# Patient Record
Sex: FEMALE | Race: WHITE | NOT HISPANIC OR LATINO | Employment: OTHER | URBAN - METROPOLITAN AREA
[De-identification: names, ages, dates, MRNs, and addresses within clinical notes are randomized per-mention and may not be internally consistent; named-entity substitution may affect disease eponyms.]

---

## 2017-01-03 DIAGNOSIS — I10 ESSENTIAL HYPERTENSION: Chronic | ICD-10-CM

## 2017-01-04 RX ORDER — HYDROCHLOROTHIAZIDE 25 MG/1
TABLET ORAL
Qty: 90 TABLET | Refills: 1 | Status: ON HOLD | OUTPATIENT
Start: 2017-01-04 | End: 2017-04-24 | Stop reason: HOSPADM

## 2017-01-23 ENCOUNTER — OFFICE VISIT (OUTPATIENT)
Dept: FAMILY MEDICINE | Facility: CLINIC | Age: 77
End: 2017-01-23
Payer: MEDICARE

## 2017-01-23 VITALS
SYSTOLIC BLOOD PRESSURE: 120 MMHG | OXYGEN SATURATION: 96 % | TEMPERATURE: 98 F | BODY MASS INDEX: 21.71 KG/M2 | DIASTOLIC BLOOD PRESSURE: 66 MMHG | HEIGHT: 63 IN | HEART RATE: 81 BPM | WEIGHT: 122.56 LBS

## 2017-01-23 DIAGNOSIS — I25.119 CORONARY ARTERY DISEASE INVOLVING NATIVE CORONARY ARTERY OF NATIVE HEART WITH ANGINA PECTORIS: Chronic | ICD-10-CM

## 2017-01-23 DIAGNOSIS — I73.9 PERIPHERAL ARTERIAL DISEASE: Chronic | ICD-10-CM

## 2017-01-23 DIAGNOSIS — E11.42 CONTROLLED TYPE 2 DIABETES MELLITUS WITH DIABETIC POLYNEUROPATHY, WITHOUT LONG-TERM CURRENT USE OF INSULIN: ICD-10-CM

## 2017-01-23 DIAGNOSIS — E05.90 HYPERTHYROIDISM: Chronic | ICD-10-CM

## 2017-01-23 DIAGNOSIS — M54.50 ACUTE BILATERAL LOW BACK PAIN WITHOUT SCIATICA: ICD-10-CM

## 2017-01-23 DIAGNOSIS — R60.0 BILATERAL LEG EDEMA: Primary | ICD-10-CM

## 2017-01-23 DIAGNOSIS — I10 ESSENTIAL HYPERTENSION: Chronic | ICD-10-CM

## 2017-01-23 DIAGNOSIS — I70.0 ATHEROSCLEROSIS OF AORTA: Chronic | ICD-10-CM

## 2017-01-23 PROCEDURE — 99999 PR PBB SHADOW E&M-EST. PATIENT-LVL III: CPT | Mod: PBBFAC,,, | Performed by: FAMILY MEDICINE

## 2017-01-23 PROCEDURE — 1160F RVW MEDS BY RX/DR IN RCRD: CPT | Mod: S$GLB,,, | Performed by: FAMILY MEDICINE

## 2017-01-23 PROCEDURE — 1159F MED LIST DOCD IN RCRD: CPT | Mod: S$GLB,,, | Performed by: FAMILY MEDICINE

## 2017-01-23 PROCEDURE — 1157F ADVNC CARE PLAN IN RCRD: CPT | Mod: S$GLB,,, | Performed by: FAMILY MEDICINE

## 2017-01-23 PROCEDURE — 99499 UNLISTED E&M SERVICE: CPT | Mod: S$GLB,,, | Performed by: FAMILY MEDICINE

## 2017-01-23 PROCEDURE — 3074F SYST BP LT 130 MM HG: CPT | Mod: S$GLB,,, | Performed by: FAMILY MEDICINE

## 2017-01-23 PROCEDURE — 99214 OFFICE O/P EST MOD 30 MIN: CPT | Mod: 25,S$GLB,, | Performed by: FAMILY MEDICINE

## 2017-01-23 PROCEDURE — 3078F DIAST BP <80 MM HG: CPT | Mod: S$GLB,,, | Performed by: FAMILY MEDICINE

## 2017-01-23 RX ORDER — GABAPENTIN 300 MG/1
300 CAPSULE ORAL NIGHTLY
Qty: 30 CAPSULE | Refills: 11 | Status: SHIPPED | OUTPATIENT
Start: 2017-01-23 | End: 2018-03-29 | Stop reason: SDUPTHER

## 2017-01-23 RX ORDER — TRAMADOL HYDROCHLORIDE 50 MG/1
50 TABLET ORAL EVERY 6 HOURS PRN
Qty: 30 TABLET | Refills: 0 | Status: SHIPPED | OUTPATIENT
Start: 2017-01-23 | End: 2017-04-20

## 2017-01-23 RX ORDER — GLIPIZIDE 10 MG/1
TABLET ORAL
Qty: 270 TABLET | Refills: 1 | Status: SHIPPED | OUTPATIENT
Start: 2017-01-23 | End: 2017-05-10

## 2017-01-23 NOTE — MR AVS SNAPSHOT
[facility]  [address] [facility]  [place] [address]  Phone: [phone]  Fax: [phone]                   Drew Kendrick   2017 2:30 PM   Office Visit    Description:  Female : 1940   Provider:  Aimee Marquis MD   Department:  [facility]           Reason for Visit     Edema           Diagnoses this Visit        Comments    Acute bilateral low back pain without sciatica                To Do List           Goals (5 Years of Data)              8/24/16    4/6/16    8/20/15    COMPLETED: HDL > 40   57    42    Related Problems    Combined hyperlipidemia associated with type 2 diabetes mellitus    HEMOGLOBIN A1C < 7.5   6.2  5.9  5.9    Related Problems    Diabetes mellitus type 2, controlled    COMPLETED: LDL CHOLESTEROL < 100   92.4    79.8    Related Problems    Combined hyperlipidemia associated with type 2 diabetes mellitus    Quit smoking / using tobacco     Not on track      Related Problems    Tobacco use    Notes - Note created  12/10/2013 11:44 AM by FELICITAS Thomas MD    Try switching to the electronic cigarette as a way to quit smoking.         These Medications        Disp Refills Start End    glipiZIDE (GLUCOTROL) 10 MG tablet 270 tablet 1 2017     TAKE TWO PILLS BY MOUTH EVERY MORNING AND ONE PILL BY MOUTH EVERY EVENING.    Pharmacy: [facility]/pharmacy #[address] [place], [place] [address] [place] Ph #: [phone]       tramadol (ULTRAM) 50 mg tablet 30 tablet 0 2017    Take 1 tablet (50 mg total) by mouth every 6 (six) hours as needed. - Oral    Pharmacy: [facility]/pharmacy #[facility] - [address] [facility] [place] [employer] Ph #: [phone]       gabapentin (NEURONTIN) 300 MG capsule 30 capsule 11 2017    Take 1 capsule (300 mg total) by mouth every evening. - Oral    Pharmacy: [facility]/pharmacy #[facility] - [facility]ly [facility] [place] #: [phone]         [facility]     [facility] Care [facility]  - 24/7 Assistance  Registered nurses in the [facility] provide clinical advisement, health education, appointment booking, and other advisory services.  Call for this free service at [phone].             Medications           Message regarding Medications     Verify the changes and/or additions to your medication regime listed below are the same as discussed with your clinician today.  If any of these changes or additions are incorrect, please notify your healthcare provider.        START taking these NEW medications        Refills    gabapentin (NEURONTIN) 300 MG capsule 11    Sig: Take 1 capsule (300 mg total) by mouth every evening.    Class: Normal    Route: Oral      STOP taking these medications     NICODERM CQ 14 mg/24 hr PLACE 1 PATCH ONTO THE SKIN ONCE DAILY.    nicotine polacrilex (NICORETTE) 4 MG Gum Take 1 each (4 mg total) by mouth as needed. Please dispense fruit chill flavor. Maximum 24 pieces/day.           Verify that the below list of medications is an accurate representation of the medications you are currently taking.  If none reported, the list may be blank. If incorrect, please contact your healthcare provider. Carry this list with you in case of emergency.           Current Medications     ACCU-CHEK SMARTVIEW TEST STRIP Strp     ALCOHOL PREP PADS PadM     aspirin 81 MG Chew Take 81 mg by mouth once daily.    cetirizine (ZYRTEC) 10 MG tablet TAKE 1 TABLET (10 MG TOTAL) BY MOUTH DAILY AS NEEDED FOR ALLERGIES OR RHINITIS.    cilostazol (PLETAL) 50 MG Tab Take 1 tablet (50 mg total) by mouth once daily.    diclofenac (VOLTAREN) 50 MG EC tablet Take 1 tablet (50 mg total) by mouth 3 (three) times daily as needed.    glipiZIDE (GLUCOTROL) 10 MG tablet TAKE TWO PILLS BY MOUTH EVERY MORNING AND ONE PILL BY MOUTH EVERY EVENING.    ibuprofen (ADVIL,MOTRIN) 200 MG tablet Take 2 tablets (400 mg total) by mouth every 6 (six) hours as needed for Pain.    lancets 30 gauge Misc     lancing  "device Misc     metformin (GLUCOPHAGE) 500 MG tablet Take 1 tablet (500 mg total) by mouth 2 (two) times daily with meals.    metoprolol tartrate (LOPRESSOR) 50 MG tablet Take 1 tablet (50 mg total) by mouth once daily.    nitroGLYCERIN (NITROSTAT) 0.4 MG SL tablet Place 1 tablet (0.4 mg total) under the tongue every 5 (five) minutes as needed for Chest pain. 1 Tablet, Sublingual Sublingual as needed .  as directed    ramipril (ALTACE) 5 MG capsule Take 1 capsule (5 mg total) by mouth once daily.    tramadol (ULTRAM) 50 mg tablet Take 1 tablet (50 mg total) by mouth every 6 (six) hours as needed.    fluticasone (FLONASE) 50 mcg/actuation nasal spray 1 spray by Each Nare route once daily.    gabapentin (NEURONTIN) 300 MG capsule Take 1 capsule (300 mg total) by mouth every evening.    hydrochlorothiazide (HYDRODIURIL) 25 MG tablet TAKE 1 TABLET (25 MG TOTAL) BY MOUTH ONCE DAILY.    hydrOXYzine (ATARAX) 25 MG tablet TAKE ONE OR TWO TABLETS EVERY 4-6 HOURS AS NEEDED FOR ITCHING. THIS MAY MAKE YOU MORE PRONE TO FALLING.    propylthiouracil (PTU) 50 mg Tab Take 2 tablets (100 mg total) by mouth every 8 (eight) hours.           Clinical Reference Information           Vital Signs - Last Recorded  Most recent update: 1/23/2017  2:33 PM by Rachael Moreno MA    BP Pulse Temp Ht Wt SpO2    120/66 (BP Location: Right arm, Patient Position: Sitting, BP Method: Manual) 81 97.7 °F (36.5 °C) (Oral) 5' 3" (1.6 m) 55.6 kg (122 lb 9.2 oz) 96%    BMI                21.71 kg/m2          Blood Pressure          Most Recent Value    BP  120/66      Allergies as of 1/23/2017     Pravastatin      Immunizations Administered on Date of Encounter - 1/23/2017     None      Smoking Cessation     If you would like to quit smoking:   You may be eligible for free services if you are a [place] resident and started smoking cigarettes before September 1, 1988.  Call the [employer]oking [employer]ess[employer] ([employer]) toll free at [phone] [phone] or (864) " 424-0938.   Call 3-386-QUIT-NOW if you do not meet the above criteria.

## 2017-01-24 NOTE — PROGRESS NOTES
Routine Office Visit    Patient Name: Khloe Kendrick    : 1940  MRN: [id number]    Subjective:  Khloe is a 76 y.o. female who presents today for     1. Establish care / new to me   2. Bilateral leg swelling, right greater than left. Pt states symptoms started 3 days ago. Swelling is worse after patient walks / stands for long periods of time. Pt states leg swelling is associated with pain. Pain is described as a burning pain in the bottom of her feet. She was taking tramadol for the pain.     Review of Systems   Constitutional: Negative for chills and fever.   HENT: Negative for congestion.    Eyes: Negative for blurred vision.   Respiratory: Negative for cough.    Cardiovascular: Positive for leg swelling. Negative for chest pain.   Gastrointestinal: Negative for abdominal pain, constipation, diarrhea, heartburn, nausea and vomiting.   Genitourinary: Negative for dysuria.   Musculoskeletal: Negative for myalgias.   Skin: Negative for itching and rash.   Neurological: Negative for dizziness and headaches.   Psychiatric/Behavioral: Negative for depression.       Active Problem List  Patient Active Problem List   Diagnosis    Diabetes mellitus type 2, controlled    Essential hypertension    Combined hyperlipidemia associated with type 2 diabetes mellitus    Osteopenia    Osteoarthritis    Tobacco use    Asymptomatic carotid artery stenosis without infarction    Peripheral arterial disease    Atherosclerosis of aorta    Angina pectoris    Coronary artery disease    Pseudophakia    Senile nuclear sclerosis    Hyperthyroidism    Weight loss    Allergic rhinitis    AMD (age-related macular degeneration), bilateral    Bilateral posterior capsular opacification    Vitreomacular traction syndrome of left eye       Past Surgical History  Past Surgical History   Procedure Laterality Date    Abdominal surgery      Hysterectomy      Hernia repair      Appendectomy      Eye surgery       Rt eye  "cataract    Cardiac surgery  2008     CABG    Hysterotomy N/A 33 yrs old    Cataract extraction Right     Cataract extraction w/  intraocular lens implant  8/13/2015     Dr. Blount ( [facility])       Family History  Family History   Problem Relation Age of Onset    Diabetes Mother     Coronary artery disease Father     Cataracts Father     Heart disease Brother     Cataracts Sister     No Known Problems Maternal Aunt     No Known Problems Maternal Uncle     No Known Problems Paternal Aunt     No Known Problems Paternal Uncle     No Known Problems Maternal Grandmother     No Known Problems Maternal Grandfather     No Known Problems Paternal Grandmother     No Known Problems Paternal Grandfather     Amblyopia Neg Hx     Blindness Neg Hx     Cancer Neg Hx     Glaucoma Neg Hx     Hypertension Neg Hx     Macular degeneration Neg Hx     Retinal detachment Neg Hx     Strabismus Neg Hx     Stroke Neg Hx     Thyroid disease Neg Hx        Social History  Social History     Social History    Marital status:      Spouse name: N/A    Number of children: N/A    Years of education: N/A     Occupational History    Not on file.     Social History Main Topics    Smoking status: Current Every Day Smoker     Packs/day: 1.00    Smokeless tobacco: Not on file    Alcohol use No    Drug use: No    Sexual activity: Not on file     Other Topics Concern    Not on file     Social History Narrative       Medications and Allergies  Reviewed and updated.       Physical Exam  Visit Vitals    /66 (BP Location: Right arm, Patient Position: Sitting, BP Method: Manual)    Pulse 81    Temp 97.7 °F (36.5 °C) (Oral)    Ht 5' 3" (1.6 m)    Wt 55.6 kg (122 lb 9.2 oz)    SpO2 96%    BMI 21.71 kg/m2     Physical Exam   Constitutional: She is oriented to person, place, and time. She appears well-developed and well-nourished.   HENT:   Head: Normocephalic and atraumatic.   Eyes: Conjunctivae and EOM are " normal. Pupils are equal, round, and reactive to light.   Neck: Normal range of motion. Neck supple.   Cardiovascular: Normal rate, regular rhythm and normal heart sounds.  Exam reveals no gallop and no friction rub.    No murmur heard.  Pulmonary/Chest: Breath sounds normal. No respiratory distress.   Abdominal: Soft. Bowel sounds are normal. She exhibits no distension. There is no tenderness.   Musculoskeletal: Normal range of motion.   Lymphadenopathy:     She has no cervical adenopathy.   Neurological: She is alert and oriented to person, place, and time.   Skin: Skin is warm.   Psychiatric: She has a normal mood and affect.         Assessment/Plan:  Khloe Kendrick is a 76 y.o. female who presents today for :    Bilateral leg edema  -     US Ankle Brachial Indices Ext LTD WO Str; Future; Expected date: 1/23/17  -     US Lower Extremity Veins Bilateral; Future; Expected date: 1/23/17    Acute bilateral low back pain without sciatica  -     tramadol (ULTRAM) 50 mg tablet; Take 1 tablet (50 mg total) by mouth every 6 (six) hours as needed.  Dispense: 30 tablet; Refill: 0  Will refill   Common side effects of this medication were discussed with the patient. Questions regarding medications were discussed during this visit.   Advise only take for severe pain     Controlled type 2 diabetes mellitus with diabetic polyneuropathy, without long-term current use of insulin  -     glipiZIDE (GLUCOTROL) 10 MG tablet; TAKE TWO PILLS BY MOUTH EVERY MORNING AND ONE PILL BY MOUTH EVERY EVENING.  Dispense: 270 tablet; Refill: 1  -     gabapentin (NEURONTIN) 300 MG capsule; Take 1 capsule (300 mg total) by mouth every evening.  Dispense: 30 capsule; Refill: 11  Neuropathy - will start on gabapentin for pain  Common side effects of this medication were discussed with the patient. Questions regarding medications were discussed during this visit.     Hyperthyroidism  The current medical regimen is effective;  continue present  plan and medications.    Atherosclerosis of aorta / Coronary artery disease involving native coronary artery of native heart with angina pectoris  Patient with Atherosclerosis of the Aorta.  Stable/asymptomatic. Currently stable on lipid lowering treatment and b/p monitoring.  Recommend to f/u with cardiology     Peripheral arterial disease  The current medical regimen is effective;  continue present plan and medications.    Essential hypertension  The current medical regimen is effective;  continue present plan and medications.            Return in about 3 months (around 4/23/2017), or if symptoms worsen or fail to improve.

## 2017-01-27 ENCOUNTER — HOSPITAL ENCOUNTER (OUTPATIENT)
Dept: RADIOLOGY | Facility: HOSPITAL | Age: 77
Discharge: HOME | End: 2017-01-27
Attending: FAMILY MEDICINE
Payer: MEDICARE

## 2017-01-27 ENCOUNTER — TELEPHONE (OUTPATIENT)
Dept: FAMILY MEDICINE | Facility: CLINIC | Age: 77
End: 2017-01-27

## 2017-01-27 DIAGNOSIS — R60.0 BILATERAL LEG EDEMA: ICD-10-CM

## 2017-01-27 PROCEDURE — 93970 EXTREMITY STUDY: CPT | Mod: 26,,, | Performed by: RADIOLOGY

## 2017-01-27 PROCEDURE — 93922 UPR/L XTREMITY ART 2 LEVELS: CPT | Mod: 26,,, | Performed by: RADIOLOGY

## 2017-01-27 PROCEDURE — 93970 EXTREMITY STUDY: CPT | Mod: TC

## 2017-01-27 PROCEDURE — 93922 UPR/L XTREMITY ART 2 LEVELS: CPT | Mod: TC

## 2017-01-29 DIAGNOSIS — E05.00 GRAVES' DISEASE: Primary | ICD-10-CM

## 2017-02-03 RX ORDER — PROPYLTHIOURACIL 50 MG/1
TABLET ORAL
Qty: 180 TABLET | Refills: 4 | Status: ON HOLD | OUTPATIENT
Start: 2017-02-03 | End: 2017-04-24 | Stop reason: HOSPADM

## 2017-02-03 NOTE — TELEPHONE ENCOUNTER
This patient has tried to get in touch with me regarding scheduling a dose of GALVIN for treatment of Graves'.     It looks like i ordered the therapy back in 8/2016. Not sure why it was not scheduled.     PLAN:    She needs lab tests to evaluate thyroid activity.   If the levels are close to normal then we can schedule the treatment dose of GALVIN for her overactive thyroid gland  she needs to stop the PTU for five days prior.      Lab Results   Component Value Date    TSH <0.010 (L) 11/01/2016

## 2017-03-17 DIAGNOSIS — E11.9 TYPE 2 DIABETES MELLITUS WITHOUT COMPLICATION: ICD-10-CM

## 2017-03-23 ENCOUNTER — OFFICE VISIT (OUTPATIENT)
Dept: FAMILY MEDICINE | Facility: CLINIC | Age: 77
End: 2017-03-23
Payer: MEDICARE

## 2017-03-23 VITALS
OXYGEN SATURATION: 99 % | HEIGHT: 63 IN | WEIGHT: 120.25 LBS | SYSTOLIC BLOOD PRESSURE: 180 MMHG | HEART RATE: 88 BPM | BODY MASS INDEX: 21.3 KG/M2 | DIASTOLIC BLOOD PRESSURE: 70 MMHG | TEMPERATURE: 98 F

## 2017-03-23 PROCEDURE — 1157F ADVNC CARE PLAN IN RCRD: CPT | Mod: S$GLB,,, | Performed by: NURSE PRACTITIONER

## 2017-03-23 PROCEDURE — 1125F AMNT PAIN NOTED PAIN PRSNT: CPT | Mod: S$GLB,,, | Performed by: NURSE PRACTITIONER

## 2017-03-23 PROCEDURE — 3078F DIAST BP <80 MM HG: CPT | Mod: S$GLB,,, | Performed by: NURSE PRACTITIONER

## 2017-03-23 PROCEDURE — 1160F RVW MEDS BY RX/DR IN RCRD: CPT | Mod: S$GLB,,, | Performed by: NURSE PRACTITIONER

## 2017-03-23 PROCEDURE — 99214 OFFICE O/P EST MOD 30 MIN: CPT | Mod: S$GLB,,, | Performed by: NURSE PRACTITIONER

## 2017-03-23 PROCEDURE — 1159F MED LIST DOCD IN RCRD: CPT | Mod: S$GLB,,, | Performed by: NURSE PRACTITIONER

## 2017-03-23 PROCEDURE — 99999 PR PBB SHADOW E&M-EST. PATIENT-LVL IV: CPT | Mod: PBBFAC,,, | Performed by: NURSE PRACTITIONER

## 2017-03-23 PROCEDURE — 3077F SYST BP >= 140 MM HG: CPT | Mod: S$GLB,,, | Performed by: NURSE PRACTITIONER

## 2017-03-23 RX ORDER — FLUTICASONE PROPIONATE 50 MCG
SPRAY, SUSPENSION (ML) NASAL
Refills: 5 | COMMUNITY
Start: 2016-12-31 | End: 2018-06-28

## 2017-03-23 RX ORDER — TRAMADOL HYDROCHLORIDE 50 MG/1
50 TABLET ORAL EVERY 6 HOURS PRN
Qty: 40 TABLET | Refills: 0 | Status: SHIPPED | OUTPATIENT
Start: 2017-03-23 | End: 2017-04-02

## 2017-03-23 RX ORDER — LIDOCAINE AND PRILOCAINE 25; 25 MG/G; MG/G
CREAM TOPICAL
Qty: 30 G | Refills: 0 | Status: SHIPPED | OUTPATIENT
Start: 2017-03-23 | End: 2017-05-05 | Stop reason: SDUPTHER

## 2017-03-23 RX ORDER — METHYLPREDNISOLONE 4 MG/1
TABLET ORAL
Qty: 1 PACKAGE | Refills: 0 | Status: ON HOLD | OUTPATIENT
Start: 2017-03-23 | End: 2017-04-24 | Stop reason: HOSPADM

## 2017-03-23 RX ORDER — AZITHROMYCIN 250 MG/1
TABLET, FILM COATED ORAL
Refills: 0 | Status: ON HOLD | COMMUNITY
Start: 2016-12-31 | End: 2017-04-24 | Stop reason: HOSPADM

## 2017-03-23 NOTE — PROGRESS NOTES
Subjective:       Patient ID: Khloe Kendrick is a 76 y.o. female.    Chief Complaint: Shoulder Pain    HPI Comments: 75 y/o female in office with compliant of neck and left shoulder pain for 2 weeks.Denies any recent injuries. Pain does not radiate.The pain in worst in morning. describes the pain as an stiff aching pain.  Denies any sweating, N/V, or chest pain. Has treated pain with Ibuprofen and heat with minimal relief.       Past Medical History:   Diagnosis Date    AMD (age-related macular degeneration), bilateral 6/10/2016    Cataract     Coronary artery disease     Diabetes mellitus, type 2     Hyperlipidemia     Hypertension     Hyperthyroidism 8/20/2015    Osteoarthritis     Peripheral arterial disease 4/3/2013       Social History     Social History    Marital status:      Spouse name: N/A    Number of children: N/A    Years of education: N/A     Occupational History    Not on file.     Social History Main Topics    Smoking status: Current Every Day Smoker     Packs/day: 1.00    Smokeless tobacco: Not on file    Alcohol use No    Drug use: No    Sexual activity: Not on file     Other Topics Concern    Not on file     Social History Narrative       Past Surgical History:   Procedure Laterality Date    ABDOMINAL SURGERY      APPENDECTOMY      CARDIAC SURGERY  2008    CABG    CATARACT EXTRACTION Right     CATARACT EXTRACTION W/  INTRAOCULAR LENS IMPLANT  8/13/2015    Dr. Blount ( [facility])    EYE SURGERY  2011    Rt eye cataract    HERNIA REPAIR      HYSTERECTOMY      HYSTEROTOMY N/A 33 yrs old       Review of Systems   Respiratory: Negative for chest tightness, shortness of breath and wheezing.    Cardiovascular: Negative for chest pain and palpitations.   Musculoskeletal: Positive for back pain, neck pain and neck stiffness. Negative for joint swelling.   Skin: Negative for color change, pallor and rash.   Neurological: Positive for headaches. Negative for dizziness,  "syncope, weakness, light-headedness and numbness.       Objective:   BP (!) 180/70  Pulse 88  Temp 98.1 °F (36.7 °C) (Oral)   Ht 5' 3" (1.6 m)  Wt 54.5 kg (120 lb 4.2 oz)  SpO2 99%  BMI 21.3 kg/m2     Physical Exam   Constitutional: She is oriented to person, place, and time. She appears well-developed and well-nourished.   HENT:   Head: Normocephalic and atraumatic.   Neck: Muscular tenderness present. Decreased range of motion present.   Cardiovascular: Normal rate, regular rhythm, S1 normal, S2 normal and normal heart sounds.    Pulmonary/Chest: Breath sounds normal. She has no decreased breath sounds.   Musculoskeletal:        Left shoulder: She exhibits decreased range of motion, tenderness, bony tenderness, swelling, pain and decreased strength. She exhibits no effusion, no laceration and normal pulse.        Cervical back: She exhibits decreased range of motion, tenderness, bony tenderness and pain. She exhibits no swelling, no edema and no deformity.   Neurological: She is alert and oriented to person, place, and time.   Skin: Skin is warm, dry and intact. She is not diaphoretic. No pallor.   Psychiatric: She has a normal mood and affect. Her speech is normal and behavior is normal. Thought content normal.       Assessment:       1. Bursitis/tendonitis, shoulder        Plan:       Khloe was seen today for shoulder pain.    Diagnoses and all orders for this visit:    Bursitis/tendonitis, shoulder  -     tramadol (ULTRAM) 50 mg tablet; Take 1 tablet (50 mg total) by mouth every 6 (six) hours as needed for Pain.  -     methylPREDNISolone (MEDROL DOSEPACK) 4 mg tablet; use as directed  -     lidocaine-prilocaine (EMLA) cream; Apply topically as needed. To left shoulder  Home care  · Rest the painful joint and protect it from movement. This will allow the inflammation to heal faster.  · Apply an ice pack over the injured area for no more than 15 to 20 minutes. Do this every 3 to 6 hours for the first 24 to " 48 hours. Keep using ice packs 3 to 4 times a day until the pain and swelling improves.   · To make an ice pack, put ice cubes in a sealed plastic zip-lock bag. Wrap the bag in a clean, thin towel or cloth. Never put ice or an ice pack directly on the skin. As the ice melts, be careful to avoid getting any wrap or splint wet.  · You may take over-the-counter pain medicine to treat pain and inflammation, unless another medicine was prescribed. Anti-inflammatory pain medicines may be more effective. Talk with your provider beforeusing these medicines if you have chronic liver or kidney disease, or ever had a stomach ulcer or GI (gastrointestinal) bleeding.  As your symptoms improve, slowly begin to move the joint. Do not overuse the joint. This may cause the symptoms to flare up again.    Return if symptoms worsen or fail to improve.

## 2017-03-23 NOTE — MR AVS SNAPSHOT
[facility]  [address] [facility]  [place] [address]  Phone: [phone]  Fax: [phone]                   Drew Kendrick   3/23/2017 2:00 PM   Office Visit    Description:  Female : 1940   Provider:  CHELO Alonso   Department:  [facility]           Diagnoses this Visit        Comments    Acute bilateral low back pain without sciatica                To Do List           Goals (5 Years of Data)              8/24/16    4/6/16    8/20/15    COMPLETED: HDL > 40   57    42    Related Problems    Combined hyperlipidemia associated with type 2 diabetes mellitus    HEMOGLOBIN A1C < 7.5   6.2  5.9  5.9    Related Problems    Diabetes mellitus type 2, controlled    COMPLETED: LDL CHOLESTEROL < 100   92.4    79.8    Related Problems    Combined hyperlipidemia associated with type 2 diabetes mellitus    Quit smoking / using tobacco     Not on track      Related Problems    Tobacco use    Notes - Note created  12/10/2013 11:44 AM by FELICITAS Thomas MD    Try switching to the electronic cigarette as a way to quit smoking.        Follow-Up and Disposition     Return if symptoms worsen or fail to improve.       These Medications        Disp Refills Start End    tramadol (ULTRAM) 50 mg tablet 40 tablet 0 3/23/2017 2017    Take 1 tablet (50 mg total) by mouth every 6 (six) hours as needed for Pain. - Oral    Pharmacy: [facility]/pharmacy #[facility] [employer] Ph #: [phone]       methylPREDNISolone (MEDROL DOSEPACK) 4 mg tablet 1 Package 0 3/23/2017     use as directed    Pharmacy: [facility]/pharmacy #[facility] [employer] Ph #: [phone]       lidocaine-prilocaine (EMLA) cream 30 g 0 3/23/2017     Apply topically as needed. To left shoulder - Topical (Top)    [facility] [place] [employer] Ph #: [phone]         [facility]     [facility] -   Assistance  Registered nurses in the [facility] provide clinical advisement, health education, appointment booking, and other advisory services.  Call for this free service at [phone].             Medications           Message regarding Medications     Verify the changes and/or additions to your medication regime listed below are the same as discussed with your clinician today.  If any of these changes or additions are incorrect, please notify your healthcare provider.        START taking these NEW medications        Refills    tramadol (ULTRAM) 50 mg tablet 0    Sig: Take 1 tablet (50 mg total) by mouth every 6 (six) hours as needed for Pain.    Class: Normal    Route: Oral    methylPREDNISolone (MEDROL DOSEPACK) 4 mg tablet 0    Sig: use as directed    Class: Normal    lidocaine-prilocaine (EMLA) cream 0    Sig: Apply topically as needed. To left shoulder    Class: Normal    Route: Topical (Top)           Verify that the below list of medications is an accurate representation of the medications you are currently taking.  If none reported, the list may be blank. If incorrect, please contact your healthcare provider. Carry this list with you in case of emergency.           Current Medications     ACCU-CHEK SMARTVIEW TEST STRIP Strp     ALCOHOL PREP PADS PadM     aspirin 81 MG Chew Take 81 mg by mouth once daily.    cetirizine (ZYRTEC) 10 MG tablet TAKE 1 TABLET (10 MG TOTAL) BY MOUTH DAILY AS NEEDED FOR ALLERGIES OR RHINITIS.    diclofenac (VOLTAREN) 50 MG EC tablet Take 1 tablet (50 mg total) by mouth 3 (three) times daily as needed.    gabapentin (NEURONTIN) 300 MG capsule Take 1 capsule (300 mg total) by mouth every evening.    glipiZIDE (GLUCOTROL) 10 MG tablet TAKE TWO PILLS BY MOUTH EVERY MORNING AND ONE PILL BY MOUTH EVERY EVENING.    hydrochlorothiazide (HYDRODIURIL) 25 MG tablet TAKE 1 TABLET (25 MG TOTAL) BY MOUTH ONCE DAILY.    hydrOXYzine (ATARAX) 25 MG tablet TAKE ONE OR TWO TABLETS  "EVERY 4-6 HOURS AS NEEDED FOR ITCHING. THIS MAY MAKE YOU MORE PRONE TO FALLING.    ibuprofen (ADVIL,MOTRIN) 200 MG tablet Take 2 tablets (400 mg total) by mouth every 6 (six) hours as needed for Pain.    lancets 30 gauge Misc     lancing device Misc     metoprolol tartrate (LOPRESSOR) 50 MG tablet Take 1 tablet (50 mg total) by mouth once daily.    nitroGLYCERIN (NITROSTAT) 0.4 MG SL tablet Place 1 tablet (0.4 mg total) under the tongue every 5 (five) minutes as needed for Chest pain. 1 Tablet, Sublingual Sublingual as needed .  as directed    propylthiouracil (PTU) 50 mg Tab TAKE 2 TABLETS BY MOUTH EVERY 8 HOURS    tramadol (ULTRAM) 50 mg tablet Take 1 tablet (50 mg total) by mouth every 6 (six) hours as needed.    azithromycin (Z-MARYCHUY) 250 MG tablet TAKE 2 TABLETS BY MOUTH TODAY, THEN TAKE 1 TABLET DAILY FOR 4 DAYS    fluticasone (FLONASE) 50 mcg/actuation nasal spray USE 1 SPRAY BY EACH NARE ROUTE ONCE DAILY.    lidocaine-prilocaine (EMLA) cream Apply topically as needed. To left shoulder    metformin (GLUCOPHAGE) 500 MG tablet Take 1 tablet (500 mg total) by mouth 2 (two) times daily with meals.    methylPREDNISolone (MEDROL DOSEPACK) 4 mg tablet use as directed    tramadol (ULTRAM) 50 mg tablet Take 1 tablet (50 mg total) by mouth every 6 (six) hours as needed for Pain.           Clinical Reference Information           Your Vitals Were     BP Pulse Temp Height Weight SpO2    180/70 88 98.1 °F (36.7 °C) (Oral) 5' 3" (1.6 m) 54.5 kg (120 lb 4.2 oz) 99%    BMI                21.3 kg/m2          Blood Pressure          Most Recent Value    BP  (!)  180/70      Allergies as of 3/23/2017     Pravastatin      Immunizations Administered on Date of Encounter - 3/23/2017     None      Instructions      Tendonitis  A tendon is the thick fibrous cord that joins muscle to bone and allows joints to move. When a tendon becomes inflamed, it is called tendonitis. This can occur from overuse, injury, or infection. This usually " involves the shoulders, forearm, wrist, hands and foot. Symptoms include pain, swelling and tenderness to the touch. Moving the joint increases the pain.  It takes 4 to 6 weeks for tendonitis to heal. It is treated by preventing motion of the tendon with a splint or brace and the use of anti-inflammatory medicine.  Home care  · Some people find relief with ice packs. These can be crushed or cubed ice in a plastic bag or a bag of frozen vegetables wrapped in a thin towel. Other people get better relief with heat. This can include a hot shower, hot bath, or a moist towel warmed in a microwave. Try each and use the method that feels best, for 15 to 20 minutes several times a day.  · Rest the inflamed joint and protect it from movement.  · You may use over-the-counter ibuprofen or naproxen to treat pain and inflammation, unless another medicine was prescribed. If you can't take these medicines, acetaminophen may help with the pain, but does not treat inflammation. If you have chronic liver or kidney disease or ever had a stomach ulcer or gastrointestinal bleeding, talk with your doctor before using these medicines.  · As your symptoms improve, begin gradual motion at the involved joint.  Follow-up care  Follow up with your healthcare provider if you are not improving after 5 days of treatment.  When to seek medical advice  Call your healthcare provider right away if any of these occur:  · Redness over the painful area  · Increasing pain or swelling at the joint  · Fever (1 degree above your normal temperature) lasting 24 to 48 hours Or, whatever your healthcare provider told you to report based on your condition  Date Last Reviewed: 11/21/2015  © 5172-5699 [employer]. [address] [place] [address], [place] [address]. All rights reserved. This information is not intended as a substitute for professional medical care. Always follow your healthcare professional's instructions.        Bursitis  You have bursitis.  This is an inflammation of the bursa. These are small, fluid-filled sacs that surround the larger joints of the body. The bursa help the muscles and tendons move smoothly over the joints.  Bursitis often happens in the shoulder. But it can also affect the elbows, hips, pelvis, knees, toes, and heels. Bursitis can be caused by injury, overuse of the joint, or infection of the bursa. Symptoms include pain and tenderness over a joint. Symptoms get worse with movement.  Bursitis is treated with an anti-inflammatory medicine and by resting the joint. More severe cases require injection of medicine directly into the bursa.    Home care  · Rest the painful joint and protect it from movement. This will allow the inflammation to heal faster.  · Apply an ice pack over the injured area for no more than 15 to 20 minutes. Do this every 3 to 6 hours for the first 24 to 48 hours. Keep using ice packs 3 to 4 times a day until the pain and swelling improves.   · To make an ice pack, put ice cubes in a sealed plastic zip-lock bag. Wrap the bag in a clean, thin towel or cloth. Never put ice or an ice pack directly on the skin. As the ice melts, be careful to avoid getting any wrap or splint wet.  · You may take over-the-counter pain medicine to treat pain and inflammation, unless another medicine was prescribed. Anti-inflammatory pain medicines may be more effective. Talk with your provider beforeusing these medicines if you have chronic liver or kidney disease, or ever had a stomach ulcer or GI (gastrointestinal) bleeding.  · As your symptoms improve, slowly begin to move the joint. Do not overuse the joint. This may cause the symptoms to flare up again.  When to seek medical advice  Call your healthcare provider right away if any of these occur:  · Redness over the painful area  · Increasing pain or swelling at the joint  · Fever of 100.4°F (38°C) or above lasting for 24 to 48 hours  Date Last Reviewed: 11/21/2015  © 7710-6161 [employer]. [address] [place] [address], [place] [address]. All rights reserved. This information is not intended as a substitute for professional medical care. Always follow your healthcare professional's instructions.             Smoking Cessation     If you would like to quit smoking:   You may be eligible for free services if you are a [place] resident and started smoking cigarettes before September 1, 1988.  Call the [employer]oking [employer]essation [employer] (SCT) toll free at (027) 785-6860 or (340) 191-3203.   Call 1-449-QUIT-NOW if you do not meet the above criteria.            Language Assistance Services     ATTENTION: Language assistance services are available, free of charge. Please call [phone].      ATENCIÓN: Si habla galilea, tiene a grayson disposición servicios gratuitos de asistencia lingüística. Llame al [phone].     CHÚ Ý: N?u b?n nói Ti?ng Vi?t, có các d?ch v? h? tr? ngôn ng? mi?n phí dành cho b?n. G?i s? [phone].         [facility] complies with applicable Federal civil rights laws and does not discriminate on the basis of race, color, national origin, age, disability, or sex.

## 2017-03-23 NOTE — PATIENT INSTRUCTIONS
Tendonitis  A tendon is the thick fibrous cord that joins muscle to bone and allows joints to move. When a tendon becomes inflamed, it is called tendonitis. This can occur from overuse, injury, or infection. This usually involves the shoulders, forearm, wrist, hands and foot. Symptoms include pain, swelling and tenderness to the touch. Moving the joint increases the pain.  It takes 4 to 6 weeks for tendonitis to heal. It is treated by preventing motion of the tendon with a splint or brace and the use of anti-inflammatory medicine.  Home care  · Some people find relief with ice packs. These can be crushed or cubed ice in a plastic bag or a bag of frozen vegetables wrapped in a thin towel. Other people get better relief with heat. This can include a hot shower, hot bath, or a moist towel warmed in a microwave. Try each and use the method that feels best, for 15 to 20 minutes several times a day.  · Rest the inflamed joint and protect it from movement.  · You may use over-the-counter ibuprofen or naproxen to treat pain and inflammation, unless another medicine was prescribed. If you can't take these medicines, acetaminophen may help with the pain, but does not treat inflammation. If you have chronic liver or kidney disease or ever had a stomach ulcer or gastrointestinal bleeding, talk with your doctor before using these medicines.  · As your symptoms improve, begin gradual motion at the involved joint.  Follow-up care  Follow up with your healthcare provider if you are not improving after 5 days of treatment.  When to seek medical advice  Call your healthcare provider right away if any of these occur:  · Redness over the painful area  · Increasing pain or swelling at the joint  · Fever (1 degree above your normal temperature) lasting 24 to 48 hours Or, whatever your healthcare provider told you to report based on your condition  Date Last Reviewed: 11/21/2015  © 2114-8425 [employer]. [address] [place] [address]  Road, [place] [address]. All rights reserved. This information is not intended as a substitute for professional medical care. Always follow your healthcare professional's instructions.        Bursitis  You have bursitis. This is an inflammation of the bursa. These are small, fluid-filled sacs that surround the larger joints of the body. The bursa help the muscles and tendons move smoothly over the joints.  Bursitis often happens in the shoulder. But it can also affect the elbows, hips, pelvis, knees, toes, and heels. Bursitis can be caused by injury, overuse of the joint, or infection of the bursa. Symptoms include pain and tenderness over a joint. Symptoms get worse with movement.  Bursitis is treated with an anti-inflammatory medicine and by resting the joint. More severe cases require injection of medicine directly into the bursa.    Home care  · Rest the painful joint and protect it from movement. This will allow the inflammation to heal faster.  · Apply an ice pack over the injured area for no more than 15 to 20 minutes. Do this every 3 to 6 hours for the first 24 to 48 hours. Keep using ice packs 3 to 4 times a day until the pain and swelling improves.   · To make an ice pack, put ice cubes in a sealed plastic zip-lock bag. Wrap the bag in a clean, thin towel or cloth. Never put ice or an ice pack directly on the skin. As the ice melts, be careful to avoid getting any wrap or splint wet.  · You may take over-the-counter pain medicine to treat pain and inflammation, unless another medicine was prescribed. Anti-inflammatory pain medicines may be more effective. Talk with your provider beforeusing these medicines if you have chronic liver or kidney disease, or ever had a stomach ulcer or GI (gastrointestinal) bleeding.  · As your symptoms improve, slowly begin to move the joint. Do not overuse the joint. This may cause the symptoms to flare up again.  When to seek medical advice  Call your healthcare provider  right away if any of these occur:  · Redness over the painful area  · Increasing pain or swelling at the joint  · Fever of 100.4°F (38°C) or above lasting for 24 to 48 hours  Date Last Reviewed: 11/21/2015  © 3923-7998 [employer]. [address] [place] [address]. All rights reserved. This information is not intended as a substitute for professional medical care. Always follow your healthcare professional's instructions.

## 2017-04-10 DIAGNOSIS — I25.119 CORONARY ARTERY DISEASE INVOLVING NATIVE CORONARY ARTERY OF NATIVE HEART WITH ANGINA PECTORIS: Chronic | ICD-10-CM

## 2017-04-10 DIAGNOSIS — I70.0 ATHEROSCLEROSIS OF AORTA: Chronic | ICD-10-CM

## 2017-04-10 DIAGNOSIS — I10 ESSENTIAL HYPERTENSION: Chronic | ICD-10-CM

## 2017-04-10 DIAGNOSIS — I73.9 PERIPHERAL ARTERIAL DISEASE: Chronic | ICD-10-CM

## 2017-04-10 RX ORDER — CILOSTAZOL 50 MG/1
50 TABLET ORAL DAILY
Qty: 90 TABLET | Refills: 1 | Status: SHIPPED | OUTPATIENT
Start: 2017-04-10 | End: 2017-10-10 | Stop reason: SDUPTHER

## 2017-04-10 RX ORDER — METOPROLOL TARTRATE 50 MG/1
50 TABLET ORAL DAILY
Qty: 90 TABLET | Refills: 1 | Status: SHIPPED | OUTPATIENT
Start: 2017-04-10 | End: 2017-10-10 | Stop reason: SDUPTHER

## 2017-04-10 RX ORDER — DEXAMETHASONE 0.75 MG/1
0.75 TABLET ORAL 2 TIMES DAILY
Qty: 60 TABLET | Refills: 0 | Status: ON HOLD | OUTPATIENT
Start: 2017-04-10 | End: 2017-04-24 | Stop reason: HOSPADM

## 2017-04-10 RX ORDER — RAMIPRIL 5 MG/1
5 CAPSULE ORAL DAILY
Qty: 90 CAPSULE | Refills: 1 | Status: SHIPPED | OUTPATIENT
Start: 2017-04-10 | End: 2017-05-11 | Stop reason: ALTCHOICE

## 2017-04-10 NOTE — TELEPHONE ENCOUNTER
----- Message from Keely Morales sent at 4/10/2017  1:40 PM CDT -----  Contact: [phone]  Pt is requesting refills on metoprolol tartrate (LOPRESSOR) 50 MG tablet ,dexamethasone (DECADRON) 0.75 MG Tab ,cilostazol (PLETAL) 50 MG Tab and ramipril (ALTACE) 5 MG capsule Please call pt at your earliest convenience.  Thanks !

## 2017-04-20 ENCOUNTER — HOSPITAL ENCOUNTER (INPATIENT)
Facility: HOSPITAL | Age: 77
LOS: 4 days | Discharge: HOME HEALTH | DRG: 854 | End: 2017-04-24
Attending: EMERGENCY MEDICINE | Admitting: EMERGENCY MEDICINE
Payer: MEDICARE

## 2017-04-20 DIAGNOSIS — M85.80 OSTEOPENIA, UNSPECIFIED LOCATION: ICD-10-CM

## 2017-04-20 DIAGNOSIS — E78.2 COMBINED HYPERLIPIDEMIA ASSOCIATED WITH TYPE 2 DIABETES MELLITUS: Chronic | ICD-10-CM

## 2017-04-20 DIAGNOSIS — R52 PAIN: ICD-10-CM

## 2017-04-20 DIAGNOSIS — I10 ESSENTIAL HYPERTENSION: Chronic | ICD-10-CM

## 2017-04-20 DIAGNOSIS — A41.9 SEPSIS, DUE TO UNSPECIFIED ORGANISM: ICD-10-CM

## 2017-04-20 DIAGNOSIS — I65.29 ASYMPTOMATIC CAROTID ARTERY STENOSIS WITHOUT INFARCTION, UNSPECIFIED LATERALITY: ICD-10-CM

## 2017-04-20 DIAGNOSIS — Z72.0 TOBACCO USE: ICD-10-CM

## 2017-04-20 DIAGNOSIS — E05.90 HYPERTHYROIDISM: Chronic | ICD-10-CM

## 2017-04-20 DIAGNOSIS — M00.9 SEPTIC JOINT: Primary | ICD-10-CM

## 2017-04-20 DIAGNOSIS — R63.4 WEIGHT LOSS: ICD-10-CM

## 2017-04-20 DIAGNOSIS — H35.30 AMD (AGE-RELATED MACULAR DEGENERATION), BILATERAL: ICD-10-CM

## 2017-04-20 DIAGNOSIS — I73.9 PERIPHERAL ARTERIAL DISEASE: Chronic | ICD-10-CM

## 2017-04-20 DIAGNOSIS — I70.0 ATHEROSCLEROSIS OF AORTA: Chronic | ICD-10-CM

## 2017-04-20 DIAGNOSIS — M19.90 OSTEOARTHRITIS, UNSPECIFIED OSTEOARTHRITIS TYPE, UNSPECIFIED SITE: ICD-10-CM

## 2017-04-20 DIAGNOSIS — Z96.1 PSEUDOPHAKIA: ICD-10-CM

## 2017-04-20 DIAGNOSIS — I25.119 CORONARY ARTERY DISEASE INVOLVING NATIVE CORONARY ARTERY OF NATIVE HEART WITH ANGINA PECTORIS: Chronic | ICD-10-CM

## 2017-04-20 DIAGNOSIS — E11.42 CONTROLLED TYPE 2 DIABETES MELLITUS WITH DIABETIC POLYNEUROPATHY, WITHOUT LONG-TERM CURRENT USE OF INSULIN: Chronic | ICD-10-CM

## 2017-04-20 DIAGNOSIS — E11.69 COMBINED HYPERLIPIDEMIA ASSOCIATED WITH TYPE 2 DIABETES MELLITUS: Chronic | ICD-10-CM

## 2017-04-20 DIAGNOSIS — H25.10 SENILE NUCLEAR SCLEROSIS, UNSPECIFIED LATERALITY: ICD-10-CM

## 2017-04-20 DIAGNOSIS — H43.822 VITREOMACULAR TRACTION SYNDROME OF LEFT EYE: ICD-10-CM

## 2017-04-20 DIAGNOSIS — H26.493 BILATERAL POSTERIOR CAPSULAR OPACIFICATION: ICD-10-CM

## 2017-04-20 DIAGNOSIS — I20.9 ANGINA PECTORIS: Chronic | ICD-10-CM

## 2017-04-20 LAB
ALBUMIN SERPL BCP-MCNC: 3.4 G/DL
ALP SERPL-CCNC: 115 U/L
ALT SERPL W/O P-5'-P-CCNC: 8 U/L
ANION GAP SERPL CALC-SCNC: 11 MMOL/L
APPEARANCE FLD: NORMAL
AST SERPL-CCNC: 23 U/L
BACTERIA #/AREA URNS HPF: NORMAL /HPF
BASOPHILS # BLD AUTO: 0.04 K/UL
BASOPHILS NFR BLD: 0.2 %
BILIRUB SERPL-MCNC: 0.8 MG/DL
BILIRUB UR QL STRIP: NEGATIVE
BODY FLD TYPE: NORMAL
BUN SERPL-MCNC: 17 MG/DL
CALCIUM SERPL-MCNC: 10.1 MG/DL
CHLORIDE SERPL-SCNC: 102 MMOL/L
CLARITY UR: CLEAR
CO2 SERPL-SCNC: 26 MMOL/L
COLOR FLD: YELLOW
COLOR UR: YELLOW
CREAT SERPL-MCNC: 0.8 MG/DL
CRP SERPL-MCNC: 61 MG/L
DIFFERENTIAL METHOD: ABNORMAL
EOSINOPHIL # BLD AUTO: 0 K/UL
EOSINOPHIL NFR BLD: 0.1 %
ERYTHROCYTE [DISTWIDTH] IN BLOOD BY AUTOMATED COUNT: 14.5 %
ERYTHROCYTE [SEDIMENTATION RATE] IN BLOOD BY WESTERGREN METHOD: 61 MM/HR
EST. GFR  (AFRICAN AMERICAN): >60 ML/MIN/1.73 M^2
EST. GFR  (NON AFRICAN AMERICAN): >60 ML/MIN/1.73 M^2
GLUCOSE SERPL-MCNC: 104 MG/DL
GLUCOSE UR QL STRIP: NEGATIVE
HCT VFR BLD AUTO: 38.7 %
HGB BLD-MCNC: 12.7 G/DL
HGB UR QL STRIP: ABNORMAL
INR PPP: 1
KETONES UR QL STRIP: NEGATIVE
LEUKOCYTE ESTERASE UR QL STRIP: NEGATIVE
LYMPHOCYTES # BLD AUTO: 2.5 K/UL
LYMPHOCYTES NFR BLD: 14.5 %
LYMPHOCYTES NFR FLD MANUAL: 3 %
MCH RBC QN AUTO: 26.9 PG
MCHC RBC AUTO-ENTMCNC: 32.8 %
MCV RBC AUTO: 82 FL
MICROSCOPIC COMMENT: NORMAL
MONOCYTES # BLD AUTO: 1.5 K/UL
MONOCYTES NFR BLD: 8.6 %
NEUTROPHILS # BLD AUTO: 13 K/UL
NEUTROPHILS NFR BLD: 76.4 %
NEUTROPHILS NFR FLD MANUAL: 97 %
NITRITE UR QL STRIP: NEGATIVE
PH UR STRIP: 5 [PH] (ref 5–8)
PLATELET # BLD AUTO: 286 K/UL
PMV BLD AUTO: 10.8 FL
POTASSIUM SERPL-SCNC: 4.4 MMOL/L
PROT SERPL-MCNC: 7.7 G/DL
PROT UR QL STRIP: NEGATIVE
PROTHROMBIN TIME: 10.7 SEC
RBC # BLD AUTO: 4.72 M/UL
RBC #/AREA URNS HPF: 4 /HPF (ref 0–4)
SODIUM SERPL-SCNC: 139 MMOL/L
SP GR UR STRIP: 1.01 (ref 1–1.03)
SQUAMOUS #/AREA URNS HPF: 4 /HPF
URN SPEC COLLECT METH UR: ABNORMAL
UROBILINOGEN UR STRIP-ACNC: NEGATIVE EU/DL
WBC # BLD AUTO: 16.94 K/UL
WBC # FLD: NORMAL /CU MM

## 2017-04-20 PROCEDURE — 80053 COMPREHEN METABOLIC PANEL: CPT

## 2017-04-20 PROCEDURE — 20610 DRAIN/INJ JOINT/BURSA W/O US: CPT | Mod: RT

## 2017-04-20 PROCEDURE — 25000003 PHARM REV CODE 250: Performed by: NURSE PRACTITIONER

## 2017-04-20 PROCEDURE — 85025 COMPLETE CBC W/AUTO DIFF WBC: CPT

## 2017-04-20 PROCEDURE — 87205 SMEAR GRAM STAIN: CPT | Mod: 91

## 2017-04-20 PROCEDURE — 81000 URINALYSIS NONAUTO W/SCOPE: CPT

## 2017-04-20 PROCEDURE — 99285 EMERGENCY DEPT VISIT HI MDM: CPT | Mod: 25

## 2017-04-20 PROCEDURE — 96366 THER/PROPH/DIAG IV INF ADDON: CPT

## 2017-04-20 PROCEDURE — 96376 TX/PRO/DX INJ SAME DRUG ADON: CPT

## 2017-04-20 PROCEDURE — 89060 EXAM SYNOVIAL FLUID CRYSTALS: CPT

## 2017-04-20 PROCEDURE — 96367 TX/PROPH/DG ADDL SEQ IV INF: CPT

## 2017-04-20 PROCEDURE — 96375 TX/PRO/DX INJ NEW DRUG ADDON: CPT

## 2017-04-20 PROCEDURE — 85651 RBC SED RATE NONAUTOMATED: CPT

## 2017-04-20 PROCEDURE — 89051 BODY FLUID CELL COUNT: CPT

## 2017-04-20 PROCEDURE — 86140 C-REACTIVE PROTEIN: CPT

## 2017-04-20 PROCEDURE — 87040 BLOOD CULTURE FOR BACTERIA: CPT

## 2017-04-20 PROCEDURE — 63600175 PHARM REV CODE 636 W HCPCS: Performed by: NURSE PRACTITIONER

## 2017-04-20 PROCEDURE — 12000002 HC ACUTE/MED SURGE SEMI-PRIVATE ROOM

## 2017-04-20 PROCEDURE — 96361 HYDRATE IV INFUSION ADD-ON: CPT

## 2017-04-20 PROCEDURE — 63600175 PHARM REV CODE 636 W HCPCS: Performed by: EMERGENCY MEDICINE

## 2017-04-20 PROCEDURE — 87070 CULTURE OTHR SPECIMN AEROBIC: CPT

## 2017-04-20 PROCEDURE — 85610 PROTHROMBIN TIME: CPT

## 2017-04-20 PROCEDURE — 96365 THER/PROPH/DIAG IV INF INIT: CPT

## 2017-04-20 RX ORDER — HYDROMORPHONE HYDROCHLORIDE 2 MG/ML
1 INJECTION, SOLUTION INTRAMUSCULAR; INTRAVENOUS; SUBCUTANEOUS
Status: COMPLETED | OUTPATIENT
Start: 2017-04-20 | End: 2017-04-20

## 2017-04-20 RX ORDER — SODIUM CHLORIDE 9 MG/ML
1000 INJECTION, SOLUTION INTRAVENOUS
Status: COMPLETED | OUTPATIENT
Start: 2017-04-20 | End: 2017-04-20

## 2017-04-20 RX ORDER — HYDROMORPHONE HYDROCHLORIDE 2 MG/ML
0.5 INJECTION, SOLUTION INTRAMUSCULAR; INTRAVENOUS; SUBCUTANEOUS
Status: COMPLETED | OUTPATIENT
Start: 2017-04-20 | End: 2017-04-20

## 2017-04-20 RX ORDER — ONDANSETRON 2 MG/ML
4 INJECTION INTRAMUSCULAR; INTRAVENOUS
Status: COMPLETED | OUTPATIENT
Start: 2017-04-20 | End: 2017-04-20

## 2017-04-20 RX ORDER — LIDOCAINE HYDROCHLORIDE 10 MG/ML
2 INJECTION, SOLUTION EPIDURAL; INFILTRATION; INTRACAUDAL; PERINEURAL
Status: COMPLETED | OUTPATIENT
Start: 2017-04-20 | End: 2017-04-20

## 2017-04-20 RX ADMIN — SODIUM CHLORIDE 1000 ML: 0.9 INJECTION, SOLUTION INTRAVENOUS at 07:04

## 2017-04-20 RX ADMIN — HYDROMORPHONE HYDROCHLORIDE 1 MG: 2 INJECTION INTRAMUSCULAR; INTRAVENOUS; SUBCUTANEOUS at 05:04

## 2017-04-20 RX ADMIN — HYDROMORPHONE HYDROCHLORIDE 0.5 MG: 2 INJECTION INTRAMUSCULAR; INTRAVENOUS; SUBCUTANEOUS at 10:04

## 2017-04-20 RX ADMIN — CEFTRIAXONE 2 G: 2 INJECTION, SOLUTION INTRAVENOUS at 10:04

## 2017-04-20 RX ADMIN — ONDANSETRON 4 MG: 2 INJECTION INTRAMUSCULAR; INTRAVENOUS at 05:04

## 2017-04-20 RX ADMIN — SODIUM CHLORIDE 1000 ML: 0.9 INJECTION, SOLUTION INTRAVENOUS at 05:04

## 2017-04-20 RX ADMIN — LIDOCAINE HYDROCHLORIDE 20 MG: 10 INJECTION, SOLUTION EPIDURAL; INFILTRATION; INTRACAUDAL; PERINEURAL at 06:04

## 2017-04-20 RX ADMIN — HYDROMORPHONE HYDROCHLORIDE 0.5 MG: 2 INJECTION INTRAMUSCULAR; INTRAVENOUS; SUBCUTANEOUS at 07:04

## 2017-04-20 RX ADMIN — VANCOMYCIN HYDROCHLORIDE 1000 MG: 1 INJECTION, POWDER, LYOPHILIZED, FOR SOLUTION INTRAVENOUS at 11:04

## 2017-04-20 NOTE — ED PROVIDER NOTES
"Encounter Date: 4/20/2017    SCRIBE #1 NOTE: I, GenaPatrizia Hermann, am scribing for, and in the presence of,  Adelia Mott NP. I have scribed the following portions of the note - Other sections scribed: HPI, ROS.       History     Chief Complaint   Patient presents with    Knee Pain     States she has knee pain since she woke up this morning     Review of patient's allergies indicates:   Allergen Reactions    Pravastatin Other (See Comments)     Muscle pain     HPI Comments: CC: Knee Pain    75 y/o female with AMD, CAD, DM type II, HTN, hyperthyroidism, osteoarthritis, and peripheral arterial disease presents to the ED c/o acute onset atraumatic R sided knee pain that radiates down back of R sided calf that started this morning. Pain is severe (10/10). Movement and palpation exacerbates the pain. Pt reports similar symptoms last yr on her L leg; however, pt states that her doctor wasn't able to figure out the problem. Pt reports sleeping with her legs "twisted." Pt also reports itchiness to her bilateral arms and legs. Pt denies hx of blood clots. Pt denies fever, chills, or cough. No attempted treatment reported. No alleviating factors or other symptoms reported.    The history is provided by the patient. No  was used.     Past Medical History:   Diagnosis Date    AMD (age-related macular degeneration), bilateral 6/10/2016    Cataract     Coronary artery disease     Diabetes mellitus, type 2     Hyperlipidemia     Hypertension     Hyperthyroidism 8/20/2015    Osteoarthritis     Peripheral arterial disease 4/3/2013     Past Surgical History:   Procedure Laterality Date    ABDOMINAL SURGERY      APPENDECTOMY      CARDIAC SURGERY  2008    CABG    CATARACT EXTRACTION Right     CATARACT EXTRACTION W/  INTRAOCULAR LENS IMPLANT  8/13/2015    Dr. Blount ( [facility])    EYE SURGERY  2011    Rt eye cataract    HERNIA REPAIR      HYSTERECTOMY      HYSTEROTOMY N/A 33 yrs old     Family " History   Problem Relation Age of Onset    Diabetes Mother     Coronary artery disease Father     Cataracts Father     Heart disease Brother     Cataracts Sister     No Known Problems Maternal Aunt     No Known Problems Maternal Uncle     No Known Problems Paternal Aunt     No Known Problems Paternal Uncle     No Known Problems Maternal Grandmother     No Known Problems Maternal Grandfather     No Known Problems Paternal Grandmother     No Known Problems Paternal Grandfather     Amblyopia Neg Hx     Blindness Neg Hx     Cancer Neg Hx     Glaucoma Neg Hx     Hypertension Neg Hx     Macular degeneration Neg Hx     Retinal detachment Neg Hx     Strabismus Neg Hx     Stroke Neg Hx     Thyroid disease Neg Hx      Social History   Substance Use Topics    Smoking status: Current Every Day Smoker     Packs/day: 1.00    Smokeless tobacco: None    Alcohol use No     Review of Systems   Constitutional: Negative for chills and fever.   HENT: Negative for rhinorrhea.    Eyes: Negative for redness.   Respiratory: Negative for cough and shortness of breath.    Cardiovascular: Negative for chest pain.   Gastrointestinal: Negative for abdominal pain, diarrhea, nausea and vomiting.   Genitourinary: Negative for difficulty urinating and dysuria.   Musculoskeletal:        (+) R sided knee pain that radiates to the back of R sided calf   Skin: Negative for rash.        (+) itchiness to bilateral arms and legs   Neurological: Negative for headaches.       Physical Exam   Initial Vitals   BP Pulse Resp Temp SpO2   04/20/17 1504 04/20/17 1504 04/20/17 1504 04/20/17 1504 04/20/17 1504   139/65 110 18 99.4 °F (37.4 °C) 98 %     Physical Exam    Nursing note and vitals reviewed.  Constitutional: She appears well-developed and well-nourished.   HENT:   Head: Normocephalic.   Mouth/Throat: Oropharynx is clear and moist.   Eyes: EOM are normal.   Neck: Normal range of motion.   Cardiovascular: Regular rhythm, normal  "heart sounds and normal pulses. Tachycardia present.  Exam reveals no friction rub.    No murmur heard.  Pulmonary/Chest: Breath sounds normal. No respiratory distress. She has no wheezes. She has no rales.   Abdominal: Soft. Bowel sounds are normal. She exhibits no distension.   Musculoskeletal: She exhibits tenderness.        Right knee: She exhibits decreased range of motion and swelling. Tenderness found.        Legs:  Neurological: She is alert and oriented to person, place, and time.   Skin: Skin is warm and dry.   Psychiatric: She has a normal mood and affect.         ED Course   Arthrocentesis  Date/Time: 2017 8:00 PM  Location procedure was performed: [facility] EMERGENCY DEPARTMENT  Performed by: RENETTA BURNS  Authorized by: ALEXANDRA ADAMS   Pre-op diagnosis: septic joint  Post-op diagnosis: septic joint  Consent Done: Yes  Consent: Verbal consent obtained. Written consent not obtained.  Risks and benefits: risks, benefits and alternatives were discussed  Consent given by: patient  Patient understanding: patient states understanding of the procedure being performed  Patient consent: the patient's understanding of the procedure matches consent given  Site marked: the operative site was marked  Imaging studies: imaging studies available  Required items: required blood products, implants, devices, and special equipment available  Patient identity confirmed:  and MRN  Time out: Immediately prior to procedure a "time out" was called to verify the correct patient, procedure, equipment, support staff and site/side marked as required.  Indications: joint swelling, pain, possible septic joint and diagnostic evaluation   Body area: knee  Joint: right knee  Local anesthesia used: yes  Anesthesia: local infiltration    Anesthesia:  Local anesthesia used: yes  Anesthesia: local infiltration  Local Anesthetic: lidocaine 1% without epinephrine   Anesthetic total: 3 mL  Patient sedated: no  Preparation: Patient was " prepped and draped in the usual sterile fashion.  Needle size: 18 G  Approach: anterior  Aspirate amount: 10 mL  Aspirate: cloudy  Patient tolerance: Patient tolerated the procedure well with no immediate complications  Complications: No  Estimated blood loss (mL): 0  Specimens: No  Implants: No        Labs Reviewed   CBC W/ AUTO DIFFERENTIAL - Abnormal; Notable for the following:        Result Value    WBC 16.94 (*)     MCH 26.9 (*)     Gran # 13.0 (*)     Mono # 1.5 (*)     Gran% 76.4 (*)     Lymph% 14.5 (*)     All other components within normal limits   COMPREHENSIVE METABOLIC PANEL - Abnormal; Notable for the following:     Albumin 3.4 (*)     ALT 8 (*)     All other components within normal limits   C-REACTIVE PROTEIN - Abnormal; Notable for the following:     CRP 61.0 (*)     All other components within normal limits   SEDIMENTATION RATE, MANUAL - Abnormal; Notable for the following:     Sed Rate 61 (*)     All other components within normal limits   URINALYSIS - Abnormal; Notable for the following:     Occult Blood UA 1+ (*)     All other components within normal limits   CULTURE, FLUID  (AEROBIC) WITH GRAM STAIN   GRAM STAIN   CULTURE, BLOOD   CULTURE, BLOOD   PROTIME-INR    Narrative:     Recoll. [id number] by LM1 at 04/20/2017 17:33, reason:   QNS/DISCARDED/JENNY   WBC & DIFF,BODY FLUID   URINALYSIS MICROSCOPIC   BODY FLUID CRYSTAL     EKG Readings: (Independently Interpreted)   Initial Reading: No STEMI. Rhythm: Normal Sinus Rhythm. Heart Rate: 96. Conduction: RBBB.       X-Rays:   Independently Interpreted Readings:   Other Readings:  XR right knee: no acute fracture.     Medical Decision Making:   History:   Old Medical Records: I decided to obtain old medical records.  Initial Assessment:   This is an emergent evaluation of a 76-year-old woman who presented to the emergency department tonight secondary to acute right knee pain.  Differential Diagnosis:   Septic joint, ruptured Baker's cyst, DVT,  osteoarthritis  Independently Interpreted Test(s):   I have ordered and independently interpreted X-rays - see prior notes.  I have ordered and independently interpreted EKG Reading(s) - see prior notes  Clinical Tests:   Lab Tests: Ordered and Reviewed  The following lab test(s) were unremarkable: CBC, CMP, Urinalysis, PT and PTT  Radiological Study: Ordered and Reviewed  Medical Tests: Ordered and Reviewed  ED Management:  On physical examination, patient appears uncomfortable.  Her lung sounds are clear to auscultation bilaterally and her heart sounds are tachycardic.  Her abdomen is soft, nontender, nondistended.  Examination of the lower extremities reveals a swollen right knee with decreased range of motion secondary to pain.  She is unable to flex the knee and unable to tolerate range of motion with either passive or active movement.  Labs were obtained and showed a leukocytosis with a white blood cell count of 16.94.  There is a granulocyte predominance of 76.4%.  There are no bands.  CMP is unremarkable.  CRP and ESR are both elevated at 61.  Joint aspiration was performed and revealed an elevated white blood cell count of 46,125 with 97% segs.  Gram stain and body fluid crystal are still pending.    X-ray of the knee revealed: 1. No displaced fracture. Unchanged small suprapatellar synovial complex/effusion. 2. 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     Ultrasound of the right lower extremity revealed: 1. No evidence of DVT in the right lower extremity. 2. Right popliteal fossa 3.1 x 1.3 x 4.3 cm probable Baker's cyst.    Patient was empirically started on vancomycin and Rocephin for possible septic knee joint.  She was treated for her pain with hydromorphone.  Patient was admitted to internal medicine after discussion with Dr. Mccrary, who was on call for internal medicine.  I placed a consultation to orthopedics as well.    Natty Robledo MD  10:54 PM  4/20/2017      Other:   I have  discussed this case with another health care provider.       <> Summary of the Discussion: Dr. Mccrary, as above.             Scribe Attestation:   Scribe #1: I performed the above scribed service and the documentation accurately describes the services I performed. I attest to the accuracy of the note.    Attending Attestation:           Physician Attestation for Scribe:  Physician Attestation Statement for Scribe #1: I, Adelia Byers - ALFRED, reviewed documentation, as scribed by Dre Mccrary in my presence, and it is both accurate and complete.                 ED Course     Clinical Impression:   The primary encounter diagnosis was Septic joint. A diagnosis of Pain was also pertinent to this visit.    Disposition:   Disposition: Admitted  Condition: Stable       Natty Robledo MD  04/20/17 [id number]

## 2017-04-20 NOTE — ED TRIAGE NOTES
Reports waking this AM with pain to rt. Knee radiating to foot. Denies recent injury.  No OTC meds taken. Reports numbness to toes.

## 2017-04-21 ENCOUNTER — ANESTHESIA (OUTPATIENT)
Dept: SURGERY | Facility: HOSPITAL | Age: 77
DRG: 854 | End: 2017-04-21
Payer: MEDICARE

## 2017-04-21 ENCOUNTER — ANESTHESIA EVENT (OUTPATIENT)
Dept: SURGERY | Facility: HOSPITAL | Age: 77
DRG: 854 | End: 2017-04-21
Payer: MEDICARE

## 2017-04-21 LAB
ALBUMIN SERPL BCP-MCNC: 2.7 G/DL
ALP SERPL-CCNC: 95 U/L
ALT SERPL W/O P-5'-P-CCNC: 6 U/L
ANION GAP SERPL CALC-SCNC: 8 MMOL/L
AST SERPL-CCNC: 12 U/L
BASOPHILS # BLD AUTO: 0.05 K/UL
BASOPHILS NFR BLD: 0.4 %
BILIRUB SERPL-MCNC: 0.9 MG/DL
BODY FLD TYPE: NORMAL
BUN SERPL-MCNC: 14 MG/DL
CALCIUM SERPL-MCNC: 9.3 MG/DL
CHLORIDE SERPL-SCNC: 102 MMOL/L
CO2 SERPL-SCNC: 26 MMOL/L
CREAT SERPL-MCNC: 0.7 MG/DL
CRYSTALS FLD MICRO: NEGATIVE
DIFFERENTIAL METHOD: ABNORMAL
EOSINOPHIL # BLD AUTO: 0 K/UL
EOSINOPHIL NFR BLD: 0.2 %
ERYTHROCYTE [DISTWIDTH] IN BLOOD BY AUTOMATED COUNT: 14.5 %
EST. GFR  (AFRICAN AMERICAN): >60 ML/MIN/1.73 M^2
EST. GFR  (NON AFRICAN AMERICAN): >60 ML/MIN/1.73 M^2
ESTIMATED AVG GLUCOSE: 137 MG/DL
GLUCOSE SERPL-MCNC: 110 MG/DL
GRAM STN SPEC: NORMAL
GRAM STN SPEC: NORMAL
HBA1C MFR BLD HPLC: 6.4 %
HCT VFR BLD AUTO: 34.3 %
HGB BLD-MCNC: 11 G/DL
LYMPHOCYTES # BLD AUTO: 2.1 K/UL
LYMPHOCYTES NFR BLD: 15.6 %
MCH RBC QN AUTO: 26.8 PG
MCHC RBC AUTO-ENTMCNC: 32.1 %
MCV RBC AUTO: 84 FL
MONOCYTES # BLD AUTO: 1.7 K/UL
MONOCYTES NFR BLD: 13.2 %
NEUTROPHILS # BLD AUTO: 9.3 K/UL
NEUTROPHILS NFR BLD: 70.4 %
PATH INTERP FLD-IMP: NORMAL
PLATELET # BLD AUTO: 237 K/UL
PMV BLD AUTO: 11.4 FL
POCT GLUCOSE: 115 MG/DL (ref 70–110)
POCT GLUCOSE: 161 MG/DL (ref 70–110)
POCT GLUCOSE: 179 MG/DL (ref 70–110)
POTASSIUM SERPL-SCNC: 4.1 MMOL/L
PROT SERPL-MCNC: 6.5 G/DL
RBC # BLD AUTO: 4.11 M/UL
SODIUM SERPL-SCNC: 136 MMOL/L
T4 FREE SERPL-MCNC: 1.65 NG/DL
TSH SERPL DL<=0.005 MIU/L-ACNC: <0.01 UIU/ML
WBC # BLD AUTO: 13.22 K/UL

## 2017-04-21 PROCEDURE — 36000711: Performed by: ORTHOPAEDIC SURGERY

## 2017-04-21 PROCEDURE — 37000009 HC ANESTHESIA EA ADD 15 MINS: Performed by: ORTHOPAEDIC SURGERY

## 2017-04-21 PROCEDURE — 37000008 HC ANESTHESIA 1ST 15 MINUTES: Performed by: ORTHOPAEDIC SURGERY

## 2017-04-21 PROCEDURE — 63600175 PHARM REV CODE 636 W HCPCS

## 2017-04-21 PROCEDURE — 87205 SMEAR GRAM STAIN: CPT

## 2017-04-21 PROCEDURE — 36415 COLL VENOUS BLD VENIPUNCTURE: CPT

## 2017-04-21 PROCEDURE — 71000033 HC RECOVERY, INTIAL HOUR: Performed by: ORTHOPAEDIC SURGERY

## 2017-04-21 PROCEDURE — 63600175 PHARM REV CODE 636 W HCPCS: Performed by: ORTHOPAEDIC SURGERY

## 2017-04-21 PROCEDURE — 71000039 HC RECOVERY, EACH ADD'L HOUR: Performed by: ORTHOPAEDIC SURGERY

## 2017-04-21 PROCEDURE — 84443 ASSAY THYROID STIM HORMONE: CPT

## 2017-04-21 PROCEDURE — 25000003 PHARM REV CODE 250: Performed by: INTERNAL MEDICINE

## 2017-04-21 PROCEDURE — 84439 ASSAY OF FREE THYROXINE: CPT

## 2017-04-21 PROCEDURE — 36000710: Performed by: ORTHOPAEDIC SURGERY

## 2017-04-21 PROCEDURE — 25000003 PHARM REV CODE 250: Performed by: EMERGENCY MEDICINE

## 2017-04-21 PROCEDURE — 11000001 HC ACUTE MED/SURG PRIVATE ROOM

## 2017-04-21 PROCEDURE — 27200651 HC AIRWAY, LMA: Performed by: NURSE ANESTHETIST, CERTIFIED REGISTERED

## 2017-04-21 PROCEDURE — 63600175 PHARM REV CODE 636 W HCPCS: Performed by: NURSE ANESTHETIST, CERTIFIED REGISTERED

## 2017-04-21 PROCEDURE — D9220A PRA ANESTHESIA: Mod: ,,, | Performed by: ANESTHESIOLOGY

## 2017-04-21 PROCEDURE — 63600175 PHARM REV CODE 636 W HCPCS: Performed by: EMERGENCY MEDICINE

## 2017-04-21 PROCEDURE — 25000003 PHARM REV CODE 250: Performed by: HOSPITALIST

## 2017-04-21 PROCEDURE — 83036 HEMOGLOBIN GLYCOSYLATED A1C: CPT

## 2017-04-21 PROCEDURE — 25000003 PHARM REV CODE 250: Performed by: NURSE ANESTHETIST, CERTIFIED REGISTERED

## 2017-04-21 PROCEDURE — 63600175 PHARM REV CODE 636 W HCPCS: Performed by: INTERNAL MEDICINE

## 2017-04-21 PROCEDURE — 0SBC4ZZ EXCISION OF RIGHT KNEE JOINT, PERCUTANEOUS ENDOSCOPIC APPROACH: ICD-10-PCS | Performed by: ORTHOPAEDIC SURGERY

## 2017-04-21 PROCEDURE — 85025 COMPLETE CBC W/AUTO DIFF WBC: CPT

## 2017-04-21 PROCEDURE — 63600175 PHARM REV CODE 636 W HCPCS: Performed by: ANESTHESIOLOGY

## 2017-04-21 PROCEDURE — 36000705 HC OR TIME LEV I EA ADD 15 MIN: Performed by: ORTHOPAEDIC SURGERY

## 2017-04-21 PROCEDURE — 87075 CULTR BACTERIA EXCEPT BLOOD: CPT

## 2017-04-21 PROCEDURE — 36000704 HC OR TIME LEV I 1ST 15 MIN: Performed by: ORTHOPAEDIC SURGERY

## 2017-04-21 PROCEDURE — 27201423 OPTIME MED/SURG SUP & DEVICES STERILE SUPPLY: Performed by: ORTHOPAEDIC SURGERY

## 2017-04-21 PROCEDURE — 80053 COMPREHEN METABOLIC PANEL: CPT

## 2017-04-21 PROCEDURE — 87070 CULTURE OTHR SPECIMN AEROBIC: CPT

## 2017-04-21 RX ORDER — ACETAMINOPHEN 325 MG/1
650 TABLET ORAL EVERY 6 HOURS PRN
Status: DISCONTINUED | OUTPATIENT
Start: 2017-04-21 | End: 2017-04-24 | Stop reason: HOSPADM

## 2017-04-21 RX ORDER — PROPOFOL 10 MG/ML
VIAL (ML) INTRAVENOUS
Status: DISCONTINUED | OUTPATIENT
Start: 2017-04-21 | End: 2017-04-21

## 2017-04-21 RX ORDER — HYDROCHLOROTHIAZIDE 25 MG/1
25 TABLET ORAL DAILY
Status: DISCONTINUED | OUTPATIENT
Start: 2017-04-21 | End: 2017-04-24

## 2017-04-21 RX ORDER — MORPHINE SULFATE 10 MG/ML
2 INJECTION INTRAMUSCULAR; INTRAVENOUS; SUBCUTANEOUS EVERY 4 HOURS PRN
Status: DISCONTINUED | OUTPATIENT
Start: 2017-04-21 | End: 2017-04-24 | Stop reason: HOSPADM

## 2017-04-21 RX ORDER — INSULIN ASPART 100 [IU]/ML
1-10 INJECTION, SOLUTION INTRAVENOUS; SUBCUTANEOUS
Status: DISCONTINUED | OUTPATIENT
Start: 2017-04-21 | End: 2017-04-24 | Stop reason: HOSPADM

## 2017-04-21 RX ORDER — PROPYLTHIOURACIL 50 MG/1
50 TABLET ORAL EVERY 12 HOURS
Status: DISCONTINUED | OUTPATIENT
Start: 2017-04-21 | End: 2017-04-22

## 2017-04-21 RX ORDER — MORPHINE SULFATE 10 MG/ML
2 INJECTION INTRAMUSCULAR; INTRAVENOUS; SUBCUTANEOUS EVERY 4 HOURS PRN
Status: DISCONTINUED | OUTPATIENT
Start: 2017-04-21 | End: 2017-04-21

## 2017-04-21 RX ORDER — IBUPROFEN 200 MG
24 TABLET ORAL
Status: DISCONTINUED | OUTPATIENT
Start: 2017-04-21 | End: 2017-04-24 | Stop reason: HOSPADM

## 2017-04-21 RX ORDER — GABAPENTIN 300 MG/1
300 CAPSULE ORAL 3 TIMES DAILY
Status: DISCONTINUED | OUTPATIENT
Start: 2017-04-21 | End: 2017-04-24 | Stop reason: HOSPADM

## 2017-04-21 RX ORDER — ONDANSETRON 2 MG/ML
4 INJECTION INTRAMUSCULAR; INTRAVENOUS EVERY 12 HOURS PRN
Status: DISCONTINUED | OUTPATIENT
Start: 2017-04-21 | End: 2017-04-24 | Stop reason: HOSPADM

## 2017-04-21 RX ORDER — IBUPROFEN 200 MG
16 TABLET ORAL
Status: DISCONTINUED | OUTPATIENT
Start: 2017-04-21 | End: 2017-04-24 | Stop reason: HOSPADM

## 2017-04-21 RX ORDER — HYDROMORPHONE HYDROCHLORIDE 2 MG/ML
INJECTION, SOLUTION INTRAMUSCULAR; INTRAVENOUS; SUBCUTANEOUS
Status: COMPLETED
Start: 2017-04-21 | End: 2017-04-21

## 2017-04-21 RX ORDER — HYDROMORPHONE HYDROCHLORIDE 2 MG/ML
0.5 INJECTION, SOLUTION INTRAMUSCULAR; INTRAVENOUS; SUBCUTANEOUS EVERY 4 HOURS PRN
Status: DISCONTINUED | OUTPATIENT
Start: 2017-04-21 | End: 2017-04-21

## 2017-04-21 RX ORDER — METOPROLOL TARTRATE 50 MG/1
50 TABLET ORAL DAILY
Status: DISCONTINUED | OUTPATIENT
Start: 2017-04-21 | End: 2017-04-24 | Stop reason: HOSPADM

## 2017-04-21 RX ORDER — HYDROMORPHONE HYDROCHLORIDE 2 MG/ML
0.2 INJECTION, SOLUTION INTRAMUSCULAR; INTRAVENOUS; SUBCUTANEOUS EVERY 5 MIN PRN
Status: DISCONTINUED | OUTPATIENT
Start: 2017-04-21 | End: 2017-04-21 | Stop reason: HOSPADM

## 2017-04-21 RX ORDER — GLUCAGON 1 MG
1 KIT INJECTION
Status: DISCONTINUED | OUTPATIENT
Start: 2017-04-21 | End: 2017-04-24 | Stop reason: HOSPADM

## 2017-04-21 RX ORDER — FENTANYL CITRATE 50 UG/ML
INJECTION, SOLUTION INTRAMUSCULAR; INTRAVENOUS
Status: COMPLETED
Start: 2017-04-21 | End: 2017-04-21

## 2017-04-21 RX ORDER — MORPHINE SULFATE 10 MG/ML
4 INJECTION INTRAMUSCULAR; INTRAVENOUS; SUBCUTANEOUS EVERY 4 HOURS PRN
Status: DISCONTINUED | OUTPATIENT
Start: 2017-04-21 | End: 2017-04-21

## 2017-04-21 RX ORDER — METOCLOPRAMIDE HYDROCHLORIDE 5 MG/ML
INJECTION INTRAMUSCULAR; INTRAVENOUS
Status: COMPLETED
Start: 2017-04-21 | End: 2017-04-21

## 2017-04-21 RX ORDER — LIDOCAINE HCL/PF 100 MG/5ML
SYRINGE (ML) INTRAVENOUS
Status: DISCONTINUED | OUTPATIENT
Start: 2017-04-21 | End: 2017-04-21

## 2017-04-21 RX ORDER — MEPERIDINE HYDROCHLORIDE 50 MG/ML
12.5 INJECTION INTRAMUSCULAR; INTRAVENOUS; SUBCUTANEOUS ONCE AS NEEDED
Status: DISCONTINUED | OUTPATIENT
Start: 2017-04-21 | End: 2017-04-21 | Stop reason: HOSPADM

## 2017-04-21 RX ORDER — LORAZEPAM 2 MG/ML
0.25 INJECTION INTRAMUSCULAR ONCE AS NEEDED
Status: DISCONTINUED | OUTPATIENT
Start: 2017-04-21 | End: 2017-04-21 | Stop reason: HOSPADM

## 2017-04-21 RX ORDER — CILOSTAZOL 50 MG/1
50 TABLET ORAL 2 TIMES DAILY
Status: DISCONTINUED | OUTPATIENT
Start: 2017-04-21 | End: 2017-04-24 | Stop reason: HOSPADM

## 2017-04-21 RX ORDER — OXYCODONE AND ACETAMINOPHEN 5; 325 MG/1; MG/1
1 TABLET ORAL EVERY 6 HOURS PRN
Status: DISCONTINUED | OUTPATIENT
Start: 2017-04-21 | End: 2017-04-21

## 2017-04-21 RX ORDER — RAMIPRIL 2.5 MG/1
5 CAPSULE ORAL DAILY
Status: DISCONTINUED | OUTPATIENT
Start: 2017-04-21 | End: 2017-04-22

## 2017-04-21 RX ORDER — PANTOPRAZOLE SODIUM 40 MG/1
40 TABLET, DELAYED RELEASE ORAL DAILY
Status: DISCONTINUED | OUTPATIENT
Start: 2017-04-21 | End: 2017-04-24 | Stop reason: HOSPADM

## 2017-04-21 RX ORDER — ONDANSETRON 2 MG/ML
INJECTION INTRAMUSCULAR; INTRAVENOUS
Status: COMPLETED
Start: 2017-04-21 | End: 2017-04-21

## 2017-04-21 RX ORDER — SODIUM CHLORIDE 9 MG/ML
INJECTION, SOLUTION INTRAVENOUS CONTINUOUS
Status: DISCONTINUED | OUTPATIENT
Start: 2017-04-21 | End: 2017-04-22

## 2017-04-21 RX ORDER — DIPHENHYDRAMINE HYDROCHLORIDE 50 MG/ML
25 INJECTION INTRAMUSCULAR; INTRAVENOUS EVERY 6 HOURS PRN
Status: DISCONTINUED | OUTPATIENT
Start: 2017-04-21 | End: 2017-04-21 | Stop reason: HOSPADM

## 2017-04-21 RX ORDER — ENOXAPARIN SODIUM 100 MG/ML
40 INJECTION SUBCUTANEOUS EVERY 24 HOURS
Status: DISCONTINUED | OUTPATIENT
Start: 2017-04-21 | End: 2017-04-21

## 2017-04-21 RX ORDER — HYDRALAZINE HYDROCHLORIDE 20 MG/ML
10 INJECTION INTRAMUSCULAR; INTRAVENOUS EVERY 4 HOURS PRN
Status: DISCONTINUED | OUTPATIENT
Start: 2017-04-21 | End: 2017-04-24 | Stop reason: HOSPADM

## 2017-04-21 RX ORDER — ASPIRIN 81 MG/1
81 TABLET ORAL DAILY
Status: DISCONTINUED | OUTPATIENT
Start: 2017-04-21 | End: 2017-04-24 | Stop reason: HOSPADM

## 2017-04-21 RX ORDER — PROPYLTHIOURACIL 50 MG/1
100 TABLET ORAL EVERY 8 HOURS
Status: DISCONTINUED | OUTPATIENT
Start: 2017-04-21 | End: 2017-04-21

## 2017-04-21 RX ORDER — OXYCODONE AND ACETAMINOPHEN 5; 325 MG/1; MG/1
1 TABLET ORAL EVERY 4 HOURS PRN
Status: DISCONTINUED | OUTPATIENT
Start: 2017-04-21 | End: 2017-04-24 | Stop reason: HOSPADM

## 2017-04-21 RX ORDER — SODIUM CHLORIDE, SODIUM LACTATE, POTASSIUM CHLORIDE, CALCIUM CHLORIDE 600; 310; 30; 20 MG/100ML; MG/100ML; MG/100ML; MG/100ML
INJECTION, SOLUTION INTRAVENOUS CONTINUOUS PRN
Status: DISCONTINUED | OUTPATIENT
Start: 2017-04-21 | End: 2017-04-21

## 2017-04-21 RX ADMIN — GABAPENTIN 300 MG: 300 CAPSULE ORAL at 09:04

## 2017-04-21 RX ADMIN — HYDROMORPHONE HYDROCHLORIDE 0.2 MG: 2 INJECTION INTRAMUSCULAR; INTRAVENOUS; SUBCUTANEOUS at 02:04

## 2017-04-21 RX ADMIN — HYDROMORPHONE HYDROCHLORIDE 0.5 MG: 2 INJECTION INTRAMUSCULAR; INTRAVENOUS; SUBCUTANEOUS at 12:04

## 2017-04-21 RX ADMIN — MORPHINE SULFATE 4 MG: 10 INJECTION INTRAVENOUS at 07:04

## 2017-04-21 RX ADMIN — VANCOMYCIN HYDROCHLORIDE 750 MG: 750 INJECTION, POWDER, LYOPHILIZED, FOR SOLUTION INTRAVENOUS at 10:04

## 2017-04-21 RX ADMIN — FENTANYL CITRATE 50 MCG: 50 INJECTION INTRAMUSCULAR; INTRAVENOUS at 11:04

## 2017-04-21 RX ADMIN — FENTANYL CITRATE 50 MCG: 50 INJECTION INTRAMUSCULAR; INTRAVENOUS at 12:04

## 2017-04-21 RX ADMIN — METOPROLOL TARTRATE 50 MG: 50 TABLET ORAL at 01:04

## 2017-04-21 RX ADMIN — CILOSTAZOL 50 MG: 50 TABLET ORAL at 09:04

## 2017-04-21 RX ADMIN — PROPYLTHIOURACIL 100 MG: 50 TABLET ORAL at 07:04

## 2017-04-21 RX ADMIN — FENTANYL CITRATE 50 MCG: 50 INJECTION INTRAMUSCULAR; INTRAVENOUS at 01:04

## 2017-04-21 RX ADMIN — HYDROCORTISONE SODIUM SUCCINATE 25 MG: 100 INJECTION, POWDER, FOR SOLUTION INTRAMUSCULAR; INTRAVENOUS at 12:04

## 2017-04-21 RX ADMIN — PANTOPRAZOLE SODIUM 40 MG: 40 TABLET, DELAYED RELEASE ORAL at 07:04

## 2017-04-21 RX ADMIN — SODIUM CHLORIDE: 0.9 INJECTION, SOLUTION INTRAVENOUS at 01:04

## 2017-04-21 RX ADMIN — SODIUM CHLORIDE, SODIUM LACTATE, POTASSIUM CHLORIDE, AND CALCIUM CHLORIDE: .6; .31; .03; .02 INJECTION, SOLUTION INTRAVENOUS at 11:04

## 2017-04-21 RX ADMIN — MORPHINE SULFATE 4 MG: 10 INJECTION INTRAVENOUS at 01:04

## 2017-04-21 RX ADMIN — CEFTRIAXONE 2 G: 2 INJECTION, SOLUTION INTRAVENOUS at 09:04

## 2017-04-21 RX ADMIN — MORPHINE SULFATE 2 MG: 10 INJECTION INTRAVENOUS at 07:04

## 2017-04-21 RX ADMIN — OXYCODONE HYDROCHLORIDE AND ACETAMINOPHEN 1 TABLET: 5; 325 TABLET ORAL at 03:04

## 2017-04-21 RX ADMIN — PROPYLTHIOURACIL 50 MG: 50 TABLET ORAL at 09:04

## 2017-04-21 RX ADMIN — ONDANSETRON 4 MG: 2 INJECTION, SOLUTION INTRAMUSCULAR; INTRAVENOUS at 11:04

## 2017-04-21 RX ADMIN — ACETAMINOPHEN 650 MG: 325 TABLET, FILM COATED ORAL at 10:04

## 2017-04-21 RX ADMIN — METOCLOPRAMIDE 10 MG: 5 INJECTION, SOLUTION INTRAMUSCULAR; INTRAVENOUS at 11:04

## 2017-04-21 RX ADMIN — PROPOFOL 150 MG: 10 INJECTION, EMULSION INTRAVENOUS at 01:04

## 2017-04-21 RX ADMIN — LIDOCAINE HYDROCHLORIDE 100 MG: 20 INJECTION, SOLUTION INTRAVENOUS at 01:04

## 2017-04-21 NOTE — H&P
"[facility] Hospital Medicine  History & Physical    Patient Name: Khloe Kendrick  MRN: [id number]  Admission Date: 4/20/2017  Attending Physician: Alisson Tanner MD   Primary Care Provider: Aimee Marquis MD         Patient information was obtained from patient and ER records.     Subjective:     Principal Problem:Pyogenic arthritis of right knee joint    Chief Complaint:   Chief Complaint   Patient presents with    Knee Pain     States she has knee pain since she woke up this morning        HPI: 75 y/o female with AMD, CAD, DM type II, HTN, hyperthyroidism, osteoarthritis, and peripheral arterial disease presents to the ED c/o acute onset atraumatic R sided knee pain that radiates down back of R sided calf that started yesterday, Pain is severe (10/10). Movement and palpation exacerbates the pain. Pt reports similar symptoms last yr on her L leg; however, pt states that her doctor wasn't able to figure out the problem. Pt reports sleeping with her legs "twisted." Pt also reports itchiness to her bilateral arms and legs. Pt denies hx of blood clots. Pt denies fever, chills, or cough. No attempted treatment reported. No alleviating factors or other symptoms reported.patient has negative DVT study,X ray show no sign of fracture,she has has leucocytosis with elevated ESR and CRP,she had joint aspiration in ER ,which show many WBC,her right knee is swollen and very tender,no crystals has been seen,no major growth at this time,consulted Ortho and ID for further evaluation,keep patient NPO at this time.    Past Medical History:   Diagnosis Date    AMD (age-related macular degeneration), bilateral 6/10/2016    Cataract     Coronary artery disease     Diabetes mellitus, type 2     Hyperlipidemia     Hypertension     Hyperthyroidism 8/20/2015    Osteoarthritis     Peripheral arterial disease 4/3/2013       Past Surgical History:   Procedure Laterality Date    ABDOMINAL SURGERY      " APPENDECTOMY      CARDIAC SURGERY  2008    CABG    CATARACT EXTRACTION Right     CATARACT EXTRACTION W/  INTRAOCULAR LENS IMPLANT  8/13/2015    Dr. Blount ( [facility])    EYE SURGERY  2011    Rt eye cataract    HERNIA REPAIR      HYSTERECTOMY      HYSTEROTOMY N/A 33 yrs old       Review of patient's allergies indicates:   Allergen Reactions    Pravastatin Other (See Comments)     Muscle pain       No current facility-administered medications on file prior to encounter.      Current Outpatient Prescriptions on File Prior to Encounter   Medication Sig    aspirin 81 MG Chew Take 81 mg by mouth once daily.    cetirizine (ZYRTEC) 10 MG tablet TAKE 1 TABLET (10 MG TOTAL) BY MOUTH DAILY AS NEEDED FOR ALLERGIES OR RHINITIS.    dexamethasone (DECADRON) 0.75 MG Tab Take 1 tablet (0.75 mg total) by mouth 2 (two) times daily.    gabapentin (NEURONTIN) 300 MG capsule Take 1 capsule (300 mg total) by mouth every evening.    glipiZIDE (GLUCOTROL) 10 MG tablet TAKE TWO PILLS BY MOUTH EVERY MORNING AND ONE PILL BY MOUTH EVERY EVENING.    hydrochlorothiazide (HYDRODIURIL) 25 MG tablet TAKE 1 TABLET (25 MG TOTAL) BY MOUTH ONCE DAILY.    hydrOXYzine (ATARAX) 25 MG tablet TAKE ONE OR TWO TABLETS EVERY 4-6 HOURS AS NEEDED FOR ITCHING. THIS MAY MAKE YOU MORE PRONE TO FALLING.    metformin (GLUCOPHAGE) 500 MG tablet Take 1 tablet (500 mg total) by mouth 2 (two) times daily with meals.    metoprolol tartrate (LOPRESSOR) 50 MG tablet Take 1 tablet (50 mg total) by mouth once daily.    ramipril (ALTACE) 5 MG capsule Take 1 capsule (5 mg total) by mouth once daily.    ACCU-CHEK SMARTVIEW TEST STRIP Strp     ALCOHOL PREP PADS PadM     azithromycin (Z-MARYCHUY) 250 MG tablet TAKE 2 TABLETS BY MOUTH TODAY, THEN TAKE 1 TABLET DAILY FOR 4 DAYS    cilostazol (PLETAL) 50 MG Tab Take 1 tablet (50 mg total) by mouth once daily.    diclofenac (VOLTAREN) 50 MG EC tablet Take 1 tablet (50 mg total) by mouth 3 (three) times daily as needed.     fluticasone (FLONASE) 50 mcg/actuation nasal spray USE 1 SPRAY BY EACH NARE ROUTE ONCE DAILY.    ibuprofen (ADVIL,MOTRIN) 200 MG tablet Take 2 tablets (400 mg total) by mouth every 6 (six) hours as needed for Pain.    lancets 30 gauge Misc     lancing device Misc     lidocaine-prilocaine (EMLA) cream Apply topically as needed. To left shoulder    methylPREDNISolone (MEDROL DOSEPACK) 4 mg tablet use as directed    nitroGLYCERIN (NITROSTAT) 0.4 MG SL tablet Place 1 tablet (0.4 mg total) under the tongue every 5 (five) minutes as needed for Chest pain. 1 Tablet, Sublingual Sublingual as needed .  as directed    propylthiouracil (PTU) 50 mg Tab TAKE 2 TABLETS BY MOUTH EVERY 8 HOURS     Family History     Problem Relation (Age of Onset)    Cataracts Father, Sister    Coronary artery disease Father    Diabetes Mother    Heart disease Brother    No Known Problems Maternal Aunt, Maternal Uncle, Paternal Aunt, Paternal Uncle, Maternal Grandmother, Maternal Grandfather, Paternal Grandmother, Paternal Grandfather        Social History Main Topics    Smoking status: Current Every Day Smoker     Packs/day: 1.00    Smokeless tobacco: Not on file    Alcohol use No    Drug use: No    Sexual activity: Not on file     Review of Systems   Constitutional: Negative for activity change and appetite change.   HENT: Negative for congestion and dental problem.    Eyes: Negative for discharge and itching.   Respiratory: Negative for apnea and chest tightness.    Cardiovascular: Negative for chest pain.   Gastrointestinal: Negative for abdominal distention and abdominal pain.   Endocrine: Negative for cold intolerance.   Genitourinary: Negative for dyspareunia.   Musculoskeletal: Positive for joint swelling.        Right knee   Skin: Negative for color change and pallor.   Allergic/Immunologic: Negative for environmental allergies and food allergies.   Neurological: Negative for dizziness and facial asymmetry.   Hematological:  Negative for adenopathy. Does not bruise/bleed easily.   Psychiatric/Behavioral: Negative for agitation and behavioral problems.     Objective:     Vital Signs (Most Recent):  Temp: 99.5 °F (37.5 °C) (04/21/17 0310)  Pulse: 96 (04/21/17 0310)  Resp: 16 (04/21/17 0310)  BP: (!) 177/75 (04/21/17 0310)  SpO2: 98 % (04/21/17 0310) Vital Signs (24h Range):  Temp:  [99.3 °F (37.4 °C)-99.6 °F (37.6 °C)] 99.5 °F (37.5 °C)  Pulse:  [] 96  Resp:  [16-18] 16  SpO2:  [95 %-98 %] 98 %  BP: (139-210)/(63-86) 177/75     Weight: 54 kg (119 lb)  Body mass index is 21.77 kg/(m^2).    Physical Exam   Constitutional: She is oriented to person, place, and time. No distress.   HENT:   Head: Atraumatic.   Eyes: EOM are normal. Pupils are equal, round, and reactive to light.   Neck: Normal range of motion. Neck supple.   Cardiovascular: Normal rate and regular rhythm.    Pulmonary/Chest: Effort normal and breath sounds normal.   Abdominal: Soft.   Musculoskeletal: Normal range of motion. She exhibits tenderness.   Swollen and tender right knee   Neurological: She is oriented to person, place, and time. No cranial nerve deficit. Coordination normal.   Skin: Skin is warm and dry. She is not diaphoretic.   Psychiatric: She has a normal mood and affect. Her behavior is normal.        Significant Labs:   BMP:   Recent Labs  Lab 04/21/17  0421         K 4.1      CO2 26   BUN 14   CREATININE 0.7   CALCIUM 9.3     CBC:   Recent Labs  Lab 04/20/17  1711 04/21/17  0421   WBC 16.94* 13.22*   HGB 12.7 11.0*   HCT 38.7 34.3*    237       Significant Imaging: reviewed    Assessment/Plan:     * Pyogenic arthritis of right knee joint  Will continue with broad spectrum IV Abx,folow cultures,ortho planing for right knee wash out today.consulted ID.      Diabetes mellitus type 2, controlled  on SSI.      Essential hypertension  Continue with home medication,use prn IV Hydralazine.      Combined hyperlipidemia associated with  type 2 diabetes mellitus  On statin.      Osteoarthritis  More on the knees,pain management,PT,OT      Peripheral arterial disease  On pletal.ASA      Hyperthyroidism  On PTU,check TSH.      VTE Risk Mitigation         Ordered     Medium Risk of VTE  Once      04/21/17 0105     Place sequential compression device  Until discontinued      04/21/17 0105        Alisson Tanner MD  Department of Hospital Medicine   [facility]

## 2017-04-21 NOTE — PLAN OF CARE
Problem: Patient Care Overview  Goal: Plan of Care Review  Outcome: Ongoing (interventions implemented as appropriate)  Plan of care reviewed with patient and daughter Heather at bedside. Patient reports mild relief from R knee pain and able to rest with current pharmacological interventions. IV fluids maintained. Toiletting offered frequently. Patient able to use bedpan with 1-person assist. Fall precautions reviewed with patient and daughter, who remained at bedside overnight. Skin integrity maintained. Call light in reach.  No acute issues during shift. Will continue to monitor. Continue plan of care.

## 2017-04-21 NOTE — NURSING
Patient transported off floor for Ortho Surgery to Right knee.  Patient is A/Ox4, no SOB or discomfort.   Pre-op check list and surgical scrub complete.

## 2017-04-21 NOTE — ED NOTES
Patient placed on continuous automatic blood pressure cuff and continuous pulse oximeter.  O2 @ 2L NC

## 2017-04-21 NOTE — OP NOTE
DATE OF PROCEDURE:  04/21/2017    PREOPERATIVE DIAGNOSIS:  Right knee septic arthritis.    POSTOPERATIVE DIAGNOSIS:  Right knee septic arthritis.    PROCEDURES:  Right knee arthroscopy with lavage and limited synovectomy.    SURGEON:  Javier Collins M.D.    ASSISTANT:  Rola Rodriguez.    COMPLICATIONS:  None.    IMPLANTS:  None.    BLOOD LOSS:  10 mL.    PROCEDURE IN DETAIL:  After proper consents were obtained, the patient was taken   to the Operating Room and administered general anesthesia.  Right lower   extremity was prepped and draped in normal sterile fashion.  Incisions were made   about the medial and lateral aspects of the patellar tendon and diagnostic   arthroscopy was performed prior to placing any fluid in the joint.  The cannula   was introduced.  Cloudy fluid with some debris was expressed through the cannula   and this was cultured and sent to the lab.  6 liters normal saline were then   irrigated through the joint and taken out by the shaver.  Synovectomy was   performed in the suprapatellar pouch, medial or lateral gutter and across a   portion of the anterior joint.  Debris was removed from the medial and lateral   compartments as well.  Once the wound was completely irrigated and synovectomy   was completed, arthroscopic instrumentation was removed from the joint.  Portals   were closed with 4-0 nylon.  Sterile dressings were applied.  Tourniquet was   deflated.  The patient was aroused in Operating Room, transferred to Recovery in   stable condition.      PRACHI/  dd: 04/21/2017 14:02:42 (CDT)  td: 04/21/2017 15:29:01 (CDT)  Doc ID   #[id number]  Job ID #[id number]    CC:

## 2017-04-21 NOTE — ANESTHESIA PREPROCEDURE EVALUATION
04/21/2017  Khloe Kendrick is a 76 y.o., female.    Anesthesia Evaluation    I have reviewed the Patient Summary Reports.     I have reviewed the Medications.   Decadron    Review of Systems  Anesthesia Hx:  No problems with previous Anesthesia  History of prior surgery of interest to airway management or planning:   Social:  No Alcohol Use, Smoker    Hematology/Oncology:         -- Anemia:   EENT/Dental:   chronic allergic rhinitis   Cardiovascular:   Hypertension CAD  CABG/stent  Angina PVD hyperlipidemia > 4 mets  Asymptomatic carotid artery stenosis without infarction  Peripheral arterial disease   Atherosclerosis of aorta     Musculoskeletal:   Arthritis     Endocrine:   Diabetes, type 2        Physical Exam  General:  Well nourished    Airway/Jaw/Neck:  Airway Findings: Mouth Opening: Normal Tongue: Normal  General Airway Assessment: Adult  Mallampati: III  Improves to II with phonation.  TM Distance: Normal, at least 6 cm  Jaw/Neck Findings:  Neck ROM: Normal ROM      Dental:  Dental Findings: Lower Dentures, Upper Dentures   Chest/Lungs:  Chest/Lungs Findings: Normal Respiratory Rate     Heart/Vascular:  Heart Findings: Rate: Normal        Mental Status:  Mental Status Findings:  Cooperative, Alert and Oriented         Anesthesia Plan  Type of Anesthesia, risks & benefits discussed:  Anesthesia Type:  general  Patient's Preference:   Intra-op Monitoring Plan: standard ASA monitors  Intra-op Monitoring Plan Comments:   Post Op Pain Control Plan:   Post Op Pain Control Plan Comments:   Induction:   IV  Beta Blocker:  Patient is on a Beta-Blocker and has received one dose within the past 24 hours (No further documentation required).       Informed Consent: Patient understands risks and agrees with Anesthesia plan.  Questions answered. Anesthesia consent signed with patient.  ASA Score: 3     Day of  Surgery Review of History & Physical:    H&P update referred to the surgeon.         Ready For Surgery From Anesthesia Perspective.

## 2017-04-21 NOTE — CONSULTS
77 yo female with a pmhx of DK2, CAD, and HTN presented tot he ED with a CC of right knee pain and swelling for 2 days. She denies any recent injury but does report feeling feverish at home. Right knee was aspirated in the ED.  VSS, except for elevated BP. BPmax 210/86    Right knee: Skin intact.  + Effusion. + Warmth and erythema. ROM limited due to pain.  WBC: 13.22  Sed Rt: 31  Right knee cultures: No growth   XRay right knee: No displaced fracture. Unchanged small suprapatellar synovial complex/effusion. 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      A/P: Right knee effusion  1) NPO  2) to OR today for right knee ATS/washout

## 2017-04-21 NOTE — PLAN OF CARE
SW attempted to meet with patient to complete discharge needs assessment, patient off unit for surgery.       04/21/17 1449   Discharge Assessment   Assessment Type Discharge Planning Assessment

## 2017-04-21 NOTE — PLAN OF CARE
Problem: Patient Care Overview  Goal: Plan of Care Review  Outcome: Ongoing (interventions implemented as appropriate)  Patient is A/Ox4, remains free from falls, and states pain is well managed with ordered medicines.  Patient is resting comfortably after today's Right knee I&D, and post-op dressing remains clean/dry/and intact. Patient is tolerating a regular diet well.  Will continue to monitor patient for pain, safety and s/s's of infection.

## 2017-04-21 NOTE — BRIEF OP NOTE
[facility]  Brief Operative Note    SUMMARY     Surgery Date: 4/21/2017     Surgeon(s) and Role:     * Javier Collins MD - Primary    Assisting Surgeon: Michael    Pre-op Diagnosis:  Septic joint [M00.9] Rt knee    Post-op Diagnosis:  Post-Op Diagnosis Codes:     * Septic joint [M00.9]    Procedure(s) (LRB):  INCISION AND DRAINAGE-KNEE arthroscopic (Right)    Anesthesia: General    Description of Procedure: Rt knee ATS    Description of the findings of the procedure: synovitis, cloudy fluid    Estimated Blood Loss: 10ml         Specimens:   Specimen     None      Cx to lab

## 2017-04-21 NOTE — PT/OT/SLP PROGRESS
Occupational Therapy      Khloe Drew Kendrick  MRN: [id number]    Patient not seen today secondary to patient off unit for R knee effusion with  ATS/washout. OT to clarify orders with MD post procedure and follow-up on next available date.    Freda Moreno, OT  4/21/2017

## 2017-04-21 NOTE — TRANSFER OF CARE
"Anesthesia Transfer of Care Note    Patient: Khloe Kendrick    Procedure(s) Performed: Procedure(s) (LRB):  INCISION AND DRAINAGE-KNEE arthroscopic (Right)    Patient location: PACU    Anesthesia Type: general    Transport from OR: Transported from OR on room air with adequate spontaneous ventilation    Post pain: adequate analgesia    Post assessment: no apparent anesthetic complications    Post vital signs: stable    Level of consciousness: awake, alert and oriented    Nausea/Vomiting: no nausea/vomiting    Complications: none          Last vitals:   Visit Vitals    BP (!) 181/74    Pulse 98    Temp 37.7 °C (99.9 °F) (Oral)    Resp 16    Ht 5' 2" (1.575 m)    Wt 54 kg (119 lb)    SpO2 100%    Breastfeeding No    BMI 21.77 kg/m2     "

## 2017-04-21 NOTE — PLAN OF CARE
Problem: Pain, Acute (Adult)  Goal: Acceptable Pain Control/Comfort Level  Patient will demonstrate the desired outcomes by discharge/transition of care.  Outcome: Ongoing (interventions implemented as appropriate)  Patient reports increased pain with movement but able to rest comfortably without signs of distress after pain medications given per orders and quiet environment provided for rest.

## 2017-04-21 NOTE — NURSING
Patient admitted to unit accompanied by her daughters, Heather and Jennifer.  She complains of 10/10 R knee pain unrelieved with prior pharmacological interventions given in ED.  RN provided reassurance and discussed plan of care with patient and Heather.  MD notified of ineffective pain management and new orders received for morphine Q4H and percocet Q6H PRN.  Patient reports inadequate relief after 4mg IV morphine dose given, continues to present with agitation, restlessness, and grimacing and reports increased pain with movement.  Vital signs collected again and percocet tab given.  Patient able to move more easily in bed, use bedpan, and presents with less distress after percocet dose given.  Care clustered to provide patient and daughter with opportunity to rest.

## 2017-04-21 NOTE — ASSESSMENT & PLAN NOTE
Will continue with broad spectrum IV Abx,folow cultures,ortho planing for right knee wash out today.consulted ID.

## 2017-04-21 NOTE — SUBJECTIVE & OBJECTIVE
Past Medical History:   Diagnosis Date    AMD (age-related macular degeneration), bilateral 6/10/2016    Cataract     Coronary artery disease     Diabetes mellitus, type 2     Hyperlipidemia     Hypertension     Hyperthyroidism 8/20/2015    Osteoarthritis     Peripheral arterial disease 4/3/2013       Past Surgical History:   Procedure Laterality Date    ABDOMINAL SURGERY      APPENDECTOMY      CARDIAC SURGERY  2008    CABG    CATARACT EXTRACTION Right     CATARACT EXTRACTION W/  INTRAOCULAR LENS IMPLANT  8/13/2015    Dr. Blount ( [facility])    EYE SURGERY  2011    Rt eye cataract    HERNIA REPAIR      HYSTERECTOMY      HYSTEROTOMY N/A 33 yrs old       Review of patient's allergies indicates:   Allergen Reactions    Pravastatin Other (See Comments)     Muscle pain       No current facility-administered medications on file prior to encounter.      Current Outpatient Prescriptions on File Prior to Encounter   Medication Sig    aspirin 81 MG Chew Take 81 mg by mouth once daily.    cetirizine (ZYRTEC) 10 MG tablet TAKE 1 TABLET (10 MG TOTAL) BY MOUTH DAILY AS NEEDED FOR ALLERGIES OR RHINITIS.    dexamethasone (DECADRON) 0.75 MG Tab Take 1 tablet (0.75 mg total) by mouth 2 (two) times daily.    gabapentin (NEURONTIN) 300 MG capsule Take 1 capsule (300 mg total) by mouth every evening.    glipiZIDE (GLUCOTROL) 10 MG tablet TAKE TWO PILLS BY MOUTH EVERY MORNING AND ONE PILL BY MOUTH EVERY EVENING.    hydrochlorothiazide (HYDRODIURIL) 25 MG tablet TAKE 1 TABLET (25 MG TOTAL) BY MOUTH ONCE DAILY.    hydrOXYzine (ATARAX) 25 MG tablet TAKE ONE OR TWO TABLETS EVERY 4-6 HOURS AS NEEDED FOR ITCHING. THIS MAY MAKE YOU MORE PRONE TO FALLING.    metformin (GLUCOPHAGE) 500 MG tablet Take 1 tablet (500 mg total) by mouth 2 (two) times daily with meals.    metoprolol tartrate (LOPRESSOR) 50 MG tablet Take 1 tablet (50 mg total) by mouth once daily.    ramipril (ALTACE) 5 MG capsule Take 1 capsule (5 mg total)  by mouth once daily.    ACCU-CHEK SMARTVIEW TEST STRIP Strp     ALCOHOL PREP PADS PadM     azithromycin (Z-MARYCHUY) 250 MG tablet TAKE 2 TABLETS BY MOUTH TODAY, THEN TAKE 1 TABLET DAILY FOR 4 DAYS    cilostazol (PLETAL) 50 MG Tab Take 1 tablet (50 mg total) by mouth once daily.    diclofenac (VOLTAREN) 50 MG EC tablet Take 1 tablet (50 mg total) by mouth 3 (three) times daily as needed.    fluticasone (FLONASE) 50 mcg/actuation nasal spray USE 1 SPRAY BY EACH NARE ROUTE ONCE DAILY.    ibuprofen (ADVIL,MOTRIN) 200 MG tablet Take 2 tablets (400 mg total) by mouth every 6 (six) hours as needed for Pain.    lancets 30 gauge Misc     lancing device Misc     lidocaine-prilocaine (EMLA) cream Apply topically as needed. To left shoulder    methylPREDNISolone (MEDROL DOSEPACK) 4 mg tablet use as directed    nitroGLYCERIN (NITROSTAT) 0.4 MG SL tablet Place 1 tablet (0.4 mg total) under the tongue every 5 (five) minutes as needed for Chest pain. 1 Tablet, Sublingual Sublingual as needed .  as directed    propylthiouracil (PTU) 50 mg Tab TAKE 2 TABLETS BY MOUTH EVERY 8 HOURS     Family History     Problem Relation (Age of Onset)    Cataracts Father, Sister    Coronary artery disease Father    Diabetes Mother    Heart disease Brother    No Known Problems Maternal Aunt, Maternal Uncle, Paternal Aunt, Paternal Uncle, Maternal Grandmother, Maternal Grandfather, Paternal Grandmother, Paternal Grandfather        Social History Main Topics    Smoking status: Current Every Day Smoker     Packs/day: 1.00    Smokeless tobacco: Not on file    Alcohol use No    Drug use: No    Sexual activity: Not on file     Review of Systems   Constitutional: Negative for activity change and appetite change.   HENT: Negative for congestion and dental problem.    Eyes: Negative for discharge and itching.   Respiratory: Negative for apnea and chest tightness.    Cardiovascular: Negative for chest pain.   Gastrointestinal: Negative for  abdominal distention and abdominal pain.   Endocrine: Negative for cold intolerance.   Genitourinary: Negative for dyspareunia.   Musculoskeletal: Positive for joint swelling.        Right knee   Skin: Negative for color change and pallor.   Allergic/Immunologic: Negative for environmental allergies and food allergies.   Neurological: Negative for dizziness and facial asymmetry.   Hematological: Negative for adenopathy. Does not bruise/bleed easily.   Psychiatric/Behavioral: Negative for agitation and behavioral problems.     Objective:     Vital Signs (Most Recent):  Temp: 99.5 °F (37.5 °C) (04/21/17 0310)  Pulse: 96 (04/21/17 0310)  Resp: 16 (04/21/17 0310)  BP: (!) 177/75 (04/21/17 0310)  SpO2: 98 % (04/21/17 0310) Vital Signs (24h Range):  Temp:  [99.3 °F (37.4 °C)-99.6 °F (37.6 °C)] 99.5 °F (37.5 °C)  Pulse:  [] 96  Resp:  [16-18] 16  SpO2:  [95 %-98 %] 98 %  BP: (139-210)/(63-86) 177/75     Weight: 54 kg (119 lb)  Body mass index is 21.77 kg/(m^2).    Physical Exam   Constitutional: She is oriented to person, place, and time. No distress.   HENT:   Head: Atraumatic.   Eyes: EOM are normal. Pupils are equal, round, and reactive to light.   Neck: Normal range of motion. Neck supple.   Cardiovascular: Normal rate and regular rhythm.    Pulmonary/Chest: Effort normal and breath sounds normal.   Abdominal: Soft.   Musculoskeletal: Normal range of motion. She exhibits tenderness.   Swollen and tender right knee   Neurological: She is oriented to person, place, and time. No cranial nerve deficit. Coordination normal.   Skin: Skin is warm and dry. She is not diaphoretic.   Psychiatric: She has a normal mood and affect. Her behavior is normal.        Significant Labs:   BMP:   Recent Labs  Lab 04/21/17 0421         K 4.1      CO2 26   BUN 14   CREATININE 0.7   CALCIUM 9.3     CBC:   Recent Labs  Lab 04/20/17  1711 04/21/17  0421   WBC 16.94* 13.22*   HGB 12.7 11.0*   HCT 38.7 34.3*     237       Significant Imaging: reviewed

## 2017-04-22 PROBLEM — A41.9 SEPSIS: Status: ACTIVE | Noted: 2017-04-22

## 2017-04-22 LAB
ALBUMIN SERPL BCP-MCNC: 2.2 G/DL
ALP SERPL-CCNC: 79 U/L
ALT SERPL W/O P-5'-P-CCNC: 5 U/L
ANION GAP SERPL CALC-SCNC: 6 MMOL/L
AST SERPL-CCNC: 12 U/L
BASOPHILS # BLD AUTO: 0.04 K/UL
BASOPHILS NFR BLD: 0.4 %
BILIRUB SERPL-MCNC: 0.7 MG/DL
BUN SERPL-MCNC: 17 MG/DL
CALCIUM SERPL-MCNC: 9 MG/DL
CHLORIDE SERPL-SCNC: 108 MMOL/L
CO2 SERPL-SCNC: 27 MMOL/L
CREAT SERPL-MCNC: 0.7 MG/DL
DIFFERENTIAL METHOD: ABNORMAL
EOSINOPHIL # BLD AUTO: 0 K/UL
EOSINOPHIL NFR BLD: 0.3 %
ERYTHROCYTE [DISTWIDTH] IN BLOOD BY AUTOMATED COUNT: 14.2 %
EST. GFR  (AFRICAN AMERICAN): >60 ML/MIN/1.73 M^2
EST. GFR  (NON AFRICAN AMERICAN): >60 ML/MIN/1.73 M^2
GLUCOSE SERPL-MCNC: 121 MG/DL
HCT VFR BLD AUTO: 32.6 %
HGB BLD-MCNC: 10.3 G/DL
LYMPHOCYTES # BLD AUTO: 1.9 K/UL
LYMPHOCYTES NFR BLD: 18.6 %
MCH RBC QN AUTO: 26.7 PG
MCHC RBC AUTO-ENTMCNC: 31.6 %
MCV RBC AUTO: 85 FL
MONOCYTES # BLD AUTO: 1.3 K/UL
MONOCYTES NFR BLD: 12.4 %
NEUTROPHILS # BLD AUTO: 7.1 K/UL
NEUTROPHILS NFR BLD: 68.1 %
PLATELET # BLD AUTO: 194 K/UL
PMV BLD AUTO: 11.4 FL
POCT GLUCOSE: 110 MG/DL (ref 70–110)
POCT GLUCOSE: 151 MG/DL (ref 70–110)
POCT GLUCOSE: 169 MG/DL (ref 70–110)
POTASSIUM SERPL-SCNC: 3.8 MMOL/L
PROT SERPL-MCNC: 5.6 G/DL
RBC # BLD AUTO: 3.86 M/UL
SODIUM SERPL-SCNC: 141 MMOL/L
WBC # BLD AUTO: 10.44 K/UL

## 2017-04-22 PROCEDURE — 97161 PT EVAL LOW COMPLEX 20 MIN: CPT

## 2017-04-22 PROCEDURE — 63600175 PHARM REV CODE 636 W HCPCS: Performed by: HOSPITALIST

## 2017-04-22 PROCEDURE — 11000001 HC ACUTE MED/SURG PRIVATE ROOM

## 2017-04-22 PROCEDURE — 25000003 PHARM REV CODE 250: Performed by: INTERNAL MEDICINE

## 2017-04-22 PROCEDURE — 25000003 PHARM REV CODE 250: Performed by: HOSPITALIST

## 2017-04-22 PROCEDURE — 85025 COMPLETE CBC W/AUTO DIFF WBC: CPT

## 2017-04-22 PROCEDURE — 25000003 PHARM REV CODE 250: Performed by: EMERGENCY MEDICINE

## 2017-04-22 PROCEDURE — G8988 SELF CARE GOAL STATUS: HCPCS | Mod: CI

## 2017-04-22 PROCEDURE — G8987 SELF CARE CURRENT STATUS: HCPCS | Mod: CJ

## 2017-04-22 PROCEDURE — 87040 BLOOD CULTURE FOR BACTERIA: CPT

## 2017-04-22 PROCEDURE — 97165 OT EVAL LOW COMPLEX 30 MIN: CPT

## 2017-04-22 PROCEDURE — 80053 COMPREHEN METABOLIC PANEL: CPT

## 2017-04-22 PROCEDURE — 36415 COLL VENOUS BLD VENIPUNCTURE: CPT

## 2017-04-22 PROCEDURE — 63600175 PHARM REV CODE 636 W HCPCS: Performed by: EMERGENCY MEDICINE

## 2017-04-22 RX ORDER — RAMIPRIL 2.5 MG/1
10 CAPSULE ORAL DAILY
Status: DISCONTINUED | OUTPATIENT
Start: 2017-04-22 | End: 2017-04-24

## 2017-04-22 RX ORDER — IBUPROFEN 400 MG/1
400 TABLET ORAL EVERY 6 HOURS PRN
Status: DISCONTINUED | OUTPATIENT
Start: 2017-04-22 | End: 2017-04-24 | Stop reason: HOSPADM

## 2017-04-22 RX ORDER — ENOXAPARIN SODIUM 100 MG/ML
40 INJECTION SUBCUTANEOUS EVERY 24 HOURS
Status: DISCONTINUED | OUTPATIENT
Start: 2017-04-22 | End: 2017-04-24 | Stop reason: HOSPADM

## 2017-04-22 RX ORDER — PROPYLTHIOURACIL 50 MG/1
100 TABLET ORAL EVERY 12 HOURS
Status: DISCONTINUED | OUTPATIENT
Start: 2017-04-22 | End: 2017-04-24 | Stop reason: HOSPADM

## 2017-04-22 RX ADMIN — GABAPENTIN 300 MG: 300 CAPSULE ORAL at 06:04

## 2017-04-22 RX ADMIN — RAMIPRIL 10 MG: 2.5 CAPSULE ORAL at 09:04

## 2017-04-22 RX ADMIN — PROPYLTHIOURACIL 100 MG: 50 TABLET ORAL at 08:04

## 2017-04-22 RX ADMIN — ENOXAPARIN SODIUM 40 MG: 100 INJECTION SUBCUTANEOUS at 04:04

## 2017-04-22 RX ADMIN — METOPROLOL TARTRATE 50 MG: 50 TABLET ORAL at 09:04

## 2017-04-22 RX ADMIN — CILOSTAZOL 50 MG: 50 TABLET ORAL at 09:04

## 2017-04-22 RX ADMIN — ACETAMINOPHEN 650 MG: 325 TABLET, FILM COATED ORAL at 08:04

## 2017-04-22 RX ADMIN — GABAPENTIN 300 MG: 300 CAPSULE ORAL at 01:04

## 2017-04-22 RX ADMIN — VANCOMYCIN HYDROCHLORIDE 750 MG: 750 INJECTION, POWDER, LYOPHILIZED, FOR SOLUTION INTRAVENOUS at 10:04

## 2017-04-22 RX ADMIN — INSULIN ASPART 2 UNITS: 100 INJECTION, SOLUTION INTRAVENOUS; SUBCUTANEOUS at 12:04

## 2017-04-22 RX ADMIN — CILOSTAZOL 50 MG: 50 TABLET ORAL at 08:04

## 2017-04-22 RX ADMIN — HYDROCHLOROTHIAZIDE 25 MG: 25 TABLET ORAL at 09:04

## 2017-04-22 RX ADMIN — PROPYLTHIOURACIL 100 MG: 50 TABLET ORAL at 10:04

## 2017-04-22 RX ADMIN — PANTOPRAZOLE SODIUM 40 MG: 40 TABLET, DELAYED RELEASE ORAL at 09:04

## 2017-04-22 RX ADMIN — ASPIRIN 81 MG: 81 TABLET, COATED ORAL at 09:04

## 2017-04-22 RX ADMIN — IBUPROFEN 400 MG: 400 TABLET, FILM COATED ORAL at 08:04

## 2017-04-22 NOTE — PLAN OF CARE
Problem: Occupational Therapy Goal  Goal: Occupational Therapy Goal  Goals to be met by: 4/29/2017     Patient will increase functional independence with ADLs by performing:    LE Dressing with Minimal Assistance.  Stand pivot transfers with Stand-by Assistance.  Upper extremity exercise program with supervision.  Outcome: Ongoing (interventions implemented as appropriate)  Patient tolerated evaluation well, good participation.  Patient will benefit from skilled OT services to address the above mentioned goals. SALEEM Holley, MS

## 2017-04-22 NOTE — PROGRESS NOTES
"[facility]  Progress Note    Patient Name: Khloe Kendrick  MRN: [id number]  Patient Class: IP- Inpatient   Admission Date: 4/20/2017  Length of Stay: 2 days  Attending Physician: Alisson Tanner MD  Primary Care Provider: Aimee Marquis MD        Subjective:     Principal Problem:Pyogenic arthritis of right knee joint    HPI:  77 y/o female with AMD, CAD, DM type II, HTN, hyperthyroidism, osteoarthritis, and peripheral arterial disease presents to the ED c/o acute onset atraumatic R sided knee pain that radiates down back of R sided calf that started yesterday, Pain is severe (10/10). Movement and palpation exacerbates the pain. Pt reports similar symptoms last yr on her L leg; however, pt states that her doctor wasn't able to figure out the problem. Pt reports sleeping with her legs "twisted." Pt also reports itchiness to her bilateral arms and legs. Pt denies hx of blood clots. Pt denies fever, chills, or cough. No attempted treatment reported. No alleviating factors or other symptoms reported.patient has negative DVT study,X ray show no sign of fracture,she has has leucocytosis with elevated ESR and CRP,she had joint aspiration in ER ,which show many WBC,her right knee is swollen and very tender,no crystals has been seen,no major growth at this time,consulted Ortho and ID for further evaluation,keep patient NPO at this time.    Hospital Course:  77 y/o female with AMD, CAD, DM type II, HTN, hyperthyroidism, osteoarthritis, and peripheral arterial disease presents to the ED c/o acute onset atraumatic R sided knee pain that radiates down back of R sided calf that started yesterday, Pain is severe (10/10). Movement and palpation exacerbates the pain. Pt reports similar symptoms last yr on her L leg; however, pt states that her doctor wasn't able to figure out the problem. Pt reports sleeping with her legs "twisted." Pt also reports itchiness to her bilateral arms and " legs. Pt denies hx of blood clots. Pt denies fever, chills, or cough. No attempted treatment reported. No alleviating factors or other symptoms reported.patient has negative DVT study,X ray show no sign of fracture,she has has leucocytosis with elevated ESR and CRP,she had joint aspiration in ER ,which show many WBC,her right knee is swollen and very tender,no crystals has been seen,no major growth at this time,consulted Ortho and ID for further evaluation,S/P washout by ortho.follow cultures.    Past Medical History:   Diagnosis Date    AMD (age-related macular degeneration), bilateral 6/10/2016    Cataract     Coronary artery disease     Diabetes mellitus, type 2     Hyperlipidemia     Hypertension     Hyperthyroidism 8/20/2015    Osteoarthritis     Peripheral arterial disease 4/3/2013       Past Surgical History:   Procedure Laterality Date    ABDOMINAL SURGERY      APPENDECTOMY      CARDIAC SURGERY  2008    CABG    CATARACT EXTRACTION Right     CATARACT EXTRACTION W/  INTRAOCULAR LENS IMPLANT  8/13/2015    Dr. Blount ( [facility])    EYE SURGERY  2011    Rt eye cataract    HERNIA REPAIR      HYSTERECTOMY      HYSTEROTOMY N/A 33 yrs old       Review of patient's allergies indicates:   Allergen Reactions    Pravastatin Other (See Comments)     Muscle pain       No current facility-administered medications on file prior to encounter.      Current Outpatient Prescriptions on File Prior to Encounter   Medication Sig    aspirin 81 MG Chew Take 81 mg by mouth once daily.    cetirizine (ZYRTEC) 10 MG tablet TAKE 1 TABLET (10 MG TOTAL) BY MOUTH DAILY AS NEEDED FOR ALLERGIES OR RHINITIS.    dexamethasone (DECADRON) 0.75 MG Tab Take 1 tablet (0.75 mg total) by mouth 2 (two) times daily.    gabapentin (NEURONTIN) 300 MG capsule Take 1 capsule (300 mg total) by mouth every evening.    glipiZIDE (GLUCOTROL) 10 MG tablet TAKE TWO PILLS BY MOUTH EVERY MORNING AND ONE PILL BY MOUTH EVERY EVENING.     hydrochlorothiazide (HYDRODIURIL) 25 MG tablet TAKE 1 TABLET (25 MG TOTAL) BY MOUTH ONCE DAILY.    hydrOXYzine (ATARAX) 25 MG tablet TAKE ONE OR TWO TABLETS EVERY 4-6 HOURS AS NEEDED FOR ITCHING. THIS MAY MAKE YOU MORE PRONE TO FALLING.    metformin (GLUCOPHAGE) 500 MG tablet Take 1 tablet (500 mg total) by mouth 2 (two) times daily with meals.    metoprolol tartrate (LOPRESSOR) 50 MG tablet Take 1 tablet (50 mg total) by mouth once daily.    ramipril (ALTACE) 5 MG capsule Take 1 capsule (5 mg total) by mouth once daily.    ACCU-CHEK SMARTVIEW TEST STRIP Strp     ALCOHOL PREP PADS PadM     azithromycin (Z-MARYCHUY) 250 MG tablet TAKE 2 TABLETS BY MOUTH TODAY, THEN TAKE 1 TABLET DAILY FOR 4 DAYS    cilostazol (PLETAL) 50 MG Tab Take 1 tablet (50 mg total) by mouth once daily.    diclofenac (VOLTAREN) 50 MG EC tablet Take 1 tablet (50 mg total) by mouth 3 (three) times daily as needed.    fluticasone (FLONASE) 50 mcg/actuation nasal spray USE 1 SPRAY BY EACH NARE ROUTE ONCE DAILY.    ibuprofen (ADVIL,MOTRIN) 200 MG tablet Take 2 tablets (400 mg total) by mouth every 6 (six) hours as needed for Pain.    lancets 30 gauge Misc     lancing device Misc     lidocaine-prilocaine (EMLA) cream Apply topically as needed. To left shoulder    methylPREDNISolone (MEDROL DOSEPACK) 4 mg tablet use as directed    nitroGLYCERIN (NITROSTAT) 0.4 MG SL tablet Place 1 tablet (0.4 mg total) under the tongue every 5 (five) minutes as needed for Chest pain. 1 Tablet, Sublingual Sublingual as needed .  as directed    propylthiouracil (PTU) 50 mg Tab TAKE 2 TABLETS BY MOUTH EVERY 8 HOURS     Family History     Problem Relation (Age of Onset)    Cataracts Father, Sister    Coronary artery disease Father    Diabetes Mother    Heart disease Brother    No Known Problems Maternal Aunt, Maternal Uncle, Paternal Aunt, Paternal Uncle, Maternal Grandmother, Maternal Grandfather, Paternal Grandmother, Paternal Grandfather        Social  History Main Topics    Smoking status: Current Every Day Smoker     Packs/day: 1.00    Smokeless tobacco: Not on file    Alcohol use No    Drug use: No    Sexual activity: Not on file     Review of Systems   Constitutional: Negative for activity change and appetite change.   HENT: Negative for congestion and dental problem.    Eyes: Negative for discharge and itching.   Respiratory: Negative for apnea and chest tightness.    Cardiovascular: Negative for chest pain.   Gastrointestinal: Negative for abdominal distention and abdominal pain.   Endocrine: Negative for cold intolerance.   Genitourinary: Negative for dyspareunia.   Musculoskeletal: Positive for joint swelling.        Right knee   Skin: Negative for color change and pallor.   Allergic/Immunologic: Negative for environmental allergies and food allergies.   Neurological: Negative for dizziness and facial asymmetry.   Hematological: Negative for adenopathy. Does not bruise/bleed easily.   Psychiatric/Behavioral: Negative for agitation and behavioral problems.     Objective:     Vital Signs (Most Recent):  Temp: 98.4 °F (36.9 °C) (04/22/17 0730)  Pulse: 104 (04/22/17 0730)  Resp: 17 (04/22/17 0730)  BP: (!) 146/55 (04/22/17 0730)  SpO2: (!) 90 % (04/22/17 0730) Vital Signs (24h Range):  Temp:  [98.4 °F (36.9 °C)-101.4 °F (38.6 °C)] 98.4 °F (36.9 °C)  Pulse:  [] 104  Resp:  [14-23] 17  SpO2:  [90 %-100 %] 90 %  BP: (146-185)/(55-75) 146/55     Weight: 54 kg (119 lb)  Body mass index is 21.77 kg/(m^2).    Physical Exam   Constitutional: She is oriented to person, place, and time. No distress.   HENT:   Head: Atraumatic.   Eyes: EOM are normal. Pupils are equal, round, and reactive to light.   Neck: Normal range of motion. Neck supple.   Cardiovascular: Normal rate and regular rhythm.    Pulmonary/Chest: Effort normal and breath sounds normal.   Abdominal: Soft.   Musculoskeletal: Normal range of motion. She exhibits tenderness.   Swollen and tender  right knee   Neurological: She is oriented to person, place, and time. No cranial nerve deficit. Coordination normal.   Skin: Skin is warm and dry. She is not diaphoretic.   Psychiatric: She has a normal mood and affect. Her behavior is normal.        Significant Labs:   BMP:     Recent Labs  Lab 04/22/17  0451   *      K 3.8      CO2 27   BUN 17   CREATININE 0.7   CALCIUM 9.0     CBC:     Recent Labs  Lab 04/20/17  1711 04/21/17  0421 04/22/17  0451   WBC 16.94* 13.22* 10.44   HGB 12.7 11.0* 10.3*   HCT 38.7 34.3* 32.6*    237 194       Significant Imaging: reviewed    Assessment/Plan:      * Pyogenic arthritis of right knee joint  Will continue with broad spectrum IV Abx,follow cultures,S/P washout by Ortho,follow cultures.      Diabetes mellitus type 2, controlled  on SSI.      Essential hypertension  Continue with home medication,use prn IV Hydralazine.      Combined hyperlipidemia associated with type 2 diabetes mellitus  On statin.      Osteoarthritis  More on the knees,pain management,PT,OT      Peripheral arterial disease  On pletal.ASA      Hyperthyroidism  On PTU,checked TSH.free T 4,not controlled,increased PTU,will arrange follow up with her endo. Before discharge.      Sepsis  With fever,leucocytosis,tachycardia,duo to right septic joint,on IV Abx.      VTE Risk Mitigation         Ordered     enoxaparin injection 40 mg  Daily     Route:  Subcutaneous        04/22/17 0903     Medium Risk of VTE  Once      04/21/17 0105     Place sequential compression device  Until discontinued      04/21/17 0105          Alisson Tanner MD  Department of Hospital Medicine   [facility]

## 2017-04-22 NOTE — PROGRESS NOTES
Patient states she is feeling better but still having c/o pain.   BPmax 185/75 Tmax 101.4    Right knee: Dressing c/d/i. NVI distally. Ace wrap loosened.  WBC: 10.44  Hct: 32.6  Cultures: pending    A/P: POD#1 Right knee ATS/septic joint  1) up with PT  2) BP mgmt per primary team  3) will cont to follow- will recheck for culture results

## 2017-04-22 NOTE — PLAN OF CARE
[id number] - [place], [place] - [address]  [address] [address]  Phone: [phone] Fax: [phone]    [facility] - [place], [place] - [address]  [address] [address]  Phone: [phone] Fax: [phone]       04/22/17 1235   Discharge Assessment   Assessment Type Discharge Planning Assessment   Confirmed/corrected address and phone number on facesheet? Yes   Assessment information obtained from? Patient   Prior to hospitilization cognitive status: Alert/Oriented   Prior to hospitalization functional status: Independent   Current cognitive status: Alert/Oriented   Current Functional Status: Independent   Arrived From home or self-care   Lives With child(ni), adult   Able to Return to Prior Arrangements yes   Is patient able to care for self after discharge? Yes   How many people do you have in your home that can help with your care after discharge? 3   Who are your caregiver(s) and their phone number(s)? Daughters    Patient's perception of discharge disposition home or selfcare   Readmission Within The Last 30 Days no previous admission in last 30 days   Equipment Currently Used at Home glucometer   Do you have any problems affording any of your prescribed medications? No   Is the patient taking medications as prescribed? yes   Do you have any financial concerns preventing you from receiving the healthcare you need? No   Does the patient have transportation to healthcare appointments? Yes   Transportation Available family or friend will provide  (Daughters)   On Dialysis? No   Discharge Plan A Home with family   Discharge Plan B Home with family;Home Health   Patient/Family In Agreement With Plan yes

## 2017-04-22 NOTE — CONSULTS
Consult Note  Infectious Disease    Consult Requested By: Alisson Tanner MD    Reason for Consult: rt knee septic arthritis    SUBJECTIVE:     History of Present Illness:  Patient is a 76 y.o. female presents with rt knee pain and swelling. She denies a fall. She denies injections into knee. She denies skin and soft tissue infections prior. She became ill with a rt knee swelling and pain on the day of admit. She was noted to have synovial fluid that was with 73656 wbc with 97% segs.she had no crystals. Cultures grew nothing. She was taken to the or and a wash out performed 4/21/17. Cultures are negative so far. She has intermittent fever. She is edentulous. She denies dysuria.  Past Medical History:   Diagnosis Date    AMD (age-related macular degeneration), bilateral 6/10/2016    Cataract     Coronary artery disease     Diabetes mellitus, type 2     Hyperlipidemia     Hypertension     Hyperthyroidism 8/20/2015    Osteoarthritis     Peripheral arterial disease 4/3/2013     Past Surgical History:   Procedure Laterality Date    ABDOMINAL SURGERY      APPENDECTOMY      CARDIAC SURGERY  2008    CABG    CATARACT EXTRACTION Right     CATARACT EXTRACTION W/  INTRAOCULAR LENS IMPLANT  8/13/2015    Dr. Blount ( [facility])    EYE SURGERY  2011    Rt eye cataract    HERNIA REPAIR      HYSTERECTOMY      HYSTEROTOMY N/A 33 yrs old     Family History   Problem Relation Age of Onset    Diabetes Mother     Coronary artery disease Father     Cataracts Father     Heart disease Brother     Cataracts Sister     No Known Problems Maternal Aunt     No Known Problems Maternal Uncle     No Known Problems Paternal Aunt     No Known Problems Paternal Uncle     No Known Problems Maternal Grandmother     No Known Problems Maternal Grandfather     No Known Problems Paternal Grandmother     No Known Problems Paternal Grandfather     Amblyopia Neg Hx     Blindness Neg Hx     Cancer Neg Hx     Glaucoma Neg  Hx     Hypertension Neg Hx     Macular degeneration Neg Hx     Retinal detachment Neg Hx     Strabismus Neg Hx     Stroke Neg Hx     Thyroid disease Neg Hx      Social History   Substance Use Topics    Smoking status: Current Every Day Smoker     Packs/day: 1.00    Smokeless tobacco: None    Alcohol use No       Review of patient's allergies indicates:   Allergen Reactions    Pravastatin Other (See Comments)     Muscle pain        Antibiotics     Start     Stop Route Frequency Ordered    04/21/17 2200  cefTRIAXone (ROCEPHIN) 2 g in dextrose 5 % 50 mL IVPB      -- IV Every 24 hours (non-standard times) 04/21/17 0105    04/21/17 2330  vancomycin 750 mg in dextrose 5 % 250 mL IVPB (ready to mix system)  (Vancomycin IVPB with levels panel)      -- IV Every 24 hours (non-standard times) 04/21/17 0105          Review of Systems:  Constitutional: positive for chills, fevers and malaise  Eyes: no visual changes  ENT: no nasal congestion or sore throat  Respiratory: no cough or shortness of breath  Cardiovascular: no chest pain or palpitations  Gastrointestinal: no nausea or vomiting, no abdominal pain or change in bowel habits  Genitourinary: no hematuria or dysuria  Hematologic/Lymphatic: no easy bruising or lymphadenopathy  Musculoskeletal: rt knee hurts    OBJECTIVE:     Vital Signs (Most Recent)  Temp: 98.4 °F (36.9 °C) (04/22/17 0730)  Pulse: 104 (04/22/17 0730)  Resp: 17 (04/22/17 0730)  BP: (!) 146/55 (04/22/17 0730)  SpO2: (!) 90 % (04/22/17 0730)    Temperature Range Min/Max (Last 24H):  Temp:  [98.4 °F (36.9 °C)-101.4 °F (38.6 °C)]     Physical Exam:  General: well developed, well nourished  Eyes: conjunctivae/corneas clear. PERRL..  HENT: Head:normocephalic, atraumatic. Ears:not examined. Nose: Nares normal. Septum midline. Mucosa normal. No drainage or sinus tenderness., no discharge. Throat: lips, mucosa, and tongue normal; teeth and gums normal and no throat erythema.  Neck: supple, symmetrical,  trachea midline, no JVD and thyroid not enlarged, symmetric, no tenderness/mass/nodules  Lungs:  clear to auscultation bilaterally and normal respiratory effort  Cardiovascular: Heart: regular rate and rhythm, S1, S2 normal, no murmur, click, rub or gallop. Chest Wall: no tenderness. Extremitiesrt knee is bandaged Pulses: 2+ and symmetric.  Abdomen/Rectal: Abdomen: soft, non-tender non-distented; bowel sounds normal; no masses,  no organomegaly. Rectal: not examined  Skin: Skin color, texture, turgor normal. No rashes or lesions  Musculoskeletal:no clubbing, cyanosis  Lymph Nodes: No cervical or supraclavicular adenopathy    Laboratory:  CBC    Recent Labs  Lab 04/22/17  0451   WBC 10.44   RBC 3.86*   HGB 10.3*   HCT 32.6*        BMP    Recent Labs  Lab 04/22/17  0451   CO2 27   BUN 17   CREATININE 0.7   CALCIUM 9.0       Recent Labs  Lab 04/20/17 1957   COLORU Yellow   SPECGRAV 1.015   PHUR 5.0   PROTEINUA Negative   BACTERIA Occasional   NITRITE Negative   LEUKOCYTESUR Negative   UROBILINOGEN Negative     Microbiology Results (last 7 days)     Procedure Component Value Units Date/Time    Culture, Body Fluid (Aerobic) w/ GS [[id number]] Collected:  04/20/17 1931    Order Status:  Completed Specimen:  Joint Fluid from Knee, Right Updated:  04/22/17 1040     AEROBIC CULTURE - FLUID No growth     Gram Stain Result Cytospin indicates:      Many WBC's      No organisms seen    Blood culture [[id number]] Collected:  04/20/17 2214    Order Status:  Completed Specimen:  Blood from Peripheral, Antecubital, Left Updated:  04/21/17 2303     Blood Culture, Routine No Growth to date     Blood Culture, Routine No Growth to date    Blood Culture #2 **CANNOT BE ORDERED STAT** [[id number]] Collected:  04/20/17 2220    Order Status:  Completed Specimen:  Blood from Peripheral, Antecubital, Right Updated:  04/21/17 2303     Blood Culture, Routine No Growth to date     Blood Culture, Routine No Growth to date    Gram stain  [[id number]] Collected:  04/21/17 1408    Order Status:  Completed Specimen:  Joint Fluid from Knee, Right Updated:  04/21/17 1502     Gram Stain Result Moderate WBC's      No organisms seen    Narrative:       Right knee    Culture, Anaerobe [[id number]] Collected:  04/21/17 1408    Order Status:  Sent Specimen:  Joint Fluid from Knee, Right Updated:  04/21/17 1440    Narrative:       Right knee    Aerobic culture [[id number]] Collected:  04/21/17 1408    Order Status:  Sent Specimen:  Joint Fluid from Knee, Right Updated:  04/21/17 1439    Narrative:       Right knee    Culture, Body Fluid (Aerobic) w/ GS [[id number]]     Order Status:  No result Specimen:  Joint Fluid from Knee, Right     Gram stain [[id number]] Collected:  04/20/17 1931    Order Status:  Canceled Specimen:  Joint Fluid from Knee, Right Updated:  04/1939    Narrative:       Gram Stain was cancelled on 04/21/2017 at 08:10 by JGO; Duplicate   order, test included in another profile. 04/21/2017  08:10          Diagnostic Results:  Labs: Reviewed  X-Ray: Reviewed    ASSESSMENT/PLAN:     Active Hospital Problems    Diagnosis  POA    *Pyogenic arthritis of right knee joint [M00.9]  Yes    Sepsis [A41.9]  Yes    Hyperthyroidism [E05.90]  Yes     Chronic    Peripheral arterial disease [I73.9]  Yes     Chronic    Diabetes mellitus type 2, controlled [E11.9]  Yes     Chronic    Essential hypertension [I10]  Yes     Chronic    Combined 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       1. Rt knee septic arthritis ? Unsure how this happened  Note rt bakers cyst- could this have been infected  Iv vanco and rocephin pending cultures  Not sexually active and no ivdu or shots into joint

## 2017-04-22 NOTE — ASSESSMENT & PLAN NOTE
On PTU,checked TSH.free T 4,not controlled,increased PTU,will arrange follow up with her endo. Before discharge.

## 2017-04-22 NOTE — PT/OT/SLP EVAL
"Occupational Therapy  Evaluation    Khloe Kendrick   MRN: [id number]   Admitting Diagnosis: Pyogenic arthritis of right knee joint    OT Date of Treatment: 04/22/17   OT Start Time: 1151  OT Stop Time: 1207  OT Total Time (min): 16 min    Billable Minutes:  Evaluation 16 minutes (co-eval with PT)    Diagnosis: Pyogenic arthritis of right knee joint       Past Medical History:   Diagnosis Date    AMD (age-related macular degeneration), bilateral 6/10/2016    Cataract     Coronary artery disease     Diabetes mellitus, type 2     Hyperlipidemia     Hypertension     Hyperthyroidism 8/20/2015    Osteoarthritis     Peripheral arterial disease 4/3/2013      Past Surgical History:   Procedure Laterality Date    ABDOMINAL SURGERY      APPENDECTOMY      CARDIAC SURGERY  2008    CABG    CATARACT EXTRACTION Right     CATARACT EXTRACTION W/  INTRAOCULAR LENS IMPLANT  8/13/2015    Dr. Blount ( [facility])    EYE SURGERY  2011    Rt eye cataract    HERNIA REPAIR      HYSTERECTOMY      HYSTEROTOMY N/A 33 yrs old       Referring physician: Dr. Tanner  Date referred to OT: 4/21/2017    General Precautions: Standard, fall  Orthopedic Precautions: Full weight bearing  Braces: N/A    Do you have any cultural, spiritual, Jainism conflicts, given your current situation?: none     Patient History:  Living Environment  Lives With: child(ni), adult (son)  Living Arrangements: house  Home Accessibility: stairs to enter home  Number of Stairs to Enter Home: 4  Stair Railings at Home: none  Transportation Available: family or friend will provide  Equipment Currently Used at Home: none    Prior level of function:   Bed Mobility/Transfers: independent  Grooming: independent  Bathing: independent  Upper Body Dressing: independent  Lower Body Dressing: independent  Toileting: independent        Dominant hand: right    Subjective:  Communicated with nurse prior to session.    Chief Complaint: "My knee hurts and you want me to " "get up?"  Patient/Family stated goals: to return home with family assistance    Pain Rating:  (no number available however pating yelling out every time her R leg was touched)  Location - Side: Right     Location: knee  Pain Addressed: Reposition, Cessation of Activity, Distraction, Nurse notified       Objective:  Patient found with: peripheral IV (bandages on R knee)    Cognitive Exam:  Oriented to: Person, Place and Situation  Follows Commands/attention: Follows one-step commands  Communication: clear/fluent  Memory:  No Deficits noted  Safety awareness/insight to disability: intact  Coping skills/emotional control: Appropriate to situation    Visual/perceptual:  Intact    Physical Exam:  Postural examination/scapula alignment: No postural abnormalities identified  Skin integrity: Visible skin intact  Edema: Moderate R knee    Sensation:   Intact    Upper Extremity Range of Motion:  Right Upper Extremity: WFL  Left Upper Extremity: WFL    Upper Extremity Strength:  Right Upper Extremity: WFL  Left Upper Extremity: WFL   Strength: WFL    Fine motor coordination:   Intact    Gross motor coordination: WFL    Functional Mobility:  Bed Mobility:  Rolling/Turning to Left: Contact guard assistance, With side rail  Scooting/Bridging: Stand by Assistance  Supine to Sit: Minimum Assistance    Transfers:  Sit <> Stand Assistance: Minimum Assistance  Sit <> Stand Assistive Device: Rolling Walker  Toilet Transfer Assistance: Contact Guard Assistance  Toilet Transfer Assistive Device: Rolling Walker, grab bar    Functional Ambulation: ambulated with PT and RW    Activities of Daily Living:  Feeding Level of Assistance: Modified independent  UE Dressing Level of Assistance: Modified independent  LE Dressing Level of Assistance: Moderate assistance  Grooming Position: Standing at sink (with RW)  Grooming Level of Assistance: Stand by assistance  Toileting Where Assessed: Toilet  Toileting Level of Assistance: " "Independent    Balance:   Static Sit: GOOD: Takes MODERATE challenges from all directions  Dynamic Sit: FAIR+: Maintains balance through MINIMAL excursions of active trunk motion  Static Stand: FAIR+: Takes MINIMAL challenges from all directions  Dynamic stand: FAIR: Needs CONTACT GUARD during gait    AM-PAC 6 CLICK ADL  How much help from another person does this patient currently need?  1 = Unable, Total/Dependent Assistance  2 = A lot, Maximum/Moderate Assistance  3 = A little, Minimum/Contact Guard/Supervision  4 = None, Modified Cherry/Independent    Putting on and taking off regular lower body clothing? : 3  Bathing (including washing, rinsing, drying)?: 3  Toileting, which includes using toilet, bedpan, or urinal? : 4  Putting on and taking off regular upper body clothing?: 4  Taking care of personal grooming such as brushing teeth?: 4  Eating meals?: 4  Total Score: 22    AM-PAC Raw Score CMS "G-Code Modifier Level of Impairment Assistance   6 % Total / Unable   7 - 9 CM 80 - 100% Maximal Assist   10 - 14 CL 60 - 80% Moderate Assist   15 - 19 CK 40 - 60% Moderate Assist   20 - 22 CJ 20 - 40% Minimal Assist   23 CI 1-20% SBA / CGA   24 CH 0% Independent/ Mod I       Patient left up in chair with all lines intact, call button in reach, nurse notified and family present    Assessment:  Khloe Kendrick is a 76 y.o. female with a medical diagnosis of Pyogenic arthritis of right knee joint and presents with  independence for self-care and functional transfers.    Rehab identified problem list/impairments: Rehab identified problem list/impairments: impaired endurance, impaired functional mobilty, impaired balance, pain, decreased safety awareness, edema, impaired joint extensibility    Rehab potential is good.    Activity tolerance: Good    Discharge recommendations: Discharge Facility/Level Of Care Needs: home health PT     Barriers to discharge: Barriers to Discharge: Decreased caregiver " support (will stay with one of her daughters)    Equipment recommendations: walker, rolling, shower chair     GOALS:   Occupational Therapy Goals        Problem: Occupational Therapy Goal    Goal Priority Disciplines Outcome Interventions   Occupational Therapy Goal     OT, PT/OT Ongoing (interventions implemented as appropriate)    Description:  Goals to be met by: 4/29/2017     Patient will increase functional independence with ADLs by performing:    LE Dressing with Minimal Assistance.  Stand pivot transfers with Stand-by Assistance.  Upper extremity exercise program with supervision.                PLAN:  Patient to be seen 3 x/week to address the above listed problems via self-care/home management, therapeutic exercises, therapeutic activities  Plan of Care expires: 04/29/17  Plan of Care reviewed with: patient, daughter    OT G-codes  Functional Assessment Tool Used: [facility]  Score: 22  Functional Limitation: Self care  Self Care Current Status (): ALEXANDRA  Self Care Goal Status (): SALEEM Crowder, MS  04/22/2017

## 2017-04-22 NOTE — SUBJECTIVE & OBJECTIVE
Past Medical History:   Diagnosis Date    AMD (age-related macular degeneration), bilateral 6/10/2016    Cataract     Coronary artery disease     Diabetes mellitus, type 2     Hyperlipidemia     Hypertension     Hyperthyroidism 8/20/2015    Osteoarthritis     Peripheral arterial disease 4/3/2013       Past Surgical History:   Procedure Laterality Date    ABDOMINAL SURGERY      APPENDECTOMY      CARDIAC SURGERY  2008    CABG    CATARACT EXTRACTION Right     CATARACT EXTRACTION W/  INTRAOCULAR LENS IMPLANT  8/13/2015    Dr. Blount ( [facility])    EYE SURGERY  2011    Rt eye cataract    HERNIA REPAIR      HYSTERECTOMY      HYSTEROTOMY N/A 33 yrs old       Review of patient's allergies indicates:   Allergen Reactions    Pravastatin Other (See Comments)     Muscle pain       No current facility-administered medications on file prior to encounter.      Current Outpatient Prescriptions on File Prior to Encounter   Medication Sig    aspirin 81 MG Chew Take 81 mg by mouth once daily.    cetirizine (ZYRTEC) 10 MG tablet TAKE 1 TABLET (10 MG TOTAL) BY MOUTH DAILY AS NEEDED FOR ALLERGIES OR RHINITIS.    dexamethasone (DECADRON) 0.75 MG Tab Take 1 tablet (0.75 mg total) by mouth 2 (two) times daily.    gabapentin (NEURONTIN) 300 MG capsule Take 1 capsule (300 mg total) by mouth every evening.    glipiZIDE (GLUCOTROL) 10 MG tablet TAKE TWO PILLS BY MOUTH EVERY MORNING AND ONE PILL BY MOUTH EVERY EVENING.    hydrochlorothiazide (HYDRODIURIL) 25 MG tablet TAKE 1 TABLET (25 MG TOTAL) BY MOUTH ONCE DAILY.    hydrOXYzine (ATARAX) 25 MG tablet TAKE ONE OR TWO TABLETS EVERY 4-6 HOURS AS NEEDED FOR ITCHING. THIS MAY MAKE YOU MORE PRONE TO FALLING.    metformin (GLUCOPHAGE) 500 MG tablet Take 1 tablet (500 mg total) by mouth 2 (two) times daily with meals.    metoprolol tartrate (LOPRESSOR) 50 MG tablet Take 1 tablet (50 mg total) by mouth once daily.    ramipril (ALTACE) 5 MG capsule Take 1 capsule (5 mg total)  by mouth once daily.    ACCU-CHEK SMARTVIEW TEST STRIP Strp     ALCOHOL PREP PADS PadM     azithromycin (Z-MARYCHUY) 250 MG tablet TAKE 2 TABLETS BY MOUTH TODAY, THEN TAKE 1 TABLET DAILY FOR 4 DAYS    cilostazol (PLETAL) 50 MG Tab Take 1 tablet (50 mg total) by mouth once daily.    diclofenac (VOLTAREN) 50 MG EC tablet Take 1 tablet (50 mg total) by mouth 3 (three) times daily as needed.    fluticasone (FLONASE) 50 mcg/actuation nasal spray USE 1 SPRAY BY EACH NARE ROUTE ONCE DAILY.    ibuprofen (ADVIL,MOTRIN) 200 MG tablet Take 2 tablets (400 mg total) by mouth every 6 (six) hours as needed for Pain.    lancets 30 gauge Misc     lancing device Misc     lidocaine-prilocaine (EMLA) cream Apply topically as needed. To left shoulder    methylPREDNISolone (MEDROL DOSEPACK) 4 mg tablet use as directed    nitroGLYCERIN (NITROSTAT) 0.4 MG SL tablet Place 1 tablet (0.4 mg total) under the tongue every 5 (five) minutes as needed for Chest pain. 1 Tablet, Sublingual Sublingual as needed .  as directed    propylthiouracil (PTU) 50 mg Tab TAKE 2 TABLETS BY MOUTH EVERY 8 HOURS     Family History     Problem Relation (Age of Onset)    Cataracts Father, Sister    Coronary artery disease Father    Diabetes Mother    Heart disease Brother    No Known Problems Maternal Aunt, Maternal Uncle, Paternal Aunt, Paternal Uncle, Maternal Grandmother, Maternal Grandfather, Paternal Grandmother, Paternal Grandfather        Social History Main Topics    Smoking status: Current Every Day Smoker     Packs/day: 1.00    Smokeless tobacco: Not on file    Alcohol use No    Drug use: No    Sexual activity: Not on file     Review of Systems   Constitutional: Negative for activity change and appetite change.   HENT: Negative for congestion and dental problem.    Eyes: Negative for discharge and itching.   Respiratory: Negative for apnea and chest tightness.    Cardiovascular: Negative for chest pain.   Gastrointestinal: Negative for  abdominal distention and abdominal pain.   Endocrine: Negative for cold intolerance.   Genitourinary: Negative for dyspareunia.   Musculoskeletal: Positive for joint swelling.        Right knee   Skin: Negative for color change and pallor.   Allergic/Immunologic: Negative for environmental allergies and food allergies.   Neurological: Negative for dizziness and facial asymmetry.   Hematological: Negative for adenopathy. Does not bruise/bleed easily.   Psychiatric/Behavioral: Negative for agitation and behavioral problems.     Objective:     Vital Signs (Most Recent):  Temp: 98.4 °F (36.9 °C) (04/22/17 0730)  Pulse: 104 (04/22/17 0730)  Resp: 17 (04/22/17 0730)  BP: (!) 146/55 (04/22/17 0730)  SpO2: (!) 90 % (04/22/17 0730) Vital Signs (24h Range):  Temp:  [98.4 °F (36.9 °C)-101.4 °F (38.6 °C)] 98.4 °F (36.9 °C)  Pulse:  [] 104  Resp:  [14-23] 17  SpO2:  [90 %-100 %] 90 %  BP: (146-185)/(55-75) 146/55     Weight: 54 kg (119 lb)  Body mass index is 21.77 kg/(m^2).    Physical Exam   Constitutional: She is oriented to person, place, and time. No distress.   HENT:   Head: Atraumatic.   Eyes: EOM are normal. Pupils are equal, round, and reactive to light.   Neck: Normal range of motion. Neck supple.   Cardiovascular: Normal rate and regular rhythm.    Pulmonary/Chest: Effort normal and breath sounds normal.   Abdominal: Soft.   Musculoskeletal: Normal range of motion. She exhibits tenderness.   Swollen and tender right knee   Neurological: She is oriented to person, place, and time. No cranial nerve deficit. Coordination normal.   Skin: Skin is warm and dry. She is not diaphoretic.   Psychiatric: She has a normal mood and affect. Her behavior is normal.        Significant Labs:   BMP:     Recent Labs  Lab 04/22/17  0451   *      K 3.8      CO2 27   BUN 17   CREATININE 0.7   CALCIUM 9.0     CBC:     Recent Labs  Lab 04/20/17  1711 04/21/17  0421 04/22/17  0451   WBC 16.94* 13.22* 10.44   HGB 12.7  11.0* 10.3*   HCT 38.7 34.3* 32.6*    237 194       Significant Imaging: reviewed

## 2017-04-22 NOTE — PLAN OF CARE
Problem: Physical Therapy Goal  Goal: Physical Therapy Goal  Goals to be met by: 17.     Patient will increase functional independence with mobility by performin. Supine <>sit with Stand-by Assistance.  2. Transfer bed <> chair with SBA.   3. Ambulate 50 w/RW SBA.  Outcome: Ongoing (interventions implemented as appropriate)  Pt tolerated gait training well despite c/o pain, left seated in recliner with family present.

## 2017-04-23 LAB
ALBUMIN SERPL BCP-MCNC: 2 G/DL
ALP SERPL-CCNC: 75 U/L
ALT SERPL W/O P-5'-P-CCNC: 7 U/L
ANION GAP SERPL CALC-SCNC: 6 MMOL/L
AST SERPL-CCNC: 12 U/L
BASOPHILS # BLD AUTO: 0.02 K/UL
BASOPHILS NFR BLD: 0.3 %
BILIRUB SERPL-MCNC: 0.9 MG/DL
BUN SERPL-MCNC: 25 MG/DL
CALCIUM SERPL-MCNC: 8.8 MG/DL
CHLORIDE SERPL-SCNC: 105 MMOL/L
CO2 SERPL-SCNC: 27 MMOL/L
CREAT SERPL-MCNC: 0.9 MG/DL
DIFFERENTIAL METHOD: ABNORMAL
EOSINOPHIL # BLD AUTO: 0.1 K/UL
EOSINOPHIL NFR BLD: 2 %
ERYTHROCYTE [DISTWIDTH] IN BLOOD BY AUTOMATED COUNT: 13.9 %
EST. GFR  (AFRICAN AMERICAN): >60 ML/MIN/1.73 M^2
EST. GFR  (NON AFRICAN AMERICAN): >60 ML/MIN/1.73 M^2
GLUCOSE SERPL-MCNC: 124 MG/DL
HCT VFR BLD AUTO: 30.4 %
HGB BLD-MCNC: 10 G/DL
LYMPHOCYTES # BLD AUTO: 1.5 K/UL
LYMPHOCYTES NFR BLD: 21.4 %
MCH RBC QN AUTO: 27.4 PG
MCHC RBC AUTO-ENTMCNC: 32.9 %
MCV RBC AUTO: 83 FL
MONOCYTES # BLD AUTO: 0.8 K/UL
MONOCYTES NFR BLD: 10.6 %
NEUTROPHILS # BLD AUTO: 4.7 K/UL
NEUTROPHILS NFR BLD: 65.7 %
PLATELET # BLD AUTO: 159 K/UL
PMV BLD AUTO: 10.6 FL
POCT GLUCOSE: 115 MG/DL (ref 70–110)
POCT GLUCOSE: 152 MG/DL (ref 70–110)
POCT GLUCOSE: 162 MG/DL (ref 70–110)
POCT GLUCOSE: 278 MG/DL (ref 70–110)
POTASSIUM SERPL-SCNC: 3.1 MMOL/L
PROT SERPL-MCNC: 5.3 G/DL
RBC # BLD AUTO: 3.65 M/UL
SODIUM SERPL-SCNC: 138 MMOL/L
WBC # BLD AUTO: 7.16 K/UL

## 2017-04-23 PROCEDURE — 85025 COMPLETE CBC W/AUTO DIFF WBC: CPT

## 2017-04-23 PROCEDURE — 25000003 PHARM REV CODE 250: Performed by: EMERGENCY MEDICINE

## 2017-04-23 PROCEDURE — 11000001 HC ACUTE MED/SURG PRIVATE ROOM

## 2017-04-23 PROCEDURE — 25000003 PHARM REV CODE 250: Performed by: HOSPITALIST

## 2017-04-23 PROCEDURE — 97110 THERAPEUTIC EXERCISES: CPT

## 2017-04-23 PROCEDURE — 25000003 PHARM REV CODE 250: Performed by: INTERNAL MEDICINE

## 2017-04-23 PROCEDURE — 63600175 PHARM REV CODE 636 W HCPCS: Performed by: EMERGENCY MEDICINE

## 2017-04-23 PROCEDURE — 97116 GAIT TRAINING THERAPY: CPT

## 2017-04-23 PROCEDURE — 63600175 PHARM REV CODE 636 W HCPCS: Performed by: HOSPITALIST

## 2017-04-23 PROCEDURE — 63600175 PHARM REV CODE 636 W HCPCS: Performed by: ORTHOPAEDIC SURGERY

## 2017-04-23 PROCEDURE — 36415 COLL VENOUS BLD VENIPUNCTURE: CPT

## 2017-04-23 PROCEDURE — 80053 COMPREHEN METABOLIC PANEL: CPT

## 2017-04-23 RX ADMIN — GABAPENTIN 300 MG: 300 CAPSULE ORAL at 06:04

## 2017-04-23 RX ADMIN — GABAPENTIN 300 MG: 300 CAPSULE ORAL at 02:04

## 2017-04-23 RX ADMIN — PANTOPRAZOLE SODIUM 40 MG: 40 TABLET, DELAYED RELEASE ORAL at 09:04

## 2017-04-23 RX ADMIN — INSULIN ASPART 2 UNITS: 100 INJECTION, SOLUTION INTRAVENOUS; SUBCUTANEOUS at 12:04

## 2017-04-23 RX ADMIN — PROPYLTHIOURACIL 100 MG: 50 TABLET ORAL at 09:04

## 2017-04-23 RX ADMIN — CILOSTAZOL 50 MG: 50 TABLET ORAL at 09:04

## 2017-04-23 RX ADMIN — INSULIN ASPART 6 UNITS: 100 INJECTION, SOLUTION INTRAVENOUS; SUBCUTANEOUS at 05:04

## 2017-04-23 RX ADMIN — MORPHINE SULFATE 2 MG: 10 INJECTION INTRAVENOUS at 07:04

## 2017-04-23 RX ADMIN — IBUPROFEN 400 MG: 400 TABLET, FILM COATED ORAL at 09:04

## 2017-04-23 RX ADMIN — PIPERACILLIN AND TAZOBACTAM 4.5 G: 4; .5 INJECTION, POWDER, LYOPHILIZED, FOR SOLUTION INTRAVENOUS; PARENTERAL at 12:04

## 2017-04-23 RX ADMIN — GABAPENTIN 300 MG: 300 CAPSULE ORAL at 09:04

## 2017-04-23 RX ADMIN — IBUPROFEN 400 MG: 400 TABLET, FILM COATED ORAL at 12:04

## 2017-04-23 RX ADMIN — RAMIPRIL 10 MG: 2.5 CAPSULE ORAL at 09:04

## 2017-04-23 RX ADMIN — HYDROCHLOROTHIAZIDE 25 MG: 25 TABLET ORAL at 09:04

## 2017-04-23 RX ADMIN — PIPERACILLIN AND TAZOBACTAM 4.5 G: 4; .5 INJECTION, POWDER, LYOPHILIZED, FOR SOLUTION INTRAVENOUS; PARENTERAL at 09:04

## 2017-04-23 RX ADMIN — PIPERACILLIN AND TAZOBACTAM 4.5 G: 4; .5 INJECTION, POWDER, LYOPHILIZED, FOR SOLUTION INTRAVENOUS; PARENTERAL at 02:04

## 2017-04-23 RX ADMIN — ASPIRIN 81 MG: 81 TABLET, COATED ORAL at 09:04

## 2017-04-23 RX ADMIN — ENOXAPARIN SODIUM 40 MG: 100 INJECTION SUBCUTANEOUS at 05:04

## 2017-04-23 RX ADMIN — METOPROLOL TARTRATE 50 MG: 50 TABLET ORAL at 09:04

## 2017-04-23 RX ADMIN — PIPERACILLIN AND TAZOBACTAM 4.5 G: 4; .5 INJECTION, POWDER, LYOPHILIZED, FOR SOLUTION INTRAVENOUS; PARENTERAL at 06:04

## 2017-04-23 NOTE — PROGRESS NOTES
POD#2  Patient stated she is feeling better and pain has decreased. She has a fever overnight but is improved now.  Tmax 102.9 BPmax 167/84    Right knee: Surgical dressing removed. Erythema, effusion, and warmth improved. Incisions c/d/i. ROM still limited due to pain.   Cultures: pending  WBC 7    A/P: POD#2 Right ATS/ septic joint  1) up with PT  2) cont abx per ID recs  3) Ok for D/C tomorrow from Ortho standpoint, if medically optimized

## 2017-04-23 NOTE — PLAN OF CARE
Problem: Diabetes, Type 2 (Adult)  Goal: Signs and Symptoms of Listed Potential Problems Will be Absent, Minimized or Managed (Diabetes, Type 2)  Signs and symptoms of listed potential problems will be absent, minimized or managed by discharge/transition of care (reference Diabetes, Type 2 (Adult) CPG).  Outcome: Ongoing (interventions implemented as appropriate)  Pt and two of patient's daughters had questions regarding sliding scale insulin orders.  Educated pt and family about short acting insulin.  Also provided patient teaching about foods that can increase blood sugar and the importance of monitoring blood glucose levels at home. Pt and family verbalized understanding.

## 2017-04-23 NOTE — PLAN OF CARE
Problem: Physical Therapy Goal  Goal: Physical Therapy Goal  Goals to be met by: 17.     Patient will increase functional independence with mobility by performin. Supine <>sit with Stand-by Assistance.  2. Transfer bed <> chair with SBA.   3. Ambulate 50 w/RW SBA.   Outcome: Ongoing (interventions implemented as appropriate)  Pt making gains towards goals.

## 2017-04-23 NOTE — SUBJECTIVE & OBJECTIVE
Past Medical History:   Diagnosis Date    AMD (age-related macular degeneration), bilateral 6/10/2016    Cataract     Coronary artery disease     Diabetes mellitus, type 2     Hyperlipidemia     Hypertension     Hyperthyroidism 8/20/2015    Osteoarthritis     Peripheral arterial disease 4/3/2013       Past Surgical History:   Procedure Laterality Date    ABDOMINAL SURGERY      APPENDECTOMY      CARDIAC SURGERY  2008    CABG    CATARACT EXTRACTION Right     CATARACT EXTRACTION W/  INTRAOCULAR LENS IMPLANT  8/13/2015    Dr. Blount ( [facility])    EYE SURGERY  2011    Rt eye cataract    HERNIA REPAIR      HYSTERECTOMY      HYSTEROTOMY N/A 33 yrs old       Review of patient's allergies indicates:   Allergen Reactions    Pravastatin Other (See Comments)     Muscle pain       No current facility-administered medications on file prior to encounter.      Current Outpatient Prescriptions on File Prior to Encounter   Medication Sig    aspirin 81 MG Chew Take 81 mg by mouth once daily.    cetirizine (ZYRTEC) 10 MG tablet TAKE 1 TABLET (10 MG TOTAL) BY MOUTH DAILY AS NEEDED FOR ALLERGIES OR RHINITIS.    dexamethasone (DECADRON) 0.75 MG Tab Take 1 tablet (0.75 mg total) by mouth 2 (two) times daily.    gabapentin (NEURONTIN) 300 MG capsule Take 1 capsule (300 mg total) by mouth every evening.    glipiZIDE (GLUCOTROL) 10 MG tablet TAKE TWO PILLS BY MOUTH EVERY MORNING AND ONE PILL BY MOUTH EVERY EVENING.    hydrochlorothiazide (HYDRODIURIL) 25 MG tablet TAKE 1 TABLET (25 MG TOTAL) BY MOUTH ONCE DAILY.    hydrOXYzine (ATARAX) 25 MG tablet TAKE ONE OR TWO TABLETS EVERY 4-6 HOURS AS NEEDED FOR ITCHING. THIS MAY MAKE YOU MORE PRONE TO FALLING.    metformin (GLUCOPHAGE) 500 MG tablet Take 1 tablet (500 mg total) by mouth 2 (two) times daily with meals.    metoprolol tartrate (LOPRESSOR) 50 MG tablet Take 1 tablet (50 mg total) by mouth once daily.    ramipril (ALTACE) 5 MG capsule Take 1 capsule (5 mg total)  by mouth once daily.    ACCU-CHEK SMARTVIEW TEST STRIP Strp     ALCOHOL PREP PADS PadM     azithromycin (Z-MARYCHUY) 250 MG tablet TAKE 2 TABLETS BY MOUTH TODAY, THEN TAKE 1 TABLET DAILY FOR 4 DAYS    cilostazol (PLETAL) 50 MG Tab Take 1 tablet (50 mg total) by mouth once daily.    diclofenac (VOLTAREN) 50 MG EC tablet Take 1 tablet (50 mg total) by mouth 3 (three) times daily as needed.    fluticasone (FLONASE) 50 mcg/actuation nasal spray USE 1 SPRAY BY EACH NARE ROUTE ONCE DAILY.    ibuprofen (ADVIL,MOTRIN) 200 MG tablet Take 2 tablets (400 mg total) by mouth every 6 (six) hours as needed for Pain.    lancets 30 gauge Misc     lancing device Misc     lidocaine-prilocaine (EMLA) cream Apply topically as needed. To left shoulder    methylPREDNISolone (MEDROL DOSEPACK) 4 mg tablet use as directed    nitroGLYCERIN (NITROSTAT) 0.4 MG SL tablet Place 1 tablet (0.4 mg total) under the tongue every 5 (five) minutes as needed for Chest pain. 1 Tablet, Sublingual Sublingual as needed .  as directed    propylthiouracil (PTU) 50 mg Tab TAKE 2 TABLETS BY MOUTH EVERY 8 HOURS     Family History     Problem Relation (Age of Onset)    Cataracts Father, Sister    Coronary artery disease Father    Diabetes Mother    Heart disease Brother    No Known Problems Maternal Aunt, Maternal Uncle, Paternal Aunt, Paternal Uncle, Maternal Grandmother, Maternal Grandfather, Paternal Grandmother, Paternal Grandfather        Social History Main Topics    Smoking status: Current Every Day Smoker     Packs/day: 1.00    Smokeless tobacco: Not on file    Alcohol use No    Drug use: No    Sexual activity: Not on file     Review of Systems   Constitutional: Negative for activity change and appetite change.   HENT: Negative for congestion and dental problem.    Eyes: Negative for discharge and itching.   Respiratory: Negative for apnea and chest tightness.    Cardiovascular: Negative for chest pain.   Gastrointestinal: Negative for  abdominal distention and abdominal pain.   Endocrine: Negative for cold intolerance.   Genitourinary: Negative for dyspareunia.   Musculoskeletal: Positive for joint swelling.        Right knee   Skin: Negative for color change and pallor.   Allergic/Immunologic: Negative for environmental allergies and food allergies.   Neurological: Negative for dizziness and facial asymmetry.   Hematological: Negative for adenopathy. Does not bruise/bleed easily.   Psychiatric/Behavioral: Negative for agitation and behavioral problems.     Objective:     Vital Signs (Most Recent):  Temp: 98.2 °F (36.8 °C) (04/23/17 0037)  Pulse: 92 (04/23/17 0037)  Resp: 20 (04/23/17 0037)  BP: (!) 160/84 (04/23/17 0037)  SpO2: 96 % (04/23/17 0037) Vital Signs (24h Range):  Temp:  [98.2 °F (36.8 °C)-102.9 °F (39.4 °C)] 98.2 °F (36.8 °C)  Pulse:  [] 92  Resp:  [17-20] 20  SpO2:  [90 %-96 %] 96 %  BP: (146-167)/(55-84) 160/84     Weight: 54 kg (119 lb)  Body mass index is 21.77 kg/(m^2).    Physical Exam   Constitutional: She is oriented to person, place, and time. No distress.   HENT:   Head: Atraumatic.   Eyes: EOM are normal. Pupils are equal, round, and reactive to light.   Neck: Normal range of motion. Neck supple.   Cardiovascular: Normal rate and regular rhythm.    Pulmonary/Chest: Effort normal and breath sounds normal.   Abdominal: Soft.   Musculoskeletal: Normal range of motion. She exhibits tenderness.   Swollen and tender right knee   Neurological: She is oriented to person, place, and time. No cranial nerve deficit. Coordination normal.   Skin: Skin is warm and dry. She is not diaphoretic.   Psychiatric: She has a normal mood and affect. Her behavior is normal.        Significant Labs:   BMP:     Recent Labs  Lab 04/22/17 0451   *      K 3.8      CO2 27   BUN 17   CREATININE 0.7   CALCIUM 9.0     CBC:     Recent Labs  Lab 04/22/17 0451 04/23/17  0545   WBC 10.44 7.16   HGB 10.3* 10.0*   HCT 32.6* 30.4*   PLT  194 159       Significant Imaging: reviewed

## 2017-04-23 NOTE — PLAN OF CARE
Problem: Fall Risk (Adult)  Goal: Identify Related Risk Factors and Signs and Symptoms  Related risk factors and signs and symptoms are identified upon initiation of Human Response Clinical Practice Guideline (CPG)   Outcome: Ongoing (interventions implemented as appropriate)  Pt remains free from fall or injury.  Pt compliant with call light and family member at bedside.      Problem: Pressure Ulcer Risk (Bigg Scale) (Adult,Obstetrics,Pediatric)  Goal: Identify Related Risk Factors and Signs and Symptoms  Related risk factors and signs and symptoms are identified upon initiation of Human Response Clinical Practice Guideline (CPG)   Outcome: Ongoing (interventions implemented as appropriate)  Pt able to turn and shift weight independently while in bed.  Pt educated about importance of turning at least every two hours to avoid skin breakdown. Pt and pt's daughter verbalized understanding.      Problem: Pain, Acute (Adult)  Goal: Acceptable Pain Control/Comfort Level  Patient will demonstrate the desired outcomes by discharge/transition of care.   Outcome: Ongoing (interventions implemented as appropriate)  Pain controlled this shift with motrin.

## 2017-04-23 NOTE — PROGRESS NOTES
"[facility]  [place] Note    Patient Name: Khloe Kendrick  MRN: [id number]  Patient Class: IP- Inpatient   Admission Date: 4/20/2017  Length of Stay: 3 days  Attending Physician: Alisson Tanner MD  Primary Care Provider: Aimee Marquis MD        Subjective:     Principal Problem:Pyogenic arthritis of right knee joint    HPI:  75 y/o female with AMD, CAD, DM type II, HTN, hyperthyroidism, osteoarthritis, and peripheral arterial disease presents to the ED c/o acute onset atraumatic R sided knee pain that radiates down back of R sided calf that started yesterday, Pain is severe (10/10). Movement and palpation exacerbates the pain. Pt reports similar symptoms last yr on her L leg; however, pt states that her doctor wasn't able to figure out the problem. Pt reports sleeping with her legs "twisted." Pt also reports itchiness to her bilateral arms and legs. Pt denies hx of blood clots. Pt denies fever, chills, or cough. No attempted treatment reported. No alleviating factors or other symptoms reported.patient has negative DVT study,X ray show no sign of fracture,she has has leucocytosis with elevated ESR and CRP,she had joint aspiration in ER ,which show many WBC,her right knee is swollen and very tender,no crystals has been seen,no major growth at this time,consulted Ortho and ID for further evaluation,keep patient NPO at this time.    Hospital Course:  75 y/o female with AMD, CAD, DM type II, HTN, hyperthyroidism, osteoarthritis, and peripheral arterial disease presents to the ED c/o acute onset atraumatic R sided knee pain that radiates down back of R sided calf that started yesterday, Pain is severe (10/10). Movement and palpation exacerbates the pain. Pt reports similar symptoms last yr on her L leg; however, pt states that her doctor wasn't able to figure out the problem. Pt reports sleeping with her legs "twisted." Pt also reports itchiness to her bilateral arms and " legs. Pt denies hx of blood clots. Pt denies fever, chills, or cough. No attempted treatment reported. No alleviating factors or other symptoms reported.patient has negative DVT study,X ray show no sign of fracture,she has has leucocytosis with elevated ESR and CRP,she had joint aspiration in ER ,which show many WBC,her right knee is swollen and very tender,no crystals has been seen,no major growth at this time,consulted Ortho and ID for further evaluation,S/P washout by ortho.follow cultures.still has fever.    Past Medical History:   Diagnosis Date    AMD (age-related macular degeneration), bilateral 6/10/2016    Cataract     Coronary artery disease     Diabetes mellitus, type 2     Hyperlipidemia     Hypertension     Hyperthyroidism 8/20/2015    Osteoarthritis     Peripheral arterial disease 4/3/2013       Past Surgical History:   Procedure Laterality Date    ABDOMINAL SURGERY      APPENDECTOMY      CARDIAC SURGERY  2008    CABG    CATARACT EXTRACTION Right     CATARACT EXTRACTION W/  INTRAOCULAR LENS IMPLANT  8/13/2015    Dr. Blount ( [facility])    EYE SURGERY  2011    Rt eye cataract    HERNIA REPAIR      HYSTERECTOMY      HYSTEROTOMY N/A 33 yrs old       Review of patient's allergies indicates:   Allergen Reactions    Pravastatin Other (See Comments)     Muscle pain       No current facility-administered medications on file prior to encounter.      Current Outpatient Prescriptions on File Prior to Encounter   Medication Sig    aspirin 81 MG Chew Take 81 mg by mouth once daily.    cetirizine (ZYRTEC) 10 MG tablet TAKE 1 TABLET (10 MG TOTAL) BY MOUTH DAILY AS NEEDED FOR ALLERGIES OR RHINITIS.    dexamethasone (DECADRON) 0.75 MG Tab Take 1 tablet (0.75 mg total) by mouth 2 (two) times daily.    gabapentin (NEURONTIN) 300 MG capsule Take 1 capsule (300 mg total) by mouth every evening.    glipiZIDE (GLUCOTROL) 10 MG tablet TAKE TWO PILLS BY MOUTH EVERY MORNING AND ONE PILL BY MOUTH EVERY  EVENING.    hydrochlorothiazide (HYDRODIURIL) 25 MG tablet TAKE 1 TABLET (25 MG TOTAL) BY MOUTH ONCE DAILY.    hydrOXYzine (ATARAX) 25 MG tablet TAKE ONE OR TWO TABLETS EVERY 4-6 HOURS AS NEEDED FOR ITCHING. THIS MAY MAKE YOU MORE PRONE TO FALLING.    metformin (GLUCOPHAGE) 500 MG tablet Take 1 tablet (500 mg total) by mouth 2 (two) times daily with meals.    metoprolol tartrate (LOPRESSOR) 50 MG tablet Take 1 tablet (50 mg total) by mouth once daily.    ramipril (ALTACE) 5 MG capsule Take 1 capsule (5 mg total) by mouth once daily.    ACCU-CHEK SMARTVIEW TEST STRIP Strp     ALCOHOL PREP PADS PadM     azithromycin (Z-MARYCHUY) 250 MG tablet TAKE 2 TABLETS BY MOUTH TODAY, THEN TAKE 1 TABLET DAILY FOR 4 DAYS    cilostazol (PLETAL) 50 MG Tab Take 1 tablet (50 mg total) by mouth once daily.    diclofenac (VOLTAREN) 50 MG EC tablet Take 1 tablet (50 mg total) by mouth 3 (three) times daily as needed.    fluticasone (FLONASE) 50 mcg/actuation nasal spray USE 1 SPRAY BY EACH NARE ROUTE ONCE DAILY.    ibuprofen (ADVIL,MOTRIN) 200 MG tablet Take 2 tablets (400 mg total) by mouth every 6 (six) hours as needed for Pain.    lancets 30 gauge Misc     lancing device Misc     lidocaine-prilocaine (EMLA) cream Apply topically as needed. To left shoulder    methylPREDNISolone (MEDROL DOSEPACK) 4 mg tablet use as directed    nitroGLYCERIN (NITROSTAT) 0.4 MG SL tablet Place 1 tablet (0.4 mg total) under the tongue every 5 (five) minutes as needed for Chest pain. 1 Tablet, Sublingual Sublingual as needed .  as directed    propylthiouracil (PTU) 50 mg Tab TAKE 2 TABLETS BY MOUTH EVERY 8 HOURS     Family History     Problem Relation (Age of Onset)    Cataracts Father, Sister    Coronary artery disease Father    Diabetes Mother    Heart disease Brother    No Known Problems Maternal Aunt, Maternal Uncle, Paternal Aunt, Paternal Uncle, Maternal Grandmother, Maternal Grandfather, Paternal Grandmother, Paternal Grandfather         Social History Main Topics    Smoking status: Current Every Day Smoker     Packs/day: 1.00    Smokeless tobacco: Not on file    Alcohol use No    Drug use: No    Sexual activity: Not on file     Review of Systems   Constitutional: Negative for activity change and appetite change.   HENT: Negative for congestion and dental problem.    Eyes: Negative for discharge and itching.   Respiratory: Negative for apnea and chest tightness.    Cardiovascular: Negative for chest pain.   Gastrointestinal: Negative for abdominal distention and abdominal pain.   Endocrine: Negative for cold intolerance.   Genitourinary: Negative for dyspareunia.   Musculoskeletal: Positive for joint swelling.        Right knee   Skin: Negative for color change and pallor.   Allergic/Immunologic: Negative for environmental allergies and food allergies.   Neurological: Negative for dizziness and facial asymmetry.   Hematological: Negative for adenopathy. Does not bruise/bleed easily.   Psychiatric/Behavioral: Negative for agitation and behavioral problems.     Objective:     Vital Signs (Most Recent):  Temp: 98.2 °F (36.8 °C) (04/23/17 0037)  Pulse: 92 (04/23/17 0037)  Resp: 20 (04/23/17 0037)  BP: (!) 160/84 (04/23/17 0037)  SpO2: 96 % (04/23/17 0037) Vital Signs (24h Range):  Temp:  [98.2 °F (36.8 °C)-102.9 °F (39.4 °C)] 98.2 °F (36.8 °C)  Pulse:  [] 92  Resp:  [17-20] 20  SpO2:  [90 %-96 %] 96 %  BP: (146-167)/(55-84) 160/84     Weight: 54 kg (119 lb)  Body mass index is 21.77 kg/(m^2).    Physical Exam   Constitutional: She is oriented to person, place, and time. No distress.   HENT:   Head: Atraumatic.   Eyes: EOM are normal. Pupils are equal, round, and reactive to light.   Neck: Normal range of motion. Neck supple.   Cardiovascular: Normal rate and regular rhythm.    Pulmonary/Chest: Effort normal and breath sounds normal.   Abdominal: Soft.   Musculoskeletal: Normal range of motion. She exhibits tenderness.   Swollen and tender  right knee   Neurological: She is oriented to person, place, and time. No cranial nerve deficit. Coordination normal.   Skin: Skin is warm and dry. She is not diaphoretic.   Psychiatric: She has a normal mood and affect. Her behavior is normal.        Significant Labs:   BMP:     Recent Labs  Lab 04/22/17  0451   *      K 3.8      CO2 27   BUN 17   CREATININE 0.7   CALCIUM 9.0     CBC:     Recent Labs  Lab 04/22/17 0451 04/23/17  0545   WBC 10.44 7.16   HGB 10.3* 10.0*   HCT 32.6* 30.4*    159       Significant Imaging: reviewed    Assessment/Plan:      * Pyogenic arthritis of right knee joint  Will continue with broad spectrum IV Abx,follow cultures,S/P washout by Ortho,follow cultures.ID is following.      Diabetes mellitus type 2, controlled  on SSI.      Essential hypertension  Continue with home medication,use prn IV Hydralazine.      Combined hyperlipidemia associated with type 2 diabetes mellitus  On statin.      Osteoarthritis  More on the knees,pain management,PT,OT      Peripheral arterial disease  On pletal.ASA      Hyperthyroidism  On PTU,checked TSH.free T 4,not controlled,increased PTU,will arrange follow up with her endo. Before discharge.      Sepsis  With fever,leucocytosis,tachycardia,duo to right septic joint,on IV Abx.      VTE Risk Mitigation         Ordered     enoxaparin injection 40 mg  Daily     Route:  Subcutaneous        04/22/17 0903     Medium Risk of VTE  Once      04/21/17 0105     Place sequential compression device  Until discontinued      04/21/17 0105          Alisson Tanner MD  Department of Hospital Medicine   [facility]

## 2017-04-23 NOTE — ASSESSMENT & PLAN NOTE
Will continue with broad spectrum IV Abx,follow cultures,S/P washout by Ortho,follow cultures.ID is following.

## 2017-04-24 VITALS
HEIGHT: 62 IN | WEIGHT: 119 LBS | DIASTOLIC BLOOD PRESSURE: 77 MMHG | BODY MASS INDEX: 21.9 KG/M2 | HEART RATE: 98 BPM | OXYGEN SATURATION: 96 % | RESPIRATION RATE: 18 BRPM | TEMPERATURE: 98 F | SYSTOLIC BLOOD PRESSURE: 112 MMHG

## 2017-04-24 LAB
ALBUMIN SERPL BCP-MCNC: 1.9 G/DL
ALP SERPL-CCNC: 66 U/L
ALT SERPL W/O P-5'-P-CCNC: 8 U/L
ANION GAP SERPL CALC-SCNC: 7 MMOL/L
AST SERPL-CCNC: 13 U/L
BACTERIA FLD AEROBE CULT: NO GROWTH
BASOPHILS # BLD AUTO: 0.02 K/UL
BASOPHILS NFR BLD: 0.4 %
BILIRUB SERPL-MCNC: 0.5 MG/DL
BUN SERPL-MCNC: 31 MG/DL
CALCIUM SERPL-MCNC: 8.8 MG/DL
CHLORIDE SERPL-SCNC: 107 MMOL/L
CO2 SERPL-SCNC: 26 MMOL/L
CREAT SERPL-MCNC: 0.9 MG/DL
DIFFERENTIAL METHOD: ABNORMAL
EOSINOPHIL # BLD AUTO: 0.2 K/UL
EOSINOPHIL NFR BLD: 3.3 %
ERYTHROCYTE [DISTWIDTH] IN BLOOD BY AUTOMATED COUNT: 13.9 %
EST. GFR  (AFRICAN AMERICAN): >60 ML/MIN/1.73 M^2
EST. GFR  (NON AFRICAN AMERICAN): >60 ML/MIN/1.73 M^2
GLUCOSE SERPL-MCNC: 144 MG/DL
GRAM STN SPEC: NORMAL
HCT VFR BLD AUTO: 28 %
HGB BLD-MCNC: 9.1 G/DL
LYMPHOCYTES # BLD AUTO: 1.5 K/UL
LYMPHOCYTES NFR BLD: 27.9 %
MCH RBC QN AUTO: 26.8 PG
MCHC RBC AUTO-ENTMCNC: 32.5 %
MCV RBC AUTO: 83 FL
MONOCYTES # BLD AUTO: 0.6 K/UL
MONOCYTES NFR BLD: 11.3 %
NEUTROPHILS # BLD AUTO: 3.1 K/UL
NEUTROPHILS NFR BLD: 56.9 %
PLATELET # BLD AUTO: 202 K/UL
PMV BLD AUTO: 11.2 FL
POCT GLUCOSE: 116 MG/DL (ref 70–110)
POCT GLUCOSE: 162 MG/DL (ref 70–110)
POCT GLUCOSE: 280 MG/DL (ref 70–110)
POTASSIUM SERPL-SCNC: 3.5 MMOL/L
PROT SERPL-MCNC: 5.3 G/DL
RBC # BLD AUTO: 3.39 M/UL
SODIUM SERPL-SCNC: 140 MMOL/L
VANCOMYCIN TROUGH SERPL-MCNC: 7.2 UG/ML
WBC # BLD AUTO: 5.49 K/UL

## 2017-04-24 PROCEDURE — 63600175 PHARM REV CODE 636 W HCPCS: Performed by: INTERNAL MEDICINE

## 2017-04-24 PROCEDURE — 25000003 PHARM REV CODE 250: Performed by: HOSPITALIST

## 2017-04-24 PROCEDURE — 63600175 PHARM REV CODE 636 W HCPCS: Performed by: ORTHOPAEDIC SURGERY

## 2017-04-24 PROCEDURE — 36415 COLL VENOUS BLD VENIPUNCTURE: CPT

## 2017-04-24 PROCEDURE — G8979 MOBILITY GOAL STATUS: HCPCS | Mod: CI

## 2017-04-24 PROCEDURE — 80202 ASSAY OF VANCOMYCIN: CPT

## 2017-04-24 PROCEDURE — G8978 MOBILITY CURRENT STATUS: HCPCS | Mod: CJ

## 2017-04-24 PROCEDURE — 25000003 PHARM REV CODE 250: Performed by: INTERNAL MEDICINE

## 2017-04-24 PROCEDURE — 02HV33Z INSERTION OF INFUSION DEVICE INTO SUPERIOR VENA CAVA, PERCUTANEOUS APPROACH: ICD-10-PCS | Performed by: HOSPITALIST

## 2017-04-24 PROCEDURE — 97116 GAIT TRAINING THERAPY: CPT

## 2017-04-24 PROCEDURE — 63600175 PHARM REV CODE 636 W HCPCS: Performed by: HOSPITALIST

## 2017-04-24 PROCEDURE — 25000003 PHARM REV CODE 250: Performed by: EMERGENCY MEDICINE

## 2017-04-24 PROCEDURE — 85025 COMPLETE CBC W/AUTO DIFF WBC: CPT

## 2017-04-24 PROCEDURE — 80053 COMPREHEN METABOLIC PANEL: CPT

## 2017-04-24 PROCEDURE — G8980 MOBILITY D/C STATUS: HCPCS | Mod: CJ

## 2017-04-24 RX ORDER — SODIUM CHLORIDE 0.9 % (FLUSH) 0.9 %
10 SYRINGE (ML) INJECTION EVERY 6 HOURS
Status: DISCONTINUED | OUTPATIENT
Start: 2017-04-24 | End: 2017-04-24 | Stop reason: HOSPADM

## 2017-04-24 RX ORDER — PROPYLTHIOURACIL 50 MG/1
100 TABLET ORAL EVERY 12 HOURS
Qty: 60 TABLET | Refills: 0 | Status: SHIPPED | OUTPATIENT
Start: 2017-04-24 | End: 2017-06-29 | Stop reason: SDUPTHER

## 2017-04-24 RX ORDER — SODIUM CHLORIDE 0.9 % (FLUSH) 0.9 %
10 SYRINGE (ML) INJECTION
Status: DISCONTINUED | OUTPATIENT
Start: 2017-04-24 | End: 2017-04-24 | Stop reason: HOSPADM

## 2017-04-24 RX ORDER — CIPROFLOXACIN 500 MG/1
500 TABLET ORAL EVERY 12 HOURS
Qty: 56 TABLET | Refills: 0 | Status: SHIPPED | OUTPATIENT
Start: 2017-04-24 | End: 2017-05-22

## 2017-04-24 RX ORDER — CIPROFLOXACIN 500 MG/1
500 TABLET ORAL EVERY 12 HOURS
Status: DISCONTINUED | OUTPATIENT
Start: 2017-04-24 | End: 2017-04-24 | Stop reason: HOSPADM

## 2017-04-24 RX ORDER — RAMIPRIL 2.5 MG/1
5 CAPSULE ORAL DAILY
Status: DISCONTINUED | OUTPATIENT
Start: 2017-04-24 | End: 2017-04-24 | Stop reason: HOSPADM

## 2017-04-24 RX ADMIN — PANTOPRAZOLE SODIUM 40 MG: 40 TABLET, DELAYED RELEASE ORAL at 08:04

## 2017-04-24 RX ADMIN — INSULIN ASPART 6 UNITS: 100 INJECTION, SOLUTION INTRAVENOUS; SUBCUTANEOUS at 12:04

## 2017-04-24 RX ADMIN — ASPIRIN 81 MG: 81 TABLET, COATED ORAL at 08:04

## 2017-04-24 RX ADMIN — ENOXAPARIN SODIUM 40 MG: 100 INJECTION SUBCUTANEOUS at 04:04

## 2017-04-24 RX ADMIN — GABAPENTIN 300 MG: 300 CAPSULE ORAL at 05:04

## 2017-04-24 RX ADMIN — SODIUM CHLORIDE, PRESERVATIVE FREE 10 ML: 5 INJECTION INTRAVENOUS at 12:04

## 2017-04-24 RX ADMIN — PROPYLTHIOURACIL 100 MG: 50 TABLET ORAL at 08:04

## 2017-04-24 RX ADMIN — CILOSTAZOL 50 MG: 50 TABLET ORAL at 08:04

## 2017-04-24 RX ADMIN — GABAPENTIN 300 MG: 300 CAPSULE ORAL at 01:04

## 2017-04-24 RX ADMIN — CIPROFLOXACIN HYDROCHLORIDE 500 MG: 500 TABLET, FILM COATED ORAL at 12:04

## 2017-04-24 RX ADMIN — METOPROLOL TARTRATE 50 MG: 50 TABLET ORAL at 08:04

## 2017-04-24 RX ADMIN — PIPERACILLIN AND TAZOBACTAM 4.5 G: 4; .5 INJECTION, POWDER, LYOPHILIZED, FOR SOLUTION INTRAVENOUS; PARENTERAL at 05:04

## 2017-04-24 RX ADMIN — RAMIPRIL 5 MG: 2.5 CAPSULE ORAL at 08:04

## 2017-04-24 RX ADMIN — VANCOMYCIN HYDROCHLORIDE 1250 MG: 1 INJECTION, POWDER, LYOPHILIZED, FOR SOLUTION INTRAVENOUS at 02:04

## 2017-04-24 RX ADMIN — MORPHINE SULFATE 2 MG: 10 INJECTION INTRAVENOUS at 02:04

## 2017-04-24 NOTE — PROGRESS NOTES
"[facility]  [place] Note    Patient Name: Khloe Kendrick  MRN: [id number]  Patient Class: IP- Inpatient   Admission Date: 4/20/2017  Length of Stay: 4 days  Attending Physician: Alisson Tanner MD  Primary Care Provider: Aimee Marquis MD        Subjective:     Principal Problem:Pyogenic arthritis of right knee joint    HPI:  75 y/o female with AMD, CAD, DM type II, HTN, hyperthyroidism, osteoarthritis, and peripheral arterial disease presents to the ED c/o acute onset atraumatic R sided knee pain that radiates down back of R sided calf that started yesterday, Pain is severe (10/10). Movement and palpation exacerbates the pain. Pt reports similar symptoms last yr on her L leg; however, pt states that her doctor wasn't able to figure out the problem. Pt reports sleeping with her legs "twisted." Pt also reports itchiness to her bilateral arms and legs. Pt denies hx of blood clots. Pt denies fever, chills, or cough. No attempted treatment reported. No alleviating factors or other symptoms reported.patient has negative DVT study,X ray show no sign of fracture,she has has leucocytosis with elevated ESR and CRP,she had joint aspiration in ER ,which show many WBC,her right knee is swollen and very tender,no crystals has been seen,no major growth at this time,consulted Ortho and ID for further evaluation,keep patient NPO at this time.    Hospital Course:  75 y/o female with AMD, CAD, DM type II, HTN, hyperthyroidism, osteoarthritis, and peripheral arterial disease presents to the ED c/o acute onset atraumatic R sided knee pain that radiates down back of R sided calf that started yesterday, Pain is severe (10/10). Movement and palpation exacerbates the pain. Pt reports similar symptoms last yr on her L leg; however, pt states that her doctor wasn't able to figure out the problem. Pt reports sleeping with her legs "twisted." Pt also reports itchiness to her bilateral arms and " legs. Pt denies hx of blood clots. Pt denies fever, chills, or cough. No attempted treatment reported. No alleviating factors or other symptoms reported.patient has negative DVT study,X ray show no sign of fracture,she has has leucocytosis with elevated ESR and CRP,she had joint aspiration in ER ,which show many WBC,her right knee is swollen and very tender,no crystals has been seen,no major growth at this time,consulted Ortho and ID for further evaluation,S/P washout by ortho.follow cultures.fever resolved,no growth in cultures..    Past Medical History:   Diagnosis Date    AMD (age-related macular degeneration), bilateral 6/10/2016    Cataract     Coronary artery disease     Diabetes mellitus, type 2     Hyperlipidemia     Hypertension     Hyperthyroidism 8/20/2015    Osteoarthritis     Peripheral arterial disease 4/3/2013       Past Surgical History:   Procedure Laterality Date    ABDOMINAL SURGERY      APPENDECTOMY      CARDIAC SURGERY  2008    CABG    CATARACT EXTRACTION Right     CATARACT EXTRACTION W/  INTRAOCULAR LENS IMPLANT  8/13/2015    Dr. Blount ( [facility])    EYE SURGERY  2011    Rt eye cataract    HERNIA REPAIR      HYSTERECTOMY      HYSTEROTOMY N/A 33 yrs old       Review of patient's allergies indicates:   Allergen Reactions    Pravastatin Other (See Comments)     Muscle pain       No current facility-administered medications on file prior to encounter.      Current Outpatient Prescriptions on File Prior to Encounter   Medication Sig    aspirin 81 MG Chew Take 81 mg by mouth once daily.    cetirizine (ZYRTEC) 10 MG tablet TAKE 1 TABLET (10 MG TOTAL) BY MOUTH DAILY AS NEEDED FOR ALLERGIES OR RHINITIS.    dexamethasone (DECADRON) 0.75 MG Tab Take 1 tablet (0.75 mg total) by mouth 2 (two) times daily.    gabapentin (NEURONTIN) 300 MG capsule Take 1 capsule (300 mg total) by mouth every evening.    glipiZIDE (GLUCOTROL) 10 MG tablet TAKE TWO PILLS BY MOUTH EVERY MORNING AND ONE  PILL BY MOUTH EVERY EVENING.    hydrochlorothiazide (HYDRODIURIL) 25 MG tablet TAKE 1 TABLET (25 MG TOTAL) BY MOUTH ONCE DAILY.    hydrOXYzine (ATARAX) 25 MG tablet TAKE ONE OR TWO TABLETS EVERY 4-6 HOURS AS NEEDED FOR ITCHING. THIS MAY MAKE YOU MORE PRONE TO FALLING.    metformin (GLUCOPHAGE) 500 MG tablet Take 1 tablet (500 mg total) by mouth 2 (two) times daily with meals.    metoprolol tartrate (LOPRESSOR) 50 MG tablet Take 1 tablet (50 mg total) by mouth once daily.    ramipril (ALTACE) 5 MG capsule Take 1 capsule (5 mg total) by mouth once daily.    ACCU-CHEK SMARTVIEW TEST STRIP Strp     ALCOHOL PREP PADS PadM     azithromycin (Z-MARYCHUY) 250 MG tablet TAKE 2 TABLETS BY MOUTH TODAY, THEN TAKE 1 TABLET DAILY FOR 4 DAYS    cilostazol (PLETAL) 50 MG Tab Take 1 tablet (50 mg total) by mouth once daily.    diclofenac (VOLTAREN) 50 MG EC tablet Take 1 tablet (50 mg total) by mouth 3 (three) times daily as needed.    fluticasone (FLONASE) 50 mcg/actuation nasal spray USE 1 SPRAY BY EACH NARE ROUTE ONCE DAILY.    ibuprofen (ADVIL,MOTRIN) 200 MG tablet Take 2 tablets (400 mg total) by mouth every 6 (six) hours as needed for Pain.    lancets 30 gauge Misc     lancing device Misc     lidocaine-prilocaine (EMLA) cream Apply topically as needed. To left shoulder    methylPREDNISolone (MEDROL DOSEPACK) 4 mg tablet use as directed    nitroGLYCERIN (NITROSTAT) 0.4 MG SL tablet Place 1 tablet (0.4 mg total) under the tongue every 5 (five) minutes as needed for Chest pain. 1 Tablet, Sublingual Sublingual as needed .  as directed    propylthiouracil (PTU) 50 mg Tab TAKE 2 TABLETS BY MOUTH EVERY 8 HOURS     Family History     Problem Relation (Age of Onset)    Cataracts Father, Sister    Coronary artery disease Father    Diabetes Mother    Heart disease Brother    No Known Problems Maternal Aunt, Maternal Uncle, Paternal Aunt, Paternal Uncle, Maternal Grandmother, Maternal Grandfather, Paternal Grandmother,  Paternal Grandfather        Social History Main Topics    Smoking status: Current Every Day Smoker     Packs/day: 1.00    Smokeless tobacco: Not on file    Alcohol use No    Drug use: No    Sexual activity: Not on file     Review of Systems   Constitutional: Negative for activity change and appetite change.   HENT: Negative for congestion and dental problem.    Eyes: Negative for discharge and itching.   Respiratory: Negative for apnea and chest tightness.    Cardiovascular: Negative for chest pain.   Gastrointestinal: Negative for abdominal distention and abdominal pain.   Endocrine: Negative for cold intolerance.   Genitourinary: Negative for dyspareunia.   Musculoskeletal: Positive for joint swelling.        Right knee   Skin: Negative for color change and pallor.   Allergic/Immunologic: Negative for environmental allergies and food allergies.   Neurological: Negative for dizziness and facial asymmetry.   Hematological: Negative for adenopathy. Does not bruise/bleed easily.   Psychiatric/Behavioral: Negative for agitation and behavioral problems.     Objective:     Vital Signs (Most Recent):  Temp: 97.8 °F (36.6 °C) (04/24/17 0758)  Pulse: 83 (04/24/17 0758)  Resp: 17 (04/24/17 0758)  BP: (!) 121/57 (04/24/17 0758)  SpO2: 95 % (04/24/17 0758) Vital Signs (24h Range):  Temp:  [97.6 °F (36.4 °C)-99.9 °F (37.7 °C)] 97.8 °F (36.6 °C)  Pulse:  [69-89] 83  Resp:  [17-18] 17  SpO2:  [95 %-97 %] 95 %  BP: ()/(54-79) 121/57     Weight: 54 kg (119 lb)  Body mass index is 21.77 kg/(m^2).    Physical Exam   Constitutional: She is oriented to person, place, and time. No distress.   HENT:   Head: Atraumatic.   Eyes: EOM are normal. Pupils are equal, round, and reactive to light.   Neck: Normal range of motion. Neck supple.   Cardiovascular: Normal rate and regular rhythm.    Pulmonary/Chest: Effort normal and breath sounds normal.   Abdominal: Soft.   Musculoskeletal: Normal range of motion. She exhibits tenderness.    Swollen and tender right knee   Neurological: She is oriented to person, place, and time. No cranial nerve deficit. Coordination normal.   Skin: Skin is warm and dry. She is not diaphoretic.   Psychiatric: She has a normal mood and affect. Her behavior is normal.        Significant Labs:   BMP:     Recent Labs  Lab 04/24/17 0418   *      K 3.5      CO2 26   BUN 31*   CREATININE 0.9   CALCIUM 8.8     CBC:     Recent Labs  Lab 04/23/17  0545 04/24/17 0418   WBC 7.16 5.49   HGB 10.0* 9.1*   HCT 30.4* 28.0*    202       Significant Imaging: reviewed    Assessment/Plan:      * Pyogenic arthritis of right knee joint  Will continue with broad spectrum IV Abx,follow cultures,S/P washout by Ortho,follow cultures.ID is following.no growth in cultures.      Diabetes mellitus type 2, controlled  on SSI.      Essential hypertension  Continue with home medication,use prn IV Hydralazine.      Combined hyperlipidemia associated with type 2 diabetes mellitus  On statin.      Osteoarthritis  More on the knees,pain management,PT,OT      Peripheral arterial disease  On pletal.ASA      Hyperthyroidism  On PTU,checked TSH.free T 4,not controlled,increased PTU,will arrange follow up with her endo. Before discharge.      Sepsis  With fever,leucocytosis,tachycardia,duo to right septic joint,on IV Abx.      VTE Risk Mitigation         Ordered     enoxaparin injection 40 mg  Daily     Route:  Subcutaneous        04/22/17 0903     Medium Risk of VTE  Once      04/21/17 0105     Place sequential compression device  Until discontinued      04/21/17 0105          Alisson Tanner MD  Department of Hospital Medicine   [facility]

## 2017-04-24 NOTE — CONSULTS
NATALIE met with patient to had daughter Jennifer on conference call to discuss discharge plan. NATALIE discussed home IV therapy. NATALIE explained [facility] will provide education on IV abx. NATALIE explained patient will need home health services as well. NATALIE provided a list of in network [facility] home health providers, patient chose [facility]. NATALIE completed patient choice form with patient. NATALIE informed patient a rolling walker and shower chair was recommended by therapy; NATALIE stated she can order the rolling walker however [employer] does not cover shower chairs. NATALIE suggested to Jennifer going to [facility] to purchase patient a shower chair. NATALIE stated patient will need follow-up appointments with endocrinology, ortho, and PCP. NATALIE introduced the [facility] to patient and daughter; SW explained the benefits and advantages of going to the clinic, both parties in agreement with patient following up. Patient requested to see another doctor for endocrinology on the [facility]. NATALIE stated to patient there's only one endocrinologist on the [facility], patient requested for an appointment. Jennifer stated patient help at home will be her and patient other children. Jennifer prefers afternoon doctor appointments. Address where patient will stay for the next couple of days: [address] [place] [id number]. NATALIE spoke to Lori (Care Transition Coordinator) with [facility] to refer patient. Lori stated she will contact patient daughter Jennifer. NATALIE faxed clinicals to [facility] [employer] @ [phone] enclosed: face sheet, H&P, D/C summary, and home health orders.

## 2017-04-24 NOTE — PLAN OF CARE
Problem: Physical Therapy Goal  Goal: Physical Therapy Goal  Goals to be met by: 17.     Patient will increase functional independence with mobility by performin. Supine <>sit with Stand-by Assistance.  2. Transfer bed <> chair with SBA.   3. Ambulate 50 w/RW SBA.   Outcome: Ongoing (interventions implemented as appropriate)  Pt progressing well with increased ambulation distance.

## 2017-04-24 NOTE — SUBJECTIVE & OBJECTIVE
Past Medical History:   Diagnosis Date    AMD (age-related macular degeneration), bilateral 6/10/2016    Cataract     Coronary artery disease     Diabetes mellitus, type 2     Hyperlipidemia     Hypertension     Hyperthyroidism 8/20/2015    Osteoarthritis     Peripheral arterial disease 4/3/2013       Past Surgical History:   Procedure Laterality Date    ABDOMINAL SURGERY      APPENDECTOMY      CARDIAC SURGERY  2008    CABG    CATARACT EXTRACTION Right     CATARACT EXTRACTION W/  INTRAOCULAR LENS IMPLANT  8/13/2015    Dr. Blount ( [facility])    EYE SURGERY  2011    Rt eye cataract    HERNIA REPAIR      HYSTERECTOMY      HYSTEROTOMY N/A 33 yrs old       Review of patient's allergies indicates:   Allergen Reactions    Pravastatin Other (See Comments)     Muscle pain       No current facility-administered medications on file prior to encounter.      Current Outpatient Prescriptions on File Prior to Encounter   Medication Sig    aspirin 81 MG Chew Take 81 mg by mouth once daily.    cetirizine (ZYRTEC) 10 MG tablet TAKE 1 TABLET (10 MG TOTAL) BY MOUTH DAILY AS NEEDED FOR ALLERGIES OR RHINITIS.    dexamethasone (DECADRON) 0.75 MG Tab Take 1 tablet (0.75 mg total) by mouth 2 (two) times daily.    gabapentin (NEURONTIN) 300 MG capsule Take 1 capsule (300 mg total) by mouth every evening.    glipiZIDE (GLUCOTROL) 10 MG tablet TAKE TWO PILLS BY MOUTH EVERY MORNING AND ONE PILL BY MOUTH EVERY EVENING.    hydrochlorothiazide (HYDRODIURIL) 25 MG tablet TAKE 1 TABLET (25 MG TOTAL) BY MOUTH ONCE DAILY.    hydrOXYzine (ATARAX) 25 MG tablet TAKE ONE OR TWO TABLETS EVERY 4-6 HOURS AS NEEDED FOR ITCHING. THIS MAY MAKE YOU MORE PRONE TO FALLING.    metformin (GLUCOPHAGE) 500 MG tablet Take 1 tablet (500 mg total) by mouth 2 (two) times daily with meals.    metoprolol tartrate (LOPRESSOR) 50 MG tablet Take 1 tablet (50 mg total) by mouth once daily.    ramipril (ALTACE) 5 MG capsule Take 1 capsule (5 mg total)  by mouth once daily.    ACCU-CHEK SMARTVIEW TEST STRIP Strp     ALCOHOL PREP PADS PadM     azithromycin (Z-MARYCHUY) 250 MG tablet TAKE 2 TABLETS BY MOUTH TODAY, THEN TAKE 1 TABLET DAILY FOR 4 DAYS    cilostazol (PLETAL) 50 MG Tab Take 1 tablet (50 mg total) by mouth once daily.    diclofenac (VOLTAREN) 50 MG EC tablet Take 1 tablet (50 mg total) by mouth 3 (three) times daily as needed.    fluticasone (FLONASE) 50 mcg/actuation nasal spray USE 1 SPRAY BY EACH NARE ROUTE ONCE DAILY.    ibuprofen (ADVIL,MOTRIN) 200 MG tablet Take 2 tablets (400 mg total) by mouth every 6 (six) hours as needed for Pain.    lancets 30 gauge Misc     lancing device Misc     lidocaine-prilocaine (EMLA) cream Apply topically as needed. To left shoulder    methylPREDNISolone (MEDROL DOSEPACK) 4 mg tablet use as directed    nitroGLYCERIN (NITROSTAT) 0.4 MG SL tablet Place 1 tablet (0.4 mg total) under the tongue every 5 (five) minutes as needed for Chest pain. 1 Tablet, Sublingual Sublingual as needed .  as directed    propylthiouracil (PTU) 50 mg Tab TAKE 2 TABLETS BY MOUTH EVERY 8 HOURS     Family History     Problem Relation (Age of Onset)    Cataracts Father, Sister    Coronary artery disease Father    Diabetes Mother    Heart disease Brother    No Known Problems Maternal Aunt, Maternal Uncle, Paternal Aunt, Paternal Uncle, Maternal Grandmother, Maternal Grandfather, Paternal Grandmother, Paternal Grandfather        Social History Main Topics    Smoking status: Current Every Day Smoker     Packs/day: 1.00    Smokeless tobacco: Not on file    Alcohol use No    Drug use: No    Sexual activity: Not on file     Review of Systems   Constitutional: Negative for activity change and appetite change.   HENT: Negative for congestion and dental problem.    Eyes: Negative for discharge and itching.   Respiratory: Negative for apnea and chest tightness.    Cardiovascular: Negative for chest pain.   Gastrointestinal: Negative for  abdominal distention and abdominal pain.   Endocrine: Negative for cold intolerance.   Genitourinary: Negative for dyspareunia.   Musculoskeletal: Positive for joint swelling.        Right knee   Skin: Negative for color change and pallor.   Allergic/Immunologic: Negative for environmental allergies and food allergies.   Neurological: Negative for dizziness and facial asymmetry.   Hematological: Negative for adenopathy. Does not bruise/bleed easily.   Psychiatric/Behavioral: Negative for agitation and behavioral problems.     Objective:     Vital Signs (Most Recent):  Temp: 97.8 °F (36.6 °C) (04/24/17 0758)  Pulse: 83 (04/24/17 0758)  Resp: 17 (04/24/17 0758)  BP: (!) 121/57 (04/24/17 0758)  SpO2: 95 % (04/24/17 0758) Vital Signs (24h Range):  Temp:  [97.6 °F (36.4 °C)-99.9 °F (37.7 °C)] 97.8 °F (36.6 °C)  Pulse:  [69-89] 83  Resp:  [17-18] 17  SpO2:  [95 %-97 %] 95 %  BP: ()/(54-79) 121/57     Weight: 54 kg (119 lb)  Body mass index is 21.77 kg/(m^2).    Physical Exam   Constitutional: She is oriented to person, place, and time. No distress.   HENT:   Head: Atraumatic.   Eyes: EOM are normal. Pupils are equal, round, and reactive to light.   Neck: Normal range of motion. Neck supple.   Cardiovascular: Normal rate and regular rhythm.    Pulmonary/Chest: Effort normal and breath sounds normal.   Abdominal: Soft.   Musculoskeletal: Normal range of motion. She exhibits tenderness.   Swollen and tender right knee   Neurological: She is oriented to person, place, and time. No cranial nerve deficit. Coordination normal.   Skin: Skin is warm and dry. She is not diaphoretic.   Psychiatric: She has a normal mood and affect. Her behavior is normal.        Significant Labs:   BMP:     Recent Labs  Lab 04/24/17 0418   *      K 3.5      CO2 26   BUN 31*   CREATININE 0.9   CALCIUM 8.8     CBC:     Recent Labs  Lab 04/23/17  0545 04/24/17 0418   WBC 7.16 5.49   HGB 10.0* 9.1*   HCT 30.4* 28.0*     202       Significant Imaging: reviewed

## 2017-04-24 NOTE — PLAN OF CARE
[facility]    HOME HEALTH ORDERS  FACE TO FACE ENCOUNTER    Patient Name: Khloe Kendrick  YOB: 1940    PCP: Aimee Marquis MD   PCP Address: [address] [place] [address]  PCP Phone Number: [phone]  PCP Fax: [phone]    Encounter Date: 04/24/2017    Admit to Home Health    Diagnoses:  Active Hospital Problems    Diagnosis  POA    *Pyogenic arthritis of right knee joint [M00.9]  Yes     Priority: 1 - High    Sepsis [A41.9]  Yes    Hyperthyroidism [E05.90]  Yes     Chronic    Peripheral arterial disease [I73.9]  Yes     Chronic    Diabetes mellitus type 2, controlled [E11.9]  Yes     Chronic    Essential hypertension [I10]  Yes     Chronic    Combined 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       No future appointments.  Follow-up Information     Follow up with Aimee Marquis MD In 1 week.    Specialty:  Family Medicine    Contact information:    [address] [place] [address] [phone]              I have seen and examined this patient face to face today. My clinical findings that support the need for the home health skilled services and home bound status are the following:  Weakness/numbness causing balance and gait disturbance due to Infection and Weakness/Debility making it taxing to leave home.    Allergies:  Review of patient's allergies indicates:   Allergen Reactions    Pravastatin Other (See Comments)     Muscle pain       Diet: 2 gram sodium diet    Activities: activity as tolerated    Nursing:   SN to complete comprehensive assessment including routine vital signs. Instruct on disease process and s/s of complications to report to MD. Review/verify medication list sent home with the patient at time of discharge  and instruct patient/caregiver as needed. Frequency  may be adjusted depending on start of care date.    Notify MD if SBP > 160 or < 90; DBP > 90 or < 50; HR > 120 or < 50; Temp > 101; Other:         CONSULTS:    Physical Therapy to evaluate and treat. Evaluate for home safety and equipment needs; Establish/upgrade home exercise program. Perform / instruct on therapeutic exercises, gait training, transfer training, and Range of Motion.  Occupational Therapy to evaluate and treat. Evaluate home environment for safety and equipment needs. Perform/Instruct on transfers, ADL training, ROM, and therapeutic exercises.    MISCELLANEOUS CARE:  Routine Skin for Bedridden Patients: Instruct patient/caregiver to apply moisture barrier cream to all skin folds and wet areas in perineal area daily and after baths and all bowel movements.    WOUND CARE ORDERS  n/a      Medications: Review discharge medications with patient and family and provide education.      Current Discharge Medication List      START taking these medications    Details   ciprofloxacin HCl (CIPRO) 500 MG tablet Take 1 tablet (500 mg total) by mouth every 12 (twelve) hours.  Qty: 56 tablet, Refills: 0      dextrose 5 % SolP 250 mL with vancomycin 1,000 mg SolR 1,250 mg Inject 1,250 mg into the vein once daily.         CONTINUE these medications which have CHANGED    Details   propylthiouracil (PTU) 50 mg Tab Take 2 tablets (100 mg total) by mouth every 12 (twelve) hours.  Qty: 60 tablet, Refills: 0         CONTINUE these medications which have NOT CHANGED    Details   aspirin 81 MG Chew Take 81 mg by mouth once daily.      cetirizine (ZYRTEC) 10 MG tablet TAKE 1 TABLET (10 MG TOTAL) BY MOUTH DAILY AS NEEDED FOR ALLERGIES OR RHINITIS.  Refills: 5      gabapentin (NEURONTIN) 300 MG capsule Take 1 capsule (300 mg total) by mouth every evening.  Qty: 30 capsule, Refills: 11    Associated Diagnoses: Controlled type 2 diabetes mellitus with diabetic polyneuropathy, without long-term current use of insulin       glipiZIDE (GLUCOTROL) 10 MG tablet TAKE TWO PILLS BY MOUTH EVERY MORNING AND ONE PILL BY MOUTH EVERY EVENING.  Qty: 270 tablet, Refills: 1    Associated Diagnoses: Controlled type 2 diabetes mellitus with diabetic polyneuropathy, without long-term current use of insulin      hydrOXYzine (ATARAX) 25 MG tablet TAKE ONE OR TWO TABLETS EVERY 4-6 HOURS AS NEEDED FOR ITCHING. THIS MAY MAKE YOU MORE PRONE TO FALLING.  Qty: 30 tablet, Refills: 0      metformin (GLUCOPHAGE) 500 MG tablet Take 1 tablet (500 mg total) by mouth 2 (two) times daily with meals.  Qty: 180 tablet, Refills: 1    Associated Diagnoses: Diabetes mellitus type 2, controlled      metoprolol tartrate (LOPRESSOR) 50 MG tablet Take 1 tablet (50 mg total) by mouth once daily.  Qty: 90 tablet, Refills: 1    Associated Diagnoses: Essential hypertension; Coronary artery disease involving native coronary artery of native heart with angina pectoris      ramipril (ALTACE) 5 MG capsule Take 1 capsule (5 mg total) by mouth once daily.  Qty: 90 capsule, Refills: 1    Associated Diagnoses: Essential hypertension      ACCU-CHEK SMARTVIEW TEST STRIP Strp       ALCOHOL PREP PADS PadM       cilostazol (PLETAL) 50 MG Tab Take 1 tablet (50 mg total) by mouth once daily.  Qty: 90 tablet, Refills: 1    Associated Diagnoses: Peripheral arterial disease; Atherosclerosis of aorta      fluticasone (FLONASE) 50 mcg/actuation nasal spray USE 1 SPRAY BY EACH NARE ROUTE ONCE DAILY.  Refills: 5      lancets 30 gauge Misc       lancing device Misc       lidocaine-prilocaine (EMLA) cream Apply topically as needed. To left shoulder  Qty: 30 g, Refills: 0      nitroGLYCERIN (NITROSTAT) 0.4 MG SL tablet Place 1 tablet (0.4 mg total) under the tongue every 5 (five) minutes as needed for Chest pain. 1 Tablet, Sublingual Sublingual as needed .  as directed  Qty: 90 tablet, Refills: 0         STOP taking these medications       dexamethasone (DECADRON) 0.75 MG Tab Comments:   Reason for  Stopping:         hydrochlorothiazide (HYDRODIURIL) 25 MG tablet Comments:   Reason for Stopping:         azithromycin (Z-MARYCHUY) 250 MG tablet Comments:   Reason for Stopping:         diclofenac (VOLTAREN) 50 MG EC tablet Comments:   Reason for Stopping:         ibuprofen (ADVIL,MOTRIN) 200 MG tablet Comments:   Reason for Stopping:         methylPREDNISolone (MEDROL DOSEPACK) 4 mg tablet Comments:   Reason for Stopping:         tramadol (ULTRAM) 50 mg tablet Comments:   Reason for Stopping:               I certify that this patient is confined to her home and needs intermittent skilled nursing care, physical therapy and occupational therapy.

## 2017-04-24 NOTE — PT/OT/SLP PROGRESS
Physical Therapy  Treatment    Khloe Kendrick   MRN: [id number]   Admitting Diagnosis: Pyogenic arthritis of right knee joint    PT Received On: 04/24/17  PT Start Time: 1538     PT Stop Time: 1553    PT Total Time (min): 15 min       Billable Minutes:  Gait Training 15 min    Treatment Type: Treatment  PT/PTA: PT     PTA Visit Number: 0       General Precautions: Standard, fall, diabetic  Orthopedic Precautions: Full weight bearing     Subjective:    Pt reported going home today.  Pt also reported lack of sleep.    Pain Rating:  (yes)  Location - Side: Right     Location: knee  Pain Addressed: Cessation of Activity       Objective:   Patient found with: PICC line    Functional Mobility:  Bed Mobility:   Scooting/Bridging: Stand by Assistance  Supine to Sit: Stand by Assistance    Transfers:  Sit <> Stand Assistance: Stand By Assistance  Sit <> Stand Assistive Device: Rolling Walker    Gait:   Gait Distance: 200 ft   Assistance 1: Stand by Assistance  Gait Assistive Device: Rolling walker  Gait Pattern: 3-point gait  Gait Deviation(s): decreased weight-shifting ability, decreased toe-to-floor clearance, decreased step length, decreased velocity of limb motion, increased time in double stance, decreased mo      Balance:   Static Sit: GOOD: Takes MODERATE challenges from all directions  Dynamic Sit: GOOD: Maintains balance through MODERATE excursions of active trunk movement  Static Stand: FAIR+: Takes MINIMAL challenges from all directions  Dynamic stand: FAIR+: Needs CLOSE SUPERVISION during gait and is able to right self with minor LOB     Therapeutic Activities and Exercises:  RLE seated therex 10 reps: AP, LAQ, HS, and hip flex     AM-PAC 6 CLICK MOBILITY  How much help from another person does this patient currently need?   1 = Unable, Total/Dependent Assistance  2 = A lot, Maximum/Moderate Assistance  3 = A little, Minimum/Contact Guard/Supervision  4 = None, Modified [place]/Independent    Turning  over in bed (including adjusting bedclothes, sheets and blankets)?: 4  Sitting down on and standing up from a chair with arms (e.g., wheelchair, bedside commode, etc.): 4  Moving from lying on back to sitting on the side of the bed?: 4  Moving to and from a bed to a chair (including a wheelchair)?: 4  Need to walk in hospital room?: 3  Climbing 3-5 steps with a railing?: 3  Total Score: 22    AM-PAC Raw Score CMS G-Code Modifier Level of Impairment Assistance   6 % Total / Unable   7 - 9 CM 80 - 100% Maximal Assist   10 - 14 CL 60 - 80% Moderate Assist   15 - 19 CK 40 - 60% Moderate Assist   20 - 22 CJ 20 - 40% Minimal Assist   23 CI 1-20% SBA / CGA   24 CH 0% Independent/ Mod I     Patient left up in chair with all lines intact and call button in reach.    Assessment:    Rehab identified problem list/impairments: Rehab identified problem list/impairments: weakness, gait instability, impaired balance, decreased lower extremity function, pain, decreased ROM, edema    Rehab potential is good.    Activity tolerance: Fair    Discharge recommendations: Discharge Facility/Level Of Care Needs: home health PT     Barriers to discharge: Barriers to Discharge: None    Equipment recommendations: Equipment Needed After Discharge: walker, rolling     GOALS:   Physical Therapy Goals        Problem: Physical Therapy Goal    Goal Priority Disciplines Outcome Goal Variances Interventions   Physical Therapy Goal     PT/OT, PT Ongoing (interventions implemented as appropriate)     Description:  Goals to be met by: 17.     Patient will increase functional independence with mobility by performin. Supine <>sit with Stand-by Assistance.  2. Transfer bed <> chair with SBA.   3. Ambulate 50' w/RW SBA.                PLAN:    Patient to be seen 5 x/week (Mon-Fri)  to address the above listed problems via gait training, therapeutic activities, therapeutic exercises  Plan of Care expires: 17  Plan of Care reviewed  with: patient         Luzma Colon, PT  04/24/2017

## 2017-04-24 NOTE — PLAN OF CARE
Margareth RN with [facility] came to hospital to provide education to patient on IV abx. Margareth informed SW she will have to contact patient daughter Jennifer because she will need to provide education to her as well. Margareth confirmed with nurse Delong patient received her dose today. Margareth will go out to patient home tomorrow morning and provide education to Jennifer. NATALIE met with patient to provide and review discharge follow-up appointment sheet. SW also delivered rolling walker, patient signed delivery ticket. NATALIE informed nurse Sindi  completed all discharge planning for patient and she could complete her nursing education/discharge whenever ready.       04/24/17 3197   Final Note   Assessment Type Final Discharge Note   Discharge Disposition Home-Health   Hospital Follow Up  Appt(s) scheduled? Yes   Discharge/Hospital Encounter Summary to (non-[facility]) PCP n/a   Referral to Outpatient Case Management complete? n/a   Referral to / orders for Home Health Complete? Yes   30 day supply of medicines given at discharge, if documented non-compliance / non-adherence? n/a   Any social issues identified prior to discharge? n/a   Did you assess the readiness or willingness of the family or caregiver to support self management of care? n/a   Right Care Referral Info   Post Acute Recommendation Home-care

## 2017-04-24 NOTE — CONSULTS
Consult Note  Infectious Disease    Consult Requested By: Alisson Tanner MD    Reason for Consult: rt knee septic arthritis    SUBJECTIVE:     History of Present Illness:  Patient is a 76 y.o. female presents with rt knee pain and swelling. She denies a fall. She denies injections into knee. She denies skin and soft tissue infections prior. She became ill with a rt knee swelling and pain on the day of admit. She was noted to have synovial fluid that was with 99539 wbc with 97% segs.she had no crystals. Cultures grew nothing. She was taken to the or and a wash out performed 4/21/17. Cultures are negative so far. She has intermittent fever that has now resolved. She is edentulous. She denies dysuria. She feels a 100% better after wash out an antibiotics.  Past Medical History:   Diagnosis Date    AMD (age-related macular degeneration), bilateral 6/10/2016    Cataract     Coronary artery disease     Diabetes mellitus, type 2     Hyperlipidemia     Hypertension     Hyperthyroidism 8/20/2015    Osteoarthritis     Peripheral arterial disease 4/3/2013     Past Surgical History:   Procedure Laterality Date    ABDOMINAL SURGERY      APPENDECTOMY      CARDIAC SURGERY  2008    CABG    CATARACT EXTRACTION Right     CATARACT EXTRACTION W/  INTRAOCULAR LENS IMPLANT  8/13/2015    Dr. Blount ( [facility])    EYE SURGERY  2011    Rt eye cataract    HERNIA REPAIR      HYSTERECTOMY      HYSTEROTOMY N/A 33 yrs old     Family History   Problem Relation Age of Onset    Diabetes Mother     Coronary artery disease Father     Cataracts Father     Heart disease Brother     Cataracts Sister     No Known Problems Maternal Aunt     No Known Problems Maternal Uncle     No Known Problems Paternal Aunt     No Known Problems Paternal Uncle     No Known Problems Maternal Grandmother     No Known Problems Maternal Grandfather     No Known Problems Paternal Grandmother     No Known Problems Paternal Grandfather      Amblyopia Neg Hx     Blindness Neg Hx     Cancer Neg Hx     Glaucoma Neg Hx     Hypertension Neg Hx     Macular degeneration Neg Hx     Retinal detachment Neg Hx     Strabismus Neg Hx     Stroke Neg Hx     Thyroid disease Neg Hx      Social History   Substance Use Topics    Smoking status: Current Every Day Smoker     Packs/day: 1.00    Smokeless tobacco: None    Alcohol use No       Review of patient's allergies indicates:   Allergen Reactions    Pravastatin Other (See Comments)     Muscle pain        Antibiotics     Start     Stop Route Frequency Ordered    04/22/17 2100  piperacillin-tazobactam 4.5 g in sodium chloride 0.9% 100 mL IVPB (ready to mix system)      -- IV Every 8 hours (non-standard times) 04/22/17 2002 04/24/17 0201  vancomycin (VANCOCIN) 1,250 mg in dextrose 5 % 250 mL IVPB  (Vancomycin IVPB with levels panel)      -- IV Every 24 hours (non-standard times) 04/24/17 0202          Review of Systems:  Constitutional: positive for chills, fevers and malaise  Eyes: no visual changes  ENT: no nasal congestion or sore throat  Respiratory: no cough or shortness of breath  Cardiovascular: no chest pain or palpitations  Gastrointestinal: no nausea or vomiting, no abdominal pain or change in bowel habits  Genitourinary: no hematuria or dysuria  Hematologic/Lymphatic: no easy bruising or lymphadenopathy  Musculoskeletal: rt knee hurts but is markedly better    OBJECTIVE:     Vital Signs (Most Recent)  Temp: 97.8 °F (36.6 °C) (04/24/17 0758)  Pulse: 83 (04/24/17 0758)  Resp: 17 (04/24/17 0758)  BP: (!) 121/57 (04/24/17 0758)  SpO2: 95 % (04/24/17 0758)    Temperature Range Min/Max (Last 24H):  Temp:  [97.6 °F (36.4 °C)-99.9 °F (37.7 °C)]     Physical Exam:  General: well developed, well nourished  Eyes: conjunctivae/corneas clear. PERRL..  HENT: Head:normocephalic, atraumatic. Ears:not examined. Nose: Nares normal. Septum midline. Mucosa normal. No drainage or sinus tenderness., no discharge.  Throat: lips, mucosa, and tongue normal; teeth and gums normal and no throat erythema.  Neck: supple, symmetrical, trachea midline, no JVD and thyroid not enlarged, symmetric, no tenderness/mass/nodules  Lungs:  clear to auscultation bilaterally and normal respiratory effort  Cardiovascular: Heart: regular rate and rhythm, S1, S2 normal, no murmur, click, rub or gallop. Chest Wall: no tenderness. Extremitiesrt knee is less swollen and not red Pulses: 2+ and symmetric.  Abdomen/Rectal: Abdomen: soft, non-tender non-distented; bowel sounds normal; no masses,  no organomegaly. Rectal: not examined  Skin: Skin color, texture, turgor normal. No rashes or lesions  Musculoskeletal:no clubbing, cyanosis  Lymph Nodes: No cervical or supraclavicular adenopathy    Laboratory:  CBC    Recent Labs  Lab 04/24/17 0418   WBC 5.49   RBC 3.39*   HGB 9.1*   HCT 28.0*        BMP    Recent Labs  Lab 04/24/17 0418   CO2 26   BUN 31*   CREATININE 0.9   CALCIUM 8.8       Recent Labs  Lab 04/20/17 1957   COLORU Yellow   SPECGRAV 1.015   PHUR 5.0   PROTEINUA Negative   BACTERIA Occasional   NITRITE Negative   LEUKOCYTESUR Negative   UROBILINOGEN Negative     Microbiology Results (last 7 days)     Procedure Component Value Units Date/Time    Culture, Body Fluid (Aerobic) w/ GS [[id number]] Collected:  04/20/17 1931    Order Status:  Completed Specimen:  Joint Fluid from Knee, Right Updated:  04/24/17 0721     AEROBIC CULTURE - FLUID No growth     Gram Stain Result Cytospin indicates:      Many WBC's      No organisms seen    Blood culture [[id number]] Collected:  04/20/17 2214    Order Status:  Completed Specimen:  Blood from Peripheral, Antecubital, Left Updated:  04/23/17 2303     Blood Culture, Routine No Growth to date     Blood Culture, Routine No Growth to date     Blood Culture, Routine No Growth to date     Blood Culture, Routine No Growth to date    Blood Culture #2 **CANNOT BE ORDERED STAT** [[id number]] Collected:  04/20/17  2220    Order Status:  Completed Specimen:  Blood from Peripheral, Antecubital, Right Updated:  04/23/17 2303     Blood Culture, Routine No Growth to date     Blood Culture, Routine No Growth to date     Blood Culture, Routine No Growth to date     Blood Culture, Routine No Growth to date    Blood culture [[id number]] Collected:  04/22/17 2045    Order Status:  Completed Specimen:  Blood Updated:  04/23/17 2303     Blood Culture, Routine No Growth to date     Blood Culture, Routine No Growth to date    Narrative:       Blood cultures x 2    Blood culture [[id number]] Collected:  04/22/17 2045    Order Status:  Completed Specimen:  Blood Updated:  04/23/17 2303     Blood Culture, Routine No Growth to date     Blood Culture, Routine No Growth to date    Narrative:       Blood cultures x 2    Culture, Anaerobe [[id number]] Collected:  04/21/17 1408    Order Status:  Completed Specimen:  Joint Fluid from Knee, Right Updated:  04/23/17 0810     Anaerobic Culture Culture in progress    Narrative:       Right knee    Aerobic culture [[id number]] Collected:  04/21/17 1408    Order Status:  Completed Specimen:  Joint Fluid from Knee, Right Updated:  04/23/17 0724     Aerobic Bacterial Culture No growth    Narrative:       Right knee    Gram stain [[id number]] Collected:  04/21/17 1408    Order Status:  Completed Specimen:  Joint Fluid from Knee, Right Updated:  04/21/17 1502     Gram Stain Result Moderate WBC's      No organisms seen    Narrative:       Right knee    Culture, Body Fluid (Aerobic) w/ GS [[id number]]     Order Status:  No result Specimen:  Joint Fluid from Knee, Right     Gram stain [[id number]] Collected:  04/20/17 1931    Order Status:  Canceled Specimen:  Joint Fluid from Knee, Right Updated:  04/1939    Narrative:       Gram Stain was cancelled on 04/21/2017 at 08:10 by JGO; Duplicate   order, test included in another profile. 04/21/2017  08:10          Diagnostic Results:  Labs: Reviewed  X-Ray:  Reviewed    ASSESSMENT/PLAN:     Active Hospital Problems    Diagnosis  POA    *Pyogenic arthritis of right knee joint [M00.9]  Yes    Sepsis [A41.9]  Yes    Hyperthyroidism [E05.90]  Yes     Chronic    Peripheral arterial disease [I73.9]  Yes     Chronic    Diabetes mellitus type 2, controlled [E11.9]  Yes     Chronic    Essential hypertension [I10]  Yes     Chronic    Combined 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       1. Rt knee septic arthritis ? Unsure how this happened  Note rt bakers cyst- could this have been infected  Iv vanco and po cipro x 28 days  picc to be placed  hhc orders written

## 2017-04-24 NOTE — PROGRESS NOTES
Follow-up Information     Follow up with [facility].    Specialty:  Home Health Services    Why:  Home Health Provider    Contact information:    [address]  [facility] [address]  [phone]          Follow up with [facility].    Specialty:  Home Health Services    Why:  Infusion Provider    Contact information:    [address] [place] [address]  [phone]          Follow up with Chastity Tyson MD. Go on 5/9/2017.    Specialty:  Endocrinology    Why:  Outpatient Services, Endocrinology Follow-up Appointment, Please arrive to clinic for 2:00PM    Contact information:    [address] [facility]  [address]  [facility] [address]  [phone]          Follow up with [facility]. Go on 4/27/2017.    Specialty:  Priority Care    Why:  Outpatient Services, Hospital Follow-up Appointment, Please arrive to clinic for 2:00PM    Contact information:    [address] [facility] [address]  [facility] [address] [phone]        Follow up with Javier Collins MD. Go on 5/2/2017.    Specialty:  Orthopedic Surgery    Why:  Outpatient Services, Surgery Follow-up Appointment, Please arrive to clinic for 1:45PM    Contact information:    [address] [facility]  [address]  [facility] [address]  [phone]          Follow up with [facility].    Specialty:  DME Provider    Why:  DME Provider (Dale Quinones)    Contact information:    [address] [facility]  [address]  [facility] [address]  [phone]          [facility]    WRITTEN HEALTHCARE AND DISCHARGE INFORMATION                            Help at Home           [phone]  After discharge for assistance [facility] On Call Nurse Care Line 24/7  Assistance    Things You are responsible For To Manage Your Care At Home:  1.    Getting your prescriptions filled   2.    Taking your medications as directed, DO NOT MISS ANY DOSES!  3.    Going to your follow-up doctor appointment. This is important because it  allow the  doctor to monitor your progress and determine if  any changes need to made to your treatment plan.     Thank you for choosing [facility] for your care.  Please answer any calls you may receive from [facility] we want to continue to support you as you manage your healthcare needs. [facility] is happy to have the opportunity to serve you.     Sincerely,  Your [facility] Team,  Nadia Vazquez LMSW    II  [phone]

## 2017-04-24 NOTE — ASSESSMENT & PLAN NOTE
Will continue with broad spectrum IV Abx,follow cultures,S/P washout by Ortho,follow cultures.ID is following.no growth in cultures.

## 2017-04-24 NOTE — PROCEDURES
"Khloe Kendrick is a 76 y.o. female patient.    Temp: 97.8 °F (36.6 °C) (04/24/17 0758)  Pulse: 83 (04/24/17 0758)  Resp: 17 (04/24/17 0758)  BP: (!) 121/57 (04/24/17 0758)  SpO2: 95 % (04/24/17 0758)  Weight: 54 kg (119 lb) (04/20/17 1504)  Height: 5' 2" (157.5 cm) (04/20/17 1504)    PICC  Date/Time: 4/24/2017 1:15 PM  Consent Done: Yes  Time out: Immediately prior to procedure a time out was called to verify the correct patient, procedure, equipment, support staff and site/side marked as required  Indications: med administration and vascular access  Anesthesia: local infiltration  Local anesthetic: lidocaine 1% without epinephrine  Anesthetic Total (mL): 4  Preparation: skin prepped with ChloraPrep  Skin prep agent dried: skin prep agent completely dried prior to procedure  Sterile barriers: all five maximum sterile barriers used - cap, mask, sterile gown, sterile gloves, and large sterile sheet  Hand hygiene: hand hygiene performed prior to central venous catheter insertion  Location details: right basilic  Catheter type: double lumen  Catheter size: 5 Fr  Catheter Length: 34cm    Ultrasound guidance: yes  Vessel Caliber: medium and patent, compressibility normal  Vascular Doppler: not done  Needle advanced into vessel with real time Ultrasound guidance.  Guidewire confirmed in vessel.  Sterile sheath used.  Manometry: esophageal manometry  Number of attempts: 1  Post-procedure: blood return through all ports, chlorhexidine patch and sterile dressing applied  Estimated blood loss (mL): 1          Willy Crystal  4/24/2017  "

## 2017-04-24 NOTE — CONSULTS
NATALIE contacted Dr. Tyson office @ [phone] to schedule endocrinology follow-up, SW spoke to Alicia, appointment scheduled for 5/9/17 @ 2:00pm. NATALIE contacted [facility] Scheduling line @ [phone] to schedule hospital follow-up, NATALIE spoke to Keily, appointment scheduled for 4/27/17 @ 2:00pm. NATALIE contacted Dr Collins office @ [phone] to schedule surgery follow-up, NATALIE spoke to Gayle, appointment scheduled for 5/2/17 @ 1:45pm.

## 2017-04-24 NOTE — DISCHARGE SUMMARY
"[facility] Hospital Medicine  Discharge Summary      Patient Name: Khloe Kendrick  MRN: [id number]  Admission Date: 4/20/2017  Hospital Length of Stay: 4 days  Discharge Date and Time:  04/24/2017 11:32 AM  Attending Physician: Alisson Tanner MD   Discharging Provider: Alisson Tanner MD  Primary Care Provider: Aimee Marquis MD      HPI:   75 y/o female with AMD, CAD, DM type II, HTN, hyperthyroidism, osteoarthritis, and peripheral arterial disease presents to the ED c/o acute onset atraumatic R sided knee pain that radiates down back of R sided calf that started yesterday, Pain is severe (10/10). Movement and palpation exacerbates the pain. Pt reports similar symptoms last yr on her L leg; however, pt states that her doctor wasn't able to figure out the problem. Pt reports sleeping with her legs "twisted." Pt also reports itchiness to her bilateral arms and legs. Pt denies hx of blood clots. Pt denies fever, chills, or cough. No attempted treatment reported. No alleviating factors or other symptoms reported.patient has negative DVT study,X ray show no sign of fracture,she has has leucocytosis with elevated ESR and CRP,she had joint aspiration in ER ,which show many WBC,her right knee is swollen and very tender,no crystals has been seen,no major growth at this time,consulted Ortho and ID for further evaluation,keep patient NPO at this time.    Procedure(s) (LRB):  INCISION AND DRAINAGE-KNEE arthroscopic (Right)      Indwelling Lines/Drains at time of discharge:   Lines/Drains/Airways          No matching active lines, drains, or airways        Hospital Course:   75 y/o female with AMD, CAD, DM type II, HTN, hyperthyroidism, osteoarthritis, and peripheral arterial disease presents to the ED c/o acute onset atraumatic R sided knee pain that radiates down back of R sided , Pain is severe (10/10). Movement and palpation exacerbates the pain. Pt reports similar symptoms last year on " "her L leg; however, pt states that her doctor wasn't able to figure out the problem. Pt reports sleeping with her legs "twisted." Pt also reports itchiness to her bilateral arms and legs. Pt denies hx of blood clots. Pt denies fever, chills, or cough. No attempted treatment reported. No alleviating factors or other symptoms reported.patient has negative DVT study,X ray show no sign of fracture,she has had leucocytosis with elevated ESR and CRP,she had joint aspiration in ER ,which show many WBC,her right knee was very  swollen and very tender,no crystals has been seen,no major growth at this time,consulted Ortho and ID for further evaluation,S/P washout by ortho on 4.22.17.followed cultures.fever resolved,no major growth in cultures,but many WBC,her pain, swelling much improved,per ID recommendation patient has been discharged home with IV Abx with vancomycin and PO cipro for 4 weeks with HH and follow up with PCP in next few days.     Consults:   Consults         Status Ordering Provider     Inpatient consult to Infectious Diseases  Once     Provider:  Beny Smith MD    Completed ANTWAN HUDSON     Inpatient consult to Orthopedic Surgery  Once     Provider:  Christiano Menendez MD    Acknowledged ALEXANDRA ADAMS     Inpatient consult to Orthopedic Surgery  Once     Provider:  Javier Collins MD    Completed ANTWAN HUDSON     Inpatient consult to PICC team ([facility])  Once     Provider:  (Not yet assigned)    Ordered ANTWAN HUDSON     Inpatient consult to Social Work  Once     Provider:  (Not yet assigned)    Acknowledged ANTWAN HUDSON     Inpatient consult to Social Work  Once     Provider:  (Not yet assigned)    Acknowledged BENY SMITH          Significant Diagnostic Studies: Labs:   BMP:   Recent Labs  Lab 04/23/17  0545 04/24/17  0418   * 144*    140   K 3.1* 3.5    107   CO2 27 26   BUN 25* 31*   CREATININE 0.9 0.9   CALCIUM 8.8 8.8    and CBC   Recent " Labs  Lab 04/23/17  0545 04/24/17  0418   WBC 7.16 5.49   HGB 10.0* 9.1*   HCT 30.4* 28.0*    202     Microbiology:   Blood Culture   Lab Results   Component Value Date    LABBLOO No Growth to date 04/22/2017    LABBLOO No Growth to date 04/22/2017    LABBLOO No Growth to date 04/22/2017    LABBLOO No Growth to date 04/22/2017    and knee aspiration   Radiology: X-Ray: right knee    Pending Diagnostic Studies:     None        Final Active Diagnoses:    Diagnosis Date Noted POA    PRINCIPAL PROBLEM:  Pyogenic arthritis of right knee joint [M00.9] 04/20/2017 Yes    Sepsis [A41.9] 04/22/2017 Yes    Hyperthyroidism [E05.90] 08/20/2015 Yes     Chronic    Peripheral arterial disease [I73.9] 04/03/2013 Yes     Chronic    Diabetes mellitus type 2, controlled [E11.9]  Yes     Chronic    Essential hypertension [I10]  Yes     Chronic    Combined hyperlipidemia associated with type 2 diabetes mellitus [E11.69, E78.2]  Yes     Chronic    Osteoarthritis [M19.90]  Yes      Problems Resolved During this Admission:    Diagnosis Date Noted Date Resolved POA      * Pyogenic arthritis of right knee joint  Will continue with broad spectrum IV Abx,follow cultures,S/P washout by Ortho,follow cultures.ID is following.no growth in cultures.      Diabetes mellitus type 2, controlled  on SSI.      Essential hypertension  Continue with home medication,use prn IV Hydralazine.      Combined hyperlipidemia associated with type 2 diabetes mellitus  On statin.      Osteoarthritis  More on the knees,pain management,PT,OT      Peripheral arterial disease  On pletal.ASA      Hyperthyroidism  On PTU,checked TSH.free T 4,not controlled,increased PTU,will arrange follow up with her endo. Before discharge.      Sepsis  With fever,leucocytosis,tachycardia,duo to right septic joint,on IV Abx.        Discharged Condition: stable    Disposition: Home-Health Care c    Follow Up:  Follow-up Information     Follow up with Aimee Marquis MD In 1  week.    Specialty:  Family Medicine    Contact information:    [address] [id number] [phone]          Patient Instructions:     Diet general     Activity as tolerated       Medications:  Reconciled Home Medications:   Current Discharge Medication List      START taking these medications    Details   ciprofloxacin HCl (CIPRO) 500 MG tablet Take 1 tablet (500 mg total) by mouth every 12 (twelve) hours.  Qty: 56 tablet, Refills: 0      dextrose 5 % SolP 250 mL with vancomycin 1,000 mg SolR 1,250 mg Inject 1,250 mg into the vein once daily.         CONTINUE these medications which have CHANGED    Details   propylthiouracil (PTU) 50 mg Tab Take 2 tablets (100 mg total) by mouth every 12 (twelve) hours.  Qty: 60 tablet, Refills: 0         CONTINUE these medications which have NOT CHANGED    Details   aspirin 81 MG Chew Take 81 mg by mouth once daily.      cetirizine (ZYRTEC) 10 MG tablet TAKE 1 TABLET (10 MG TOTAL) BY MOUTH DAILY AS NEEDED FOR ALLERGIES OR RHINITIS.  Refills: 5      gabapentin (NEURONTIN) 300 MG capsule Take 1 capsule (300 mg total) by mouth every evening.  Qty: 30 capsule, Refills: 11    Associated Diagnoses: Controlled type 2 diabetes mellitus with diabetic polyneuropathy, without long-term current use of insulin      glipiZIDE (GLUCOTROL) 10 MG tablet TAKE TWO PILLS BY MOUTH EVERY MORNING AND ONE PILL BY MOUTH EVERY EVENING.  Qty: 270 tablet, Refills: 1    Associated Diagnoses: Controlled type 2 diabetes mellitus with diabetic polyneuropathy, without long-term current use of insulin      hydrOXYzine (ATARAX) 25 MG tablet TAKE ONE OR TWO TABLETS EVERY 4-6 HOURS AS NEEDED FOR ITCHING. THIS MAY MAKE YOU MORE PRONE TO FALLING.  Qty: 30 tablet, Refills: 0      metformin (GLUCOPHAGE) 500 MG tablet Take 1 tablet (500 mg total) by mouth 2 (two) times daily with meals.  Qty: 180 tablet, Refills: 1    Associated Diagnoses: Diabetes mellitus type 2, controlled      metoprolol tartrate  (LOPRESSOR) 50 MG tablet Take 1 tablet (50 mg total) by mouth once daily.  Qty: 90 tablet, Refills: 1    Associated Diagnoses: Essential hypertension; Coronary artery disease involving native coronary artery of native heart with angina pectoris      ramipril (ALTACE) 5 MG capsule Take 1 capsule (5 mg total) by mouth once daily.  Qty: 90 capsule, Refills: 1    Associated Diagnoses: Essential hypertension      ACCU-CHEK SMARTVIEW TEST STRIP Strp       ALCOHOL PREP PADS PadM       cilostazol (PLETAL) 50 MG Tab Take 1 tablet (50 mg total) by mouth once daily.  Qty: 90 tablet, Refills: 1    Associated Diagnoses: Peripheral arterial disease; Atherosclerosis of aorta      fluticasone (FLONASE) 50 mcg/actuation nasal spray USE 1 SPRAY BY EACH NARE ROUTE ONCE DAILY.  Refills: 5      lancets 30 gauge Misc       lancing device Misc       lidocaine-prilocaine (EMLA) cream Apply topically as needed. To left shoulder  Qty: 30 g, Refills: 0      nitroGLYCERIN (NITROSTAT) 0.4 MG SL tablet Place 1 tablet (0.4 mg total) under the tongue every 5 (five) minutes as needed for Chest pain. 1 Tablet, Sublingual Sublingual as needed .  as directed  Qty: 90 tablet, Refills: 0         STOP taking these medications       dexamethasone (DECADRON) 0.75 MG Tab Comments:   Reason for Stopping:         hydrochlorothiazide (HYDRODIURIL) 25 MG tablet Comments:   Reason for Stopping:         azithromycin (Z-MARYCHUY) 250 MG tablet Comments:   Reason for Stopping:         diclofenac (VOLTAREN) 50 MG EC tablet Comments:   Reason for Stopping:         ibuprofen (ADVIL,MOTRIN) 200 MG tablet Comments:   Reason for Stopping:         methylPREDNISolone (MEDROL DOSEPACK) 4 mg tablet Comments:   Reason for Stopping:         tramadol (ULTRAM) 50 mg tablet Comments:   Reason for Stopping:             Time spent on the discharge of patient: 30  minutes    Alisson Tanner MD  Department of Hospital Medicine  [facility]

## 2017-04-24 NOTE — PT/OT/SLP PROGRESS
Occupational Therapy      Khloe Drew Kendrick  MRN: [id number]    Patient not seen today secondary to refusal (1100), then PICC being placed (1230). Discharge planning in progress for D/C home with home health . Will follow-up as able.    SALEEM Holley, MS  4/24/2017

## 2017-04-25 LAB
BACTERIA BLD CULT: NORMAL
BACTERIA BLD CULT: NORMAL
BACTERIA SPEC AEROBE CULT: NO GROWTH
BACTERIA SPEC ANAEROBE CULT: NORMAL

## 2017-04-25 NOTE — ANESTHESIA POSTPROCEDURE EVALUATION
"Anesthesia Post Evaluation    Patient: Khloe Kendrick    Procedure(s) Performed: Procedure(s) (LRB):  INCISION AND DRAINAGE-KNEE arthroscopic (Right)  SYNOVECTOMY-KNEE  ARTHROSCOPY-KNEE DEBRIDEMENT (Right)    Final Anesthesia Type: general  Patient location during evaluation: PACU  Patient participation: Yes- Able to Participate  Level of consciousness: awake and alert  Post-procedure vital signs: reviewed and stable  Pain management: adequate  Airway patency: patent  PONV status at discharge: No PONV  Anesthetic complications: no      Cardiovascular status: blood pressure returned to baseline and hemodynamically stable  Respiratory status: unassisted and spontaneous ventilation  Hydration status: euvolemic  Follow-up not needed.        Visit Vitals    /77 (BP Location: Right arm, Patient Position: Lying, BP Method: Automatic)    Pulse 98    Temp 36.8 °C (98.3 °F) (Oral)    Resp 18    Ht 5' 2" (1.575 m)    Wt 54 kg (119 lb)    SpO2 96%    Breastfeeding No    BMI 21.77 kg/m2       Pain/Manish Score: Pain Assessment Performed: Yes (4/24/2017  6:00 PM)  Pain Rating Prior to Med Admin: 9 (4/24/2017  2:05 AM)      "

## 2017-04-25 NOTE — PT/OT/SLP DISCHARGE
Physical Therapy Discharge Summary    Khloe Kendrick  MRN: [id number]   Pyogenic arthritis of right knee joint   Patient Discharged from acute Physical Therapy on 17.  Please refer to prior PT notes for functional status.     Assessment:   Patient has not met goals.  GOALS:   Physical Therapy Goals     Not on file      Multidisciplinary Problems (Resolved)        Problem: Physical Therapy Goal    Goal Priority Disciplines Outcome Goal Variances Interventions   Physical Therapy Goal   (Resolved)     PT/OT, PT ongoing     Description:  Goals to be met by: 17.     Patient will increase functional independence with mobility by performin. Supine <>sit with Stand-by Assistance.  2. Transfer bed <> chair with SBA.   3. Ambulate 50' w/RW SBA.              Reasons for Discontinuation of Therapy Services  Transfer to alternate level of care.      Plan:  Patient Discharged to: Home with Home Health Service.

## 2017-04-26 ENCOUNTER — TELEPHONE (OUTPATIENT)
Dept: ADMINISTRATIVE | Facility: CLINIC | Age: 77
End: 2017-04-26

## 2017-04-26 NOTE — TELEPHONE ENCOUNTER
How many more days of the cipro does patient have left?  She may just need to hold the cilostazol until she completes her antibiotic course.     Did she address her pain with surgery? Did surgery send her home with any pain medications?   The only thing I can recommend without seeing the patient, is for her to take tylenol for her pain. She would need an appointment for any prescription medication.         Note received from       FLAKITO Handley Staff 4 hours ago (10:15 AM)                   Home Health is resting information regarding patient admitted to [facility] for SN and PT on 4/25. Level 2 Drug interaction to Cilostazol/Cipro and Cilostazol/Hydroxyzine and Cipro/hydralazine. Pt c/o pain 6/10 to right knee w/u any RX to control pain. Is there anything that she is able to take that will manage pain? (Routing comment)

## 2017-04-27 ENCOUNTER — OFFICE VISIT (OUTPATIENT)
Dept: PRIMARY CARE CLINIC | Facility: CLINIC | Age: 77
End: 2017-04-27
Payer: MEDICARE

## 2017-04-27 ENCOUNTER — LAB VISIT (OUTPATIENT)
Dept: LAB | Facility: HOSPITAL | Age: 77
End: 2017-04-27
Attending: INTERNAL MEDICINE
Payer: MEDICARE

## 2017-04-27 VITALS
HEART RATE: 72 BPM | DIASTOLIC BLOOD PRESSURE: 50 MMHG | SYSTOLIC BLOOD PRESSURE: 124 MMHG | BODY MASS INDEX: 22.14 KG/M2 | TEMPERATURE: 98 F | OXYGEN SATURATION: 98 % | WEIGHT: 121.06 LBS

## 2017-04-27 DIAGNOSIS — M25.561 ACUTE PAIN OF RIGHT KNEE: ICD-10-CM

## 2017-04-27 DIAGNOSIS — A41.9 SYSTEMIC INFECTION: Primary | ICD-10-CM

## 2017-04-27 DIAGNOSIS — E05.90 HYPERTHYROIDISM: Chronic | ICD-10-CM

## 2017-04-27 DIAGNOSIS — M00.861 ARTHRITIS OF RIGHT KNEE DUE TO OTHER BACTERIA: ICD-10-CM

## 2017-04-27 DIAGNOSIS — E11.21 CONTROLLED TYPE 2 DIABETES MELLITUS WITH DIABETIC NEPHROPATHY, WITHOUT LONG-TERM CURRENT USE OF INSULIN: Primary | Chronic | ICD-10-CM

## 2017-04-27 DIAGNOSIS — I10 ESSENTIAL HYPERTENSION: Chronic | ICD-10-CM

## 2017-04-27 LAB
ALBUMIN SERPL BCP-MCNC: 2.7 G/DL
ALP SERPL-CCNC: 90 U/L
ALT SERPL W/O P-5'-P-CCNC: 16 U/L
ANION GAP SERPL CALC-SCNC: 11 MMOL/L
AST SERPL-CCNC: 31 U/L
BACTERIA BLD CULT: NORMAL
BACTERIA BLD CULT: NORMAL
BASOPHILS # BLD AUTO: 0.03 K/UL
BASOPHILS NFR BLD: 0.3 %
BILIRUB SERPL-MCNC: 0.5 MG/DL
BUN SERPL-MCNC: 20 MG/DL
CALCIUM SERPL-MCNC: 9.1 MG/DL
CHLORIDE SERPL-SCNC: 107 MMOL/L
CO2 SERPL-SCNC: 25 MMOL/L
CREAT SERPL-MCNC: 0.8 MG/DL
DIFFERENTIAL METHOD: ABNORMAL
EOSINOPHIL # BLD AUTO: 0.2 K/UL
EOSINOPHIL NFR BLD: 1.7 %
ERYTHROCYTE [DISTWIDTH] IN BLOOD BY AUTOMATED COUNT: 14.3 %
EST. GFR  (AFRICAN AMERICAN): >60 ML/MIN/1.73 M^2
EST. GFR  (NON AFRICAN AMERICAN): >60 ML/MIN/1.73 M^2
GLUCOSE SERPL-MCNC: 56 MG/DL
HCT VFR BLD AUTO: 34.4 %
HGB BLD-MCNC: 11 G/DL
LYMPHOCYTES # BLD AUTO: 2.1 K/UL
LYMPHOCYTES NFR BLD: 23.4 %
MCH RBC QN AUTO: 26.3 PG
MCHC RBC AUTO-ENTMCNC: 32 %
MCV RBC AUTO: 82 FL
MONOCYTES # BLD AUTO: 0.8 K/UL
MONOCYTES NFR BLD: 9.1 %
NEUTROPHILS # BLD AUTO: 5.9 K/UL
NEUTROPHILS NFR BLD: 65.3 %
PLATELET # BLD AUTO: 274 K/UL
PMV BLD AUTO: 10.6 FL
POTASSIUM SERPL-SCNC: 3.8 MMOL/L
PROT SERPL-MCNC: 6.2 G/DL
RBC # BLD AUTO: 4.18 M/UL
SODIUM SERPL-SCNC: 143 MMOL/L
WBC # BLD AUTO: 8.95 K/UL

## 2017-04-27 PROCEDURE — 1125F AMNT PAIN NOTED PAIN PRSNT: CPT | Mod: S$GLB,,, | Performed by: NURSE PRACTITIONER

## 2017-04-27 PROCEDURE — 3078F DIAST BP <80 MM HG: CPT | Mod: S$GLB,,, | Performed by: NURSE PRACTITIONER

## 2017-04-27 PROCEDURE — 1160F RVW MEDS BY RX/DR IN RCRD: CPT | Mod: S$GLB,,, | Performed by: NURSE PRACTITIONER

## 2017-04-27 PROCEDURE — 36415 COLL VENOUS BLD VENIPUNCTURE: CPT

## 2017-04-27 PROCEDURE — 99499 UNLISTED E&M SERVICE: CPT | Mod: S$GLB,,, | Performed by: NURSE PRACTITIONER

## 2017-04-27 PROCEDURE — 1159F MED LIST DOCD IN RCRD: CPT | Mod: S$GLB,,, | Performed by: NURSE PRACTITIONER

## 2017-04-27 PROCEDURE — 99999 PR PBB SHADOW E&M-EST. PATIENT-LVL IV: CPT | Mod: PBBFAC,,,

## 2017-04-27 PROCEDURE — 85025 COMPLETE CBC W/AUTO DIFF WBC: CPT

## 2017-04-27 PROCEDURE — 80053 COMPREHEN METABOLIC PANEL: CPT

## 2017-04-27 PROCEDURE — 99214 OFFICE O/P EST MOD 30 MIN: CPT | Mod: S$GLB,,, | Performed by: NURSE PRACTITIONER

## 2017-04-27 PROCEDURE — 3074F SYST BP LT 130 MM HG: CPT | Mod: S$GLB,,, | Performed by: NURSE PRACTITIONER

## 2017-04-27 RX ORDER — IBUPROFEN 800 MG/1
800 TABLET ORAL EVERY 6 HOURS PRN
Qty: 15 TABLET | Refills: 0 | Status: SHIPPED | OUTPATIENT
Start: 2017-04-27 | End: 2017-07-12

## 2017-04-27 RX ORDER — DICLOFENAC SODIUM 50 MG/1
50 TABLET, DELAYED RELEASE ORAL 3 TIMES DAILY PRN
COMMUNITY
End: 2017-04-27

## 2017-04-27 NOTE — PROGRESS NOTES
"[facility]  New Visit Progress Note   Recent Hospital Discharge          ATTENDING PHYSICIAN:  Dr. Chloe Gaffney  Current Provider:  CHELO Torres     PRESENTING HISTORY     Chief Complaint/Reason for Visit:  Follow up Hospital Discharge   No chief complaint on file.    PCP: Aimee Marquis MD  History of Present Illness:  Ms. Khloe Kendrick is a 76 y.o. female who was recently admitted to the hospital   Admission Date: 4/20/2017  Discharge Date and Time:  04/24/2017 11:32 AM  -----HX: AMD, CAD, DM type II, HTN, hyperthyroidism, osteoarthritis, and peripheral arterial disease  -----Presents to the ED c/o acute onset atraumatic R sided knee pain that radiates down back of R sided calf that started yesterday, Pain is severe (10/10). Movement and palpation exacerbates the pain. Pt reports similar symptoms last yr on her L leg; however, pt states that her doctor wasn't able to figure out the problem. Pt reports sleeping with her legs "twisted." Pt also reports itchiness to her bilateral arms and legs. Pt denies hx of blood clots. Pt denies fever, chills, or cough. No attempted treatment reported. No alleviating factors or other symptoms reported.patient has negative DVT study,X ray show no sign of fracture,she has has leucocytosis with elevated ESR and CRP,she had joint aspiration in ER ,which show many WBC,her right knee is swollen and very tender,no crystals has been seen,no major growth at this time,consulted Ortho and ID for further evaluation,keep patient NPO at this time. Procedure(s) (LRB): INCISION AND DRAINAGE-KNEE arthroscopic (Right)   -----Hospital Course: 75 y/o female with AMD, CAD, DM type II, HTN, hyperthyroidism, osteoarthritis, and peripheral arterial disease presents to the ED c/o acute onset atraumatic R sided knee pain that radiates down back of R sided , Pain is severe (10/10). Movement and palpation exacerbates the pain. Pt reports similar symptoms last year on her L leg; however, " "pt states that her doctor wasn't able to figure out the problem. Pt reports sleeping with her legs "twisted." Pt also reports itchiness to her bilateral arms and legs. Pt denies hx of blood clots. Pt denies fever, chills, or cough. No attempted treatment reported. No alleviating factors or other symptoms reported.patient has negative DVT study,X ray show no sign of fracture,she has had leucocytosis with elevated ESR and CRP,she had joint aspiration in ER ,which show many WBC,her right knee was very swollen and very tender,no crystals has been seen,no major growth at this time,consulted Ortho and ID for further evaluation,S/P washout by ortho on 4.22.17.followed cultures.fever resolved,no major growth in cultures,but many WBC,her pain, swelling much improved,per ID recommendation patient has been discharged home with IV Abx with vancomycin and PO cipro for 4 weeks with HH and follow up with PCP in next few days.      ___________________________________________________________________    Today Interval History: Presents to  with daughter for follow up post hospitalization for R knee septic arthritis. She had I&D, was seen by Ortho & ID who started the patient on a 28 day course of ABX, She has a RUE PICC that is dry and intact - Denies pain at this visit - L knee incision site dry, intact without redness or drainage with steri-strips. There is good ROM      Review of Systems:  Review of Systems   Constitutional: Negative for chills, fever, malaise/fatigue and weight loss.   HENT: Negative for congestion and sore throat.    Eyes: Negative for blurred vision and photophobia.   Respiratory: Negative for cough, sputum production and shortness of breath.    Cardiovascular: Negative for chest pain, palpitations and leg swelling.   Gastrointestinal: Negative for abdominal pain, blood in stool, constipation, diarrhea, nausea and vomiting.   Genitourinary: Negative for frequency, hematuria and urgency.   Musculoskeletal: " Negative for joint pain and myalgias.        R knee steri-strips over sight where knee was drained   Skin: Negative for itching and rash.   Neurological: Negative for dizziness, focal weakness, seizures and headaches.   Endo/Heme/Allergies: Negative for polydipsia. Does not bruise/bleed easily.   Psychiatric/Behavioral: Negative for hallucinations and memory loss. The patient is not nervous/anxious.        PAST HISTORY:     Past Medical History:   Diagnosis Date    AMD (age-related macular degeneration), bilateral 6/10/2016    Cataract     Coronary artery disease     Diabetes mellitus, type 2     Hyperlipidemia     Hypertension     Hyperthyroidism 8/20/2015    Osteoarthritis     Peripheral arterial disease 4/3/2013       Past Surgical History:   Procedure Laterality Date    ABDOMINAL SURGERY      APPENDECTOMY      CARDIAC SURGERY  2008    CABG    CATARACT EXTRACTION Right     CATARACT EXTRACTION W/  INTRAOCULAR LENS IMPLANT  8/13/2015    Dr. Blount ( [facility])    EYE SURGERY  2011    Rt eye cataract    HERNIA REPAIR      HYSTERECTOMY      HYSTEROTOMY N/A 33 yrs old       Family History   Problem Relation Age of Onset    Diabetes Mother     Coronary artery disease Father     Cataracts Father     Heart disease Brother     Cataracts Sister     No Known Problems Maternal Aunt     No Known Problems Maternal Uncle     No Known Problems Paternal Aunt     No Known Problems Paternal Uncle     No Known Problems Maternal Grandmother     No Known Problems Maternal Grandfather     No Known Problems Paternal Grandmother     No Known Problems Paternal Grandfather     Amblyopia Neg Hx     Blindness Neg Hx     Cancer Neg Hx     Glaucoma Neg Hx     Hypertension Neg Hx     Macular degeneration Neg Hx     Retinal detachment Neg Hx     Strabismus Neg Hx     Stroke Neg Hx     Thyroid disease Neg Hx        Social History     Social History    Marital status:      Spouse name: N/A    Number  of children: N/A    Years of education: N/A     Social History Main Topics    Smoking status: Current Every Day Smoker     Packs/day: 1.00    Smokeless tobacco: Not on file    Alcohol use No    Drug use: No    Sexual activity: Not on file     Other Topics Concern    Not on file     Social History Narrative       MEDICATIONS & ALLERGIES:     Current Outpatient Prescriptions on File Prior to Visit   Medication Sig Dispense Refill    ACCU-CHEK SMARTVIEW TEST STRIP Strp       ALCOHOL PREP PADS PadM       aspirin 81 MG Chew Take 81 mg by mouth once daily.      cetirizine (ZYRTEC) 10 MG tablet TAKE 1 TABLET (10 MG TOTAL) BY MOUTH DAILY AS NEEDED FOR ALLERGIES OR RHINITIS.  5    cilostazol (PLETAL) 50 MG Tab Take 1 tablet (50 mg total) by mouth once daily. 90 tablet 1    ciprofloxacin HCl (CIPRO) 500 MG tablet Take 1 tablet (500 mg total) by mouth every 12 (twelve) hours. 56 tablet 0    dextrose 5 % SolP 250 mL with vancomycin 1,000 mg SolR 1,250 mg Inject 1,250 mg into the vein once daily.      gabapentin (NEURONTIN) 300 MG capsule Take 1 capsule (300 mg total) by mouth every evening. 30 capsule 11    glipiZIDE (GLUCOTROL) 10 MG tablet TAKE TWO PILLS BY MOUTH EVERY MORNING AND ONE PILL BY MOUTH EVERY EVENING. 270 tablet 1    lancets 30 gauge Misc       lancing device Misc       lidocaine-prilocaine (EMLA) cream Apply topically as needed. To left shoulder 30 g 0    metformin (GLUCOPHAGE) 500 MG tablet Take 1 tablet (500 mg total) by mouth 2 (two) times daily with meals. 180 tablet 1    metoprolol tartrate (LOPRESSOR) 50 MG tablet Take 1 tablet (50 mg total) by mouth once daily. 90 tablet 1    propylthiouracil (PTU) 50 mg Tab Take 2 tablets (100 mg total) by mouth every 12 (twelve) hours. 60 tablet 0    ramipril (ALTACE) 5 MG capsule Take 1 capsule (5 mg total) by mouth once daily. 90 capsule 1    fluticasone (FLONASE) 50 mcg/actuation nasal spray USE 1 SPRAY BY EACH NARE ROUTE ONCE DAILY.  5     hydrOXYzine (ATARAX) 25 MG tablet TAKE ONE OR TWO TABLETS EVERY 4-6 HOURS AS NEEDED FOR ITCHING. THIS MAY MAKE YOU MORE PRONE TO FALLING. 30 tablet 0    nitroGLYCERIN (NITROSTAT) 0.4 MG SL tablet Place 1 tablet (0.4 mg total) under the tongue every 5 (five) minutes as needed for Chest pain. 1 Tablet, Sublingual Sublingual as needed .  as directed 90 tablet 0     No current facility-administered medications on file prior to visit.         Review of patient's allergies indicates:   Allergen Reactions    Pravastatin Other (See Comments)     Muscle pain       OBJECTIVE:     Vital Signs:  Vitals:    04/27/17 1425   BP: (!) 124/50   Pulse: 72   Temp: 97.9 °F (36.6 °C)     Wt Readings from Last 1 Encounters:   04/27/17 1425 54.9 kg (121 lb 0.5 oz)     Body mass index is 22.14 kg/(m^2).       Physical Exam:  Physical Exam   Constitutional: She is oriented to person, place, and time. No distress.   HENT:   Head: Normocephalic.   Eyes: Right eye exhibits no discharge. Left eye exhibits no discharge. No scleral icterus.   Neck: Normal range of motion. No tracheal deviation present. No thyromegaly present.   Cardiovascular: Normal rate and regular rhythm.    Pulmonary/Chest: Effort normal and breath sounds normal.   Abdominal: Soft. Bowel sounds are normal.   Musculoskeletal: Normal range of motion. She exhibits tenderness. She exhibits no edema or deformity.   Neurological: She is alert and oriented to person, place, and time.   Skin: Skin is warm and dry. She is not diaphoretic.   Psychiatric: Affect normal.         Laboratory  Lab Results   Component Value Date    WBC 5.49 04/24/2017    HGB 9.1 (L) 04/24/2017    HCT 28.0 (L) 04/24/2017    MCV 83 04/24/2017     04/24/2017     BMP  Lab Results   Component Value Date     04/24/2017    K 3.5 04/24/2017     04/24/2017    CO2 26 04/24/2017    BUN 31 (H) 04/24/2017    CREATININE 0.9 04/24/2017    CALCIUM 8.8 04/24/2017    ANIONGAP 7 (L) 04/24/2017     ESTGFRAFRICA >60 04/24/2017    EGFRNONAA >60 04/24/2017     Lab Results   Component Value Date    ALT 8 (L) 04/24/2017    AST 13 04/24/2017    ALKPHOS 66 04/24/2017    BILITOT 0.5 04/24/2017     Lab Results   Component Value Date    INR 1.0 04/20/2017    INR 1.0 01/04/2011    INR 1.0 04/28/2010     Lab Results   Component Value Date    HGBA1C 6.4 (H) 04/21/2017     Recent Labs      04/24/17   1709   POCTGLUCOSE  162*       Diagnostic Results:  Imaging Results     None            TRANSITION OF CARE:     [facility] Contact Note: Date: 4/24/17    Family and/or Caretaker present at visit?  Yes.  Diagnostic tests reviewed/disposition: I have reviewed all completed as well as pending diagnostic tests at the time of discharge.  Disease/illness education: pain threshold; PICC line treatment  Home health/community services discussion/referrals: Patient does not have home health established from hospital visit.  They do not need home health.  If needed, we will set up home health for the patient.   Establishment or re-establishment of referral orders for community resources: No other necessary community resources.   Discussion with other health care providers: No discussion with other health care providers necessary.     Medications Reconciliation:   I have reconciled the patient's home medications and discharge medications with the patient/family. I have updated all changes.  Refer to After-Visit Medication List.    ASSESSMENT & PLAN:     HIGH RISK CONDITION(S):  Patient is currently on drug therapy requiring intensive monitoring for toxicity: Vancomycin      ASSESSMENT AND  PLAN:      Controlled type 2 diabetes mellitus with diabetic nephropathy, without long-term current use of insulin    Essential hypertension    Hyperthyroidism    Arthritis of right knee due to other bacteria    Other orders  -     ibuprofen (ADVIL,MOTRIN) 800 MG tablet; Take 1 tablet (800 mg total) by mouth every 6 (six) hours as needed for Pain.   Dispense: 15 tablet; Refill: 0      Septic arthritis: knee improving well; denies pain in the knee during clinic visit; afebrile; vitals stable; she is getting Vanc & Cipro; the Vanc is IV will require 28 days per Dr. Patricio's hospital note; she has had 7 days to date will require 20 more days; [facility] providing infusion services; RUE PICC line intact; we will follow along until ABX complete and PICC removed. Vanc levels, CBC and TSH's drawn by home health, verified by our office--->[facility] as it is not clear on her H/H orders    HTN: BP with good control continue current    Hyperthyroidism: continue PTU - followed by Chastity Tyson, appointment on 5/9/17    DMII: reports sometimes a little high at home since being in the hospital, all under 200's - likely related to healing and infection - will continue to monitor; takes glipizide 10 mg - HgA1c on the 21st = 6.4,     Pain R knee: related to above; Ibuprofen 800 mg PRN pain, denies history of ulcers or GI bleed          Instructions for the patient:  Dispo: RTC 2 weeks, continue home health until ABX completed, follow along until ABX course completed & R PICC is removed      Scheduled Follow-up :  Future Appointments  Date Time Provider Department Center   5/9/2017 12:00 PM Chastity Tyson MD [facility]   5/11/2017 3:30 PM Varsha Sun PA-C [facility]       After Visit Medication List :     Medication List          This list is accurate as of: 4/27/17 11:59 PM.  Always use your most recent med list.                     ACCU-CHEK SMARTVIEW TEST STRIP Strp   Generic drug:  blood sugar diagnostic       ALCOHOL PREP PADS Padm   Generic drug:  alcohol swabs       aspirin 81 MG Chew       cetirizine 10 MG tablet   Commonly known as:  ZYRTEC       cilostazol 50 MG Tab   Commonly known as:  PLETAL   Take 1 tablet (50 mg total) by mouth once daily.       ciprofloxacin HCl 500 MG tablet   Commonly known as:  CIPRO   Take 1 tablet (500 mg  total) by mouth every 12 (twelve) hours.       dextrose 5 % SolP 250 mL with vancomycin 1,000 mg SolR 1,250 mg   Inject 1,250 mg into the vein once daily.       fluticasone 50 mcg/actuation nasal spray   Commonly known as:  FLONASE       gabapentin 300 MG capsule   Commonly known as:  NEURONTIN   Take 1 capsule (300 mg total) by mouth every evening.       glipiZIDE 10 MG tablet   Commonly known as:  GLUCOTROL   TAKE TWO PILLS BY MOUTH EVERY MORNING AND ONE PILL BY MOUTH EVERY EVENING.       hydrOXYzine HCl 25 MG tablet   Commonly known as:  ATARAX   TAKE ONE OR TWO TABLETS EVERY 4-6 HOURS AS NEEDED FOR ITCHING. THIS MAY MAKE YOU MORE PRONE TO FALLING.       ibuprofen 800 MG tablet   Commonly known as:  ADVIL,MOTRIN   Take 1 tablet (800 mg total) by mouth every 6 (six) hours as needed for Pain.       lancets [facility]       lancing device Misc       lidocaine-prilocaine cream   Commonly known as:  EMLA   Apply topically as needed. To left shoulder       metformin 500 MG tablet   Commonly known as:  GLUCOPHAGE   Take 1 tablet (500 mg total) by mouth 2 (two) times daily with meals.       metoprolol tartrate 50 MG tablet   Commonly known as:  LOPRESSOR   Take 1 tablet (50 mg total) by mouth once daily.       nitroGLYCERIN 0.4 MG SL tablet   Commonly known as:  NITROSTAT   Place 1 tablet (0.4 mg total) under the tongue every 5 (five) minutes as needed for Chest pain. 1 Tablet, Sublingual Sublingual as needed .  as directed       propylthiouracil 50 mg Tab   Commonly known as:  PTU   Take 2 tablets (100 mg total) by mouth every 12 (twelve) hours.       ramipril 5 MG capsule   Commonly known as:  ALTACE   Take 1 capsule (5 mg total) by mouth once daily.            Where to Get Your Medications      You can get these medications from any pharmacy     Bring a paper prescription for each of these medications     ibuprofen 800 MG tablet                   RADHA Torres, ANGELP-C  PA# [id number]  NPI#  [id number]  Hospitalist - Department of Hospital Medicine  [address] [place] [address]  Office [phone]; Pager [phone]

## 2017-04-27 NOTE — MR AVS SNAPSHOT
[facility] [address] [facility] [address]  [place] [address]  Phone: [phone]  Fax: [phone]                   Drew Kendrick   2017 2:00 PM   Office Visit    Description:  Female : 1940   Provider:  [facility]   Department:  [facility]           Diagnoses this Visit        Comments    Controlled type 2 diabetes mellitus with diabetic nephropathy, without long-term current use of insulin    -  Primary     Essential hypertension         Hyperthyroidism                To Do List           Future Appointments        Provider Department Dept Phone    2017 1:30 PM Varsha Sun PA-C [facility] [phone]      Goals (5 Years of Data)              17    COMPLETED: HDL > 40     57      Related Problems    Combined hyperlipidemia associated with type 2 diabetes mellitus    HEMOGLOBIN A1C < 7.5   6.4  6.2  5.9    Related Problems    Diabetes mellitus type 2, controlled    COMPLETED: LDL CHOLESTEROL < 100     92.4      Related Problems    Combined hyperlipidemia associated with type 2 diabetes mellitus    Quit smoking / using tobacco       Not on track    Related Problems    Tobacco use    Notes - Note created  12/10/2013 11:44 AM by FELICITAS Thomas MD    Try switching to the electronic cigarette as a way to quit smoking.         These Medications        Disp Refills Start End    ibuprofen (ADVIL,MOTRIN) 800 MG tablet 15 tablet 0 2017     Take 1 tablet (800 mg total) by mouth every 6 (six) hours as needed for Pain. - Oral    Pharmacy: [facility]/pharmacy #[id number] [address] Ph #: [phone]         [facility]     [facility] - / Assistance  Unless otherwise directed by your provider, please contact [facility], our nurse care line that is available for / assistance.     Registered nurses in the [facility] provide: appointment  scheduling, clinical advisement, health education, and other advisory services.  Call: [phone] (toll free)               Medications           Message regarding Medications     Verify the changes and/or additions to your medication regime listed below are the same as discussed with your clinician today.  If any of these changes or additions are incorrect, please notify your healthcare provider.        START taking these NEW medications        Refills    ibuprofen (ADVIL,MOTRIN) 800 MG tablet 0    Sig: Take 1 tablet (800 mg total) by mouth every 6 (six) hours as needed for Pain.    Class: Print    Route: Oral      STOP taking these medications     diclofenac (VOLTAREN) 50 MG EC tablet Take 50 mg by mouth 3 (three) times daily as needed.           Verify that the below list of medications is an accurate representation of the medications you are currently taking.  If none reported, the list may be blank. If incorrect, please contact your healthcare provider. Carry this list with you in case of emergency.           Current Medications     ACCU-CHEK SMARTVIEW TEST STRIP Strp     ALCOHOL PREP PADS PadM     aspirin 81 MG Chew Take 81 mg by mouth once daily.    cetirizine (ZYRTEC) 10 MG tablet TAKE 1 TABLET (10 MG TOTAL) BY MOUTH DAILY AS NEEDED FOR ALLERGIES OR RHINITIS.    cilostazol (PLETAL) 50 MG Tab Take 1 tablet (50 mg total) by mouth once daily.    ciprofloxacin HCl (CIPRO) 500 MG tablet Take 1 tablet (500 mg total) by mouth every 12 (twelve) hours.    dextrose 5 % SolP 250 mL with vancomycin 1,000 mg SolR 1,250 mg Inject 1,250 mg into the vein once daily.    gabapentin (NEURONTIN) 300 MG capsule Take 1 capsule (300 mg total) by mouth every evening.    glipiZIDE (GLUCOTROL) 10 MG tablet TAKE TWO PILLS BY MOUTH EVERY MORNING AND ONE PILL BY MOUTH EVERY EVENING.    lancets 30 [facility]     lancing device Misc     lidocaine-prilocaine (EMLA) cream Apply topically as needed. To left shoulder    metformin  (GLUCOPHAGE) 500 MG tablet Take 1 tablet (500 mg total) by mouth 2 (two) times daily with meals.    metoprolol tartrate (LOPRESSOR) 50 MG tablet Take 1 tablet (50 mg total) by mouth once daily.    propylthiouracil (PTU) 50 mg Tab Take 2 tablets (100 mg total) by mouth every 12 (twelve) hours.    ramipril (ALTACE) 5 MG capsule Take 1 capsule (5 mg total) by mouth once daily.    fluticasone (FLONASE) 50 mcg/actuation nasal spray USE 1 SPRAY BY EACH NARE ROUTE ONCE DAILY.    hydrOXYzine (ATARAX) 25 MG tablet TAKE ONE OR TWO TABLETS EVERY 4-6 HOURS AS NEEDED FOR ITCHING. THIS MAY MAKE YOU MORE PRONE TO FALLING.    ibuprofen (ADVIL,MOTRIN) 800 MG tablet Take 1 tablet (800 mg total) by mouth every 6 (six) hours as needed for Pain.    nitroGLYCERIN (NITROSTAT) 0.4 MG SL tablet Place 1 tablet (0.4 mg total) under the tongue every 5 (five) minutes as needed for Chest pain. 1 Tablet, Sublingual Sublingual as needed .  as directed           Clinical Reference Information           Your Vitals Were     BP Pulse Temp Weight SpO2 BMI    124/50 (BP Location: Right arm, Patient Position: Sitting, BP Method: Manual) 72 97.9 °F (36.6 °C) (Oral) 54.9 kg (121 lb 0.5 oz) 98% 22.14 kg/m2      Blood Pressure          Most Recent Value    BP  (!)  124/50      Allergies as of 4/27/2017     Pravastatin      Immunizations Administered on Date of Encounter - 4/27/2017     None      Smoking Cessation     If you would like to quit smoking:   You may be eligible for free services if you are a [place] resident and started smoking cigarettes before September 1, 1988.  Call the Smoking Cessation Trust ([facility]) toll free at ([phone] or (282) [phone].   Call 1-800-QUIT-NOW if you do not meet the above criteria.   Contact us via email: [email or web address]   View our website for more information: [email or web address]        Language Assistance Services     ATTENTION: Language assistance services are available, free of charge. Please  call [phone].      ATENCIÓN: Si habla español, tiene a grayson disposición servicios gratuitos de asistencia lingüística. Llame al [phone].     CHÚ Ý: N?u b?n nói Ti?ng Vi?t, có các d?ch v? h? tr? ngôn ng? mi?n phí dành cho b?n. G?i s? [phone].         [facility] complies with applicable Federal civil rights laws and does not discriminate on the basis of race, color, national origin, age, disability, or sex.

## 2017-04-28 ENCOUNTER — TELEPHONE (OUTPATIENT)
Dept: PRIMARY CARE CLINIC | Facility: CLINIC | Age: 77
End: 2017-04-28

## 2017-04-28 NOTE — TELEPHONE ENCOUNTER
I spoke with Yanely at [facility] and she states she has orders as follows:  CBC biweekly  Vanc trough on Mondays  Serum Cr on Thursdays  CMP, ESR, CRP weekly  I informed Leyla Lopes NP

## 2017-05-01 ENCOUNTER — TELEPHONE (OUTPATIENT)
Dept: ADMINISTRATIVE | Facility: CLINIC | Age: 77
End: 2017-05-01

## 2017-05-01 ENCOUNTER — LAB VISIT (OUTPATIENT)
Dept: LAB | Facility: HOSPITAL | Age: 77
End: 2017-05-01
Attending: INTERNAL MEDICINE
Payer: MEDICARE

## 2017-05-01 ENCOUNTER — TELEPHONE (OUTPATIENT)
Dept: FAMILY MEDICINE | Facility: CLINIC | Age: 77
End: 2017-05-01

## 2017-05-01 DIAGNOSIS — A41.9 SEPTICEMIC: Primary | ICD-10-CM

## 2017-05-01 LAB
ALBUMIN SERPL BCP-MCNC: 2.8 G/DL
ALP SERPL-CCNC: 78 U/L
ALT SERPL W/O P-5'-P-CCNC: 12 U/L
ANION GAP SERPL CALC-SCNC: 9 MMOL/L
AST SERPL-CCNC: 22 U/L
BASOPHILS # BLD AUTO: 0.04 K/UL
BASOPHILS NFR BLD: 0.4 %
BILIRUB SERPL-MCNC: 0.3 MG/DL
BUN SERPL-MCNC: 18 MG/DL
CALCIUM SERPL-MCNC: 9.2 MG/DL
CHLORIDE SERPL-SCNC: 105 MMOL/L
CO2 SERPL-SCNC: 27 MMOL/L
CREAT SERPL-MCNC: 1 MG/DL
CRP SERPL-MCNC: 33.8 MG/L
DIFFERENTIAL METHOD: ABNORMAL
EOSINOPHIL # BLD AUTO: 0.1 K/UL
EOSINOPHIL NFR BLD: 0.8 %
ERYTHROCYTE [DISTWIDTH] IN BLOOD BY AUTOMATED COUNT: 14.9 %
ERYTHROCYTE [SEDIMENTATION RATE] IN BLOOD BY WESTERGREN METHOD: 61 MM/HR
EST. GFR  (AFRICAN AMERICAN): >60 ML/MIN/1.73 M^2
EST. GFR  (NON AFRICAN AMERICAN): 54 ML/MIN/1.73 M^2
GLUCOSE SERPL-MCNC: 202 MG/DL
HCT VFR BLD AUTO: 34.9 %
HGB BLD-MCNC: 11.2 G/DL
LYMPHOCYTES # BLD AUTO: 1.8 K/UL
LYMPHOCYTES NFR BLD: 16.4 %
MCH RBC QN AUTO: 26.9 PG
MCHC RBC AUTO-ENTMCNC: 32.1 %
MCV RBC AUTO: 84 FL
MONOCYTES # BLD AUTO: 0.7 K/UL
MONOCYTES NFR BLD: 6 %
NEUTROPHILS # BLD AUTO: 8.3 K/UL
NEUTROPHILS NFR BLD: 76.2 %
PLATELET # BLD AUTO: 381 K/UL
PMV BLD AUTO: 10.5 FL
POTASSIUM SERPL-SCNC: 5 MMOL/L
PROT SERPL-MCNC: 6.2 G/DL
RBC # BLD AUTO: 4.16 M/UL
SODIUM SERPL-SCNC: 141 MMOL/L
VANCOMYCIN SERPL-MCNC: 25.6 UG/ML
WBC # BLD AUTO: 10.85 K/UL

## 2017-05-01 PROCEDURE — 80202 ASSAY OF VANCOMYCIN: CPT

## 2017-05-01 PROCEDURE — 36415 COLL VENOUS BLD VENIPUNCTURE: CPT

## 2017-05-01 PROCEDURE — 85025 COMPLETE CBC W/AUTO DIFF WBC: CPT

## 2017-05-01 PROCEDURE — 85651 RBC SED RATE NONAUTOMATED: CPT

## 2017-05-01 PROCEDURE — 86140 C-REACTIVE PROTEIN: CPT

## 2017-05-01 PROCEDURE — 80053 COMPREHEN METABOLIC PANEL: CPT

## 2017-05-01 NOTE — TELEPHONE ENCOUNTER
[facility] nurse Keely called to report patient having low blood sugar at 73 including evenings. Patient does not eat full meals thru out the day and diet is controlled by family members. Dr. Marquis advised to hold the glipizide. [facility].

## 2017-05-03 ENCOUNTER — LAB VISIT (OUTPATIENT)
Dept: LAB | Facility: HOSPITAL | Age: 77
End: 2017-05-03
Attending: INTERNAL MEDICINE
Payer: MEDICARE

## 2017-05-03 DIAGNOSIS — T81.44XA POSTOPERATIVE SEPTICEMIA: Primary | ICD-10-CM

## 2017-05-03 LAB
BASOPHILS # BLD AUTO: 0.04 K/UL
BASOPHILS NFR BLD: 0.6 %
CREAT SERPL-MCNC: 0.9 MG/DL
DIFFERENTIAL METHOD: ABNORMAL
EOSINOPHIL # BLD AUTO: 0.2 K/UL
EOSINOPHIL NFR BLD: 2.7 %
ERYTHROCYTE [DISTWIDTH] IN BLOOD BY AUTOMATED COUNT: 14.7 %
EST. GFR  (AFRICAN AMERICAN): >60 ML/MIN/1.73 M^2
EST. GFR  (NON AFRICAN AMERICAN): >60 ML/MIN/1.73 M^2
HCT VFR BLD AUTO: 40.4 %
HGB BLD-MCNC: 13.1 G/DL
LYMPHOCYTES # BLD AUTO: 1.6 K/UL
LYMPHOCYTES NFR BLD: 23.2 %
MCH RBC QN AUTO: 27.3 PG
MCHC RBC AUTO-ENTMCNC: 32.4 %
MCV RBC AUTO: 84 FL
MONOCYTES # BLD AUTO: 0.4 K/UL
MONOCYTES NFR BLD: 5.1 %
NEUTROPHILS # BLD AUTO: 4.8 K/UL
NEUTROPHILS NFR BLD: 68.4 %
PLATELET # BLD AUTO: 313 K/UL
PMV BLD AUTO: 10.1 FL
RBC # BLD AUTO: 4.79 M/UL
VANCOMYCIN TROUGH SERPL-MCNC: 16.8 UG/ML
WBC # BLD AUTO: 7.06 K/UL

## 2017-05-03 PROCEDURE — 80202 ASSAY OF VANCOMYCIN: CPT

## 2017-05-03 PROCEDURE — 85025 COMPLETE CBC W/AUTO DIFF WBC: CPT

## 2017-05-03 PROCEDURE — 82565 ASSAY OF CREATININE: CPT

## 2017-05-03 PROCEDURE — 36415 COLL VENOUS BLD VENIPUNCTURE: CPT

## 2017-05-05 RX ORDER — LIDOCAINE AND PRILOCAINE 25; 25 MG/G; MG/G
CREAM TOPICAL
Qty: 30 G | Refills: 0 | Status: SHIPPED | OUTPATIENT
Start: 2017-05-05 | End: 2017-08-30 | Stop reason: SDUPTHER

## 2017-05-08 ENCOUNTER — LAB VISIT (OUTPATIENT)
Dept: LAB | Facility: HOSPITAL | Age: 77
End: 2017-05-08
Attending: INTERNAL MEDICINE
Payer: MEDICARE

## 2017-05-08 DIAGNOSIS — T81.44XA POSTOPERATIVE SEPTICEMIA: Primary | ICD-10-CM

## 2017-05-08 LAB
ALBUMIN SERPL BCP-MCNC: 3 G/DL
ALP SERPL-CCNC: 89 U/L
ALT SERPL W/O P-5'-P-CCNC: 8 U/L
ANION GAP SERPL CALC-SCNC: 11 MMOL/L
AST SERPL-CCNC: 19 U/L
BILIRUB SERPL-MCNC: 0.5 MG/DL
BUN SERPL-MCNC: 17 MG/DL
CALCIUM SERPL-MCNC: 9.2 MG/DL
CHLORIDE SERPL-SCNC: 103 MMOL/L
CO2 SERPL-SCNC: 26 MMOL/L
CREAT SERPL-MCNC: 0.9 MG/DL
CRP SERPL-MCNC: 69.4 MG/L
EST. GFR  (AFRICAN AMERICAN): >60 ML/MIN/1.73 M^2
EST. GFR  (NON AFRICAN AMERICAN): >60 ML/MIN/1.73 M^2
GLUCOSE SERPL-MCNC: 115 MG/DL
POTASSIUM SERPL-SCNC: 4.2 MMOL/L
PROT SERPL-MCNC: 6.6 G/DL
SODIUM SERPL-SCNC: 140 MMOL/L
VANCOMYCIN TROUGH SERPL-MCNC: 16.2 UG/ML

## 2017-05-08 PROCEDURE — 80053 COMPREHEN METABOLIC PANEL: CPT

## 2017-05-08 PROCEDURE — 86140 C-REACTIVE PROTEIN: CPT

## 2017-05-08 PROCEDURE — 80202 ASSAY OF VANCOMYCIN: CPT

## 2017-05-08 PROCEDURE — 36415 COLL VENOUS BLD VENIPUNCTURE: CPT

## 2017-05-10 ENCOUNTER — HOSPITAL ENCOUNTER (EMERGENCY)
Facility: HOSPITAL | Age: 77
Discharge: HOME | End: 2017-05-10
Attending: EMERGENCY MEDICINE
Payer: MEDICARE

## 2017-05-10 VITALS
OXYGEN SATURATION: 97 % | RESPIRATION RATE: 16 BRPM | TEMPERATURE: 98 F | SYSTOLIC BLOOD PRESSURE: 171 MMHG | HEIGHT: 62 IN | HEART RATE: 87 BPM | DIASTOLIC BLOOD PRESSURE: 72 MMHG | WEIGHT: 120 LBS | BODY MASS INDEX: 22.08 KG/M2

## 2017-05-10 DIAGNOSIS — R60.0 ARM EDEMA: Primary | ICD-10-CM

## 2017-05-10 PROCEDURE — 99284 EMERGENCY DEPT VISIT MOD MDM: CPT | Mod: 25

## 2017-05-10 PROCEDURE — 63600175 PHARM REV CODE 636 W HCPCS: Performed by: EMERGENCY MEDICINE

## 2017-05-10 PROCEDURE — 96372 THER/PROPH/DIAG INJ SC/IM: CPT

## 2017-05-10 RX ORDER — HYDROCODONE BITARTRATE AND ACETAMINOPHEN 5; 325 MG/1; MG/1
1 TABLET ORAL EVERY 4 HOURS PRN
Qty: 10 TABLET | Refills: 0 | Status: SHIPPED | OUTPATIENT
Start: 2017-05-10 | End: 2017-05-20

## 2017-05-10 RX ORDER — HYDROMORPHONE HYDROCHLORIDE 2 MG/ML
0.5 INJECTION, SOLUTION INTRAMUSCULAR; INTRAVENOUS; SUBCUTANEOUS
Status: COMPLETED | OUTPATIENT
Start: 2017-05-10 | End: 2017-05-10

## 2017-05-10 RX ADMIN — HYDROMORPHONE HYDROCHLORIDE 0.5 MG: 2 INJECTION INTRAMUSCULAR; INTRAVENOUS; SUBCUTANEOUS at 08:05

## 2017-05-10 RX ADMIN — HYDROMORPHONE HYDROCHLORIDE 0.5 MG: 2 INJECTION INTRAMUSCULAR; INTRAVENOUS; SUBCUTANEOUS at 10:05

## 2017-05-10 NOTE — ED AVS SNAPSHOT
[facility]  [place] [address]                Drew Kendrick   5/10/2017  8:17 PM   ED    Description:  Female : 1940   Department:  [facility]           Your Care was Coordinated By:     Provider Role From To    Himanshu Rao MD Attending Provider 05/10/17 2021 --    Mauro Isidro PA-C Physician Assistant 05/10/17 2021 05/10/17 2026      Reason for Visit     Arm Pain           Diagnoses this Visit        Comments    Arm edema    -  Primary       ED Disposition     ED Disposition Condition Comment    Discharge  There is no evidence of clot, infection, or injury.    Return immediately for development of fever or chills or worsening swelling.    Our goal in the emergency department is to always give you outstanding care and exceptional service. You may receive a  survey by mail or e-mail in the next week regarding your experience in our ED. We would greatly appreciate your completing and returning the survey. Your feedback provides us with a way to recognize our staff who give very good care and it helps us learn  how to improve when your experience was below our aspiration of excellence.              To Do List           Follow-up Information     Follow up with [facility].    Specialty:  Emergency Medicine    Why:  If symptoms worsen    Contact information:    [place] [address] [phone]        Follow up with Aimee Marquis MD.    Specialty:  Family Medicine    Why:  As needed    Contact information:    [address] [place] [address] [phone]         These Medications        Disp Refills Start End    hydrocodone-acetaminophen 5-325mg (NORCO) 5-325 mg per tablet 10 tablet 0 5/10/2017 2017    Take 1 tablet by mouth every 4 (four) hours as needed for Pain. - Oral    Pharmacy: [id number] [place] - [facility] #: [phone]          [facility]     [facility] Care [facility] - 24/7 Assistance  Unless otherwise directed by your provider, please contact [facility], our nurse care line that is available for 24/7 assistance.     Registered nurses in the [facility] provide: appointment scheduling, clinical advisement, health education, and other advisory services.  Call: [phone] (toll free)               Medications           Message regarding Medications     Verify the changes and/or additions to your medication regime listed below are the same as discussed with your clinician today.  If any of these changes or additions are incorrect, please notify your healthcare provider.        START taking these NEW medications        Refills    hydrocodone-acetaminophen 5-325mg (NORCO) 5-325 mg per tablet 0    Sig: Take 1 tablet by mouth every 4 (four) hours as needed for Pain.    Class: Print    Route: Oral      These medications were administered today        Dose Freq    hydromorphone (PF) injection 0.5 mg 0.5 mg ED 1 Time    Sig: Inject 0.25 mLs (0.5 mg total) into the muscle ED 1 Time.    Class: Normal    Route: Intramuscular    hydromorphone (PF) injection 0.5 mg 0.5 mg ED 1 Time    Sig: Inject 0.25 mLs (0.5 mg total) into the muscle ED 1 Time.    Class: Normal    Route: Intramuscular      STOP taking these medications     glipiZIDE (GLUCOTROL) 10 MG tablet TAKE TWO PILLS BY MOUTH EVERY MORNING AND ONE PILL BY MOUTH EVERY EVENING.    hydrOXYzine (ATARAX) 25 MG tablet TAKE ONE OR TWO TABLETS EVERY 4-6 HOURS AS NEEDED FOR ITCHING. THIS MAY MAKE YOU MORE PRONE TO FALLING.           Verify that the below list of medications is an accurate representation of the medications you are currently taking.  If none reported, the list may be blank. If incorrect, please contact your healthcare provider. Carry this list with you in case of emergency.           Current Medications     aspirin 81 MG Chew Take 81 mg by mouth once daily.  "   cilostazol (PLETAL) 50 MG Tab Take 1 tablet (50 mg total) by mouth once daily.    ciprofloxacin HCl (CIPRO) 500 MG tablet Take 1 tablet (500 mg total) by mouth every 12 (twelve) hours.    dextrose 5 % SolP 250 mL with vancomycin 1,000 mg SolR 1,250 mg Inject 1,250 mg into the vein once daily.    gabapentin (NEURONTIN) 300 MG capsule Take 1 capsule (300 mg total) by mouth every evening.    metformin (GLUCOPHAGE) 500 MG tablet Take 1 tablet (500 mg total) by mouth 2 (two) times daily with meals.    metoprolol tartrate (LOPRESSOR) 50 MG tablet Take 1 tablet (50 mg total) by mouth once daily.    propylthiouracil (PTU) 50 mg Tab Take 2 tablets (100 mg total) by mouth every 12 (twelve) hours.    ramipril (ALTACE) 5 MG capsule Take 1 capsule (5 mg total) by mouth once daily.    ACCU-CHEK SMARTVIEW TEST STRIP Strp     ALCOHOL PREP PADS PadM     cetirizine (ZYRTEC) 10 MG tablet TAKE 1 TABLET (10 MG TOTAL) BY MOUTH DAILY AS NEEDED FOR ALLERGIES OR RHINITIS.    fluticasone (FLONASE) 50 mcg/actuation nasal spray USE 1 SPRAY BY EACH NARE ROUTE ONCE DAILY.    hydrocodone-acetaminophen 5-325mg (NORCO) 5-325 mg per tablet Take 1 tablet by mouth every 4 (four) hours as needed for Pain.    ibuprofen (ADVIL,MOTRIN) 800 MG tablet Take 1 tablet (800 mg total) by mouth every 6 (six) hours as needed for Pain.    lancets 30 gauge Misc     lancing device Misc     lidocaine-prilocaine (EMLA) cream Apply topically as needed. To left shoulder    nitroGLYCERIN (NITROSTAT) 0.4 MG SL tablet Place 1 tablet (0.4 mg total) under the tongue every 5 (five) minutes as needed for Chest pain. 1 Tablet, Sublingual Sublingual as needed .  as directed           Clinical Reference Information           Your Vitals Were     BP Pulse Temp Resp Height Weight    171/72 (BP Location: Left arm, Patient Position: Sitting, BP Method: Automatic) 87 98.4 °F (36.9 °C) (Oral) 16 5' 2" (1.575 m) 54.4 kg (120 lb)    SpO2 BMI             97% 21.95 kg/m2       "   Allergies as of 5/10/2017        Reactions    Pravastatin Other (See Comments)    Muscle pain      Immunizations Administered on Date of Encounter - 5/10/2017     None      ED Micro, Lab, POCT     None      ED Imaging Orders     Start Ordered       Status Ordering Provider    05/10/17 2033 05/10/17 2034  US Upper Extremity Veins Right  1 time imaging      Final result         Discharge Instructions             Your Scheduled Appointments     May 11, 2017  3:30 PM CDT   Established Patient Visit with [facility]    [address] [facility] [address]  [facility] [address] [phone]            Jul 12, 2017 12:00 PM CDT   Established Patient Endocrinology with Chastity Tyson MD   [facility]    [address]  [facility] [id number] [phone]              Smoking Cessation     If you would like to quit smoking:   You may be eligible for free services if you are a [place] resident and started smoking cigarettes before September 1, 1988.  Call the Smoking Cessation Trust (SCT) toll free at (732) 433-3226 or ([phone] 871-4638.   Call 0-936-QUIT-NOW if you do not meet the above criteria.   Contact us via email: [email or web address]   View our website for more information: [email or web address]         [facility] complies with applicable Federal civil rights laws and does not discriminate on the basis of race, color, national origin, age, disability, or sex.        Language Assistance Services     ATTENTION: Language assistance services are available, free of charge. Please call [phone].      ATENCIÓN: Si habla español, tiene a grayson disposición servicios gratuitos de asistencia lingüística. Llame al [phone].     CHÚ Ý: N?u b?n nói Ti?ng Vi?t, có các d?ch v? h? tr? ngôn ng? mi?n phí dành cho b?n. G?i s? [phone].

## 2017-05-11 ENCOUNTER — OFFICE VISIT (OUTPATIENT)
Dept: PRIMARY CARE CLINIC | Facility: CLINIC | Age: 77
End: 2017-05-11
Payer: MEDICARE

## 2017-05-11 ENCOUNTER — HOSPITAL ENCOUNTER (OUTPATIENT)
Dept: RADIOLOGY | Facility: HOSPITAL | Age: 77
Discharge: HOME | End: 2017-05-11
Attending: INTERNAL MEDICINE
Payer: MEDICARE

## 2017-05-11 VITALS
RESPIRATION RATE: 16 BRPM | TEMPERATURE: 99 F | OXYGEN SATURATION: 98 % | WEIGHT: 121.25 LBS | SYSTOLIC BLOOD PRESSURE: 139 MMHG | BODY MASS INDEX: 22.18 KG/M2 | DIASTOLIC BLOOD PRESSURE: 65 MMHG | HEART RATE: 91 BPM

## 2017-05-11 DIAGNOSIS — E05.90 HYPERTHYROIDISM: Chronic | ICD-10-CM

## 2017-05-11 DIAGNOSIS — E11.21 CONTROLLED TYPE 2 DIABETES MELLITUS WITH DIABETIC NEPHROPATHY, WITHOUT LONG-TERM CURRENT USE OF INSULIN: Primary | Chronic | ICD-10-CM

## 2017-05-11 DIAGNOSIS — R22.31 LOCALIZED SWELLING ON RIGHT HAND: ICD-10-CM

## 2017-05-11 DIAGNOSIS — M10.9 GOUT OF RIGHT HAND, UNSPECIFIED CAUSE, UNSPECIFIED CHRONICITY: ICD-10-CM

## 2017-05-11 DIAGNOSIS — E78.2 COMBINED HYPERLIPIDEMIA ASSOCIATED WITH TYPE 2 DIABETES MELLITUS: Chronic | ICD-10-CM

## 2017-05-11 DIAGNOSIS — M00.861 ARTHRITIS OF RIGHT KNEE DUE TO OTHER BACTERIA: ICD-10-CM

## 2017-05-11 DIAGNOSIS — E11.69 COMBINED HYPERLIPIDEMIA ASSOCIATED WITH TYPE 2 DIABETES MELLITUS: Chronic | ICD-10-CM

## 2017-05-11 DIAGNOSIS — I10 ESSENTIAL HYPERTENSION: Chronic | ICD-10-CM

## 2017-05-11 PROCEDURE — 99213 OFFICE O/P EST LOW 20 MIN: CPT | Mod: S$GLB,,, | Performed by: PHYSICIAN ASSISTANT

## 2017-05-11 PROCEDURE — 99499 UNLISTED E&M SERVICE: CPT | Mod: S$GLB,,, | Performed by: PHYSICIAN ASSISTANT

## 2017-05-11 PROCEDURE — 3075F SYST BP GE 130 - 139MM HG: CPT | Mod: S$GLB,,, | Performed by: PHYSICIAN ASSISTANT

## 2017-05-11 PROCEDURE — 1125F AMNT PAIN NOTED PAIN PRSNT: CPT | Mod: S$GLB,,, | Performed by: PHYSICIAN ASSISTANT

## 2017-05-11 PROCEDURE — 73110 X-RAY EXAM OF WRIST: CPT | Mod: TC,RT

## 2017-05-11 PROCEDURE — 1159F MED LIST DOCD IN RCRD: CPT | Mod: S$GLB,,, | Performed by: PHYSICIAN ASSISTANT

## 2017-05-11 PROCEDURE — 99999 PR PBB SHADOW E&M-EST. PATIENT-LVL III: CPT | Mod: PBBFAC,,, | Performed by: PHYSICIAN ASSISTANT

## 2017-05-11 PROCEDURE — 73110 X-RAY EXAM OF WRIST: CPT | Mod: 26,RT,, | Performed by: RADIOLOGY

## 2017-05-11 PROCEDURE — 3078F DIAST BP <80 MM HG: CPT | Mod: S$GLB,,, | Performed by: PHYSICIAN ASSISTANT

## 2017-05-11 PROCEDURE — 1160F RVW MEDS BY RX/DR IN RCRD: CPT | Mod: S$GLB,,, | Performed by: PHYSICIAN ASSISTANT

## 2017-05-11 RX ORDER — COLCHICINE 0.6 MG/1
0.6 TABLET ORAL DAILY
Qty: 3 TABLET | Refills: 0 | Status: SHIPPED | OUTPATIENT
Start: 2017-05-11 | End: 2017-05-13

## 2017-05-11 NOTE — ED TRIAGE NOTES
Patient states she has a PICC line on her right arm. Patient states her lower forearm/wrist was aching last night. Patient states pain and  swelling to her right wrist/forearm started today. Patient denies fever.

## 2017-05-11 NOTE — MR AVS SNAPSHOT
[facility] [address] [facility] [address]  [place] [address]  Phone: [phone]  Fax: [phone]                   Drew Kendrick   2017 3:30 PM   Office Visit    Description:  Female : 1940   Provider:  Varsha Sun PA-C   Department:  [facility]           Reason for Visit     Follow-up           Diagnoses this Visit        Comments    Controlled type 2 diabetes mellitus with diabetic nephropathy, without long-term current use of insulin    -  Primary     Hyperthyroidism         Combined hyperlipidemia associated with type 2 diabetes mellitus         Essential hypertension         Arthritis of right knee due to other bacteria         Gout of right hand, unspecified cause, unspecified chronicity         Localized swelling on right hand                To Do List           Goals (5 Years of Data)              17    COMPLETED: HDL > 40     57      Related Problems    Combined hyperlipidemia associated with type 2 diabetes mellitus    HEMOGLOBIN A1C < 7.5   6.4  6.2  5.9    Related Problems    Diabetes mellitus type 2, controlled    COMPLETED: LDL CHOLESTEROL < 100     92.4      Related Problems    Combined hyperlipidemia associated with type 2 diabetes mellitus    Quit smoking / using tobacco       Not on track    Related Problems    Tobacco use    Notes - Note created  12/10/2013 11:44 AM by FELICITAS Thomas MD    Try switching to the electronic cigarette as a way to quit smoking.         These Medications        Disp Refills Start End    colchicine 0.6 mg tablet 3 tablet 0 2017    Take 1 tablet (0.6 mg total) by mouth once daily. Take 1.2 mg (2 pills) then .6mg one hour later - Oral    Pharmacy: [facility]/pharmacy #[id number] \[facility]"" [address] #: [phone]         [facility]     [facility] -  Assistance  Unless otherwise directed by your provider, please contact  [facility], our nurse care line that is available for 24/7 assistance.     Registered nurses in the [facility] provide: appointment scheduling, clinical advisement, health education, and other advisory services.  Call: [phone] (toll free)               Medications           Message regarding Medications     Verify the changes and/or additions to your medication regime listed below are the same as discussed with your clinician today.  If any of these changes or additions are incorrect, please notify your healthcare provider.        START taking these NEW medications        Refills    colchicine 0.6 mg tablet 0    Sig: Take 1 tablet (0.6 mg total) by mouth once daily. Take 1.2 mg (2 pills) then .6mg one hour later    Class: Normal    Route: Oral      STOP taking these medications     ramipril (ALTACE) 5 MG capsule Take 1 capsule (5 mg total) by mouth once daily.           Verify that the below list of medications is an accurate representation of the medications you are currently taking.  If none reported, the list may be blank. If incorrect, please contact your healthcare provider. Carry this list with you in case of emergency.           Current Medications     ACCU-CHEK SMARTVIEW TEST STRIP Strp     ALCOHOL PREP PADS PadM     aspirin 81 MG Chew Take 81 mg by mouth once daily.    cetirizine (ZYRTEC) 10 MG tablet TAKE 1 TABLET (10 MG TOTAL) BY MOUTH DAILY AS NEEDED FOR ALLERGIES OR RHINITIS.    cilostazol (PLETAL) 50 MG Tab Take 1 tablet (50 mg total) by mouth once daily.    ciprofloxacin HCl (CIPRO) 500 MG tablet Take 1 tablet (500 mg total) by mouth every 12 (twelve) hours.    colchicine 0.6 mg tablet Take 1 tablet (0.6 mg total) by mouth once daily. Take 1.2 mg (2 pills) then .6mg one hour later    dextrose 5 % SolP 250 mL with vancomycin 1,000 mg SolR 1,250 mg Inject 1,250 mg into the vein once daily.    fluticasone (FLONASE) 50 mcg/actuation nasal spray USE 1 SPRAY BY EACH NARE ROUTE ONCE  DAILY.    gabapentin (NEURONTIN) 300 MG capsule Take 1 capsule (300 mg total) by mouth every evening.    hydrocodone-acetaminophen 5-325mg (NORCO) 5-325 mg per tablet Take 1 tablet by mouth every 4 (four) hours as needed for Pain.    ibuprofen (ADVIL,MOTRIN) 800 MG tablet Take 1 tablet (800 mg total) by mouth every 6 (six) hours as needed for Pain.    lancets 30 gauge Misc     lancing device Misc     lidocaine-prilocaine (EMLA) cream Apply topically as needed. To left shoulder    metformin (GLUCOPHAGE) 500 MG tablet Take 1 tablet (500 mg total) by mouth 2 (two) times daily with meals.    metoprolol tartrate (LOPRESSOR) 50 MG tablet Take 1 tablet (50 mg total) by mouth once daily.    nitroGLYCERIN (NITROSTAT) 0.4 MG SL tablet Place 1 tablet (0.4 mg total) under the tongue every 5 (five) minutes as needed for Chest pain. 1 Tablet, Sublingual Sublingual as needed .  as directed    propylthiouracil (PTU) 50 mg Tab Take 2 tablets (100 mg total) by mouth every 12 (twelve) hours.           Clinical Reference Information           Your Vitals Were     BP Pulse Temp Resp Weight SpO2    139/65 (BP Location: Left arm, Patient Position: Sitting, BP Method: Automatic) 91 98.7 °F (37.1 °C) (Oral) 16 55 kg (121 lb 4.1 oz) 98%    BMI                22.18 kg/m2          Blood Pressure          Most Recent Value    BP  139/65      Allergies as of 5/11/2017     Pravastatin      Immunizations Administered on Date of Encounter - 5/11/2017     None      Orders Placed During Today's Visit     Future Labs/Procedures Expected by Expires    Uric acid  5/11/2017 7/10/2018    X-Ray Wrist Navicular Views Right  5/11/2017 5/11/2018      Smoking Cessation     If you would like to quit smoking:   You may be eligible for free services if you are a [place] resident and started smoking cigarettes before September 1, 1988.  Call the Smoking Cessation Trust (SCT) toll free at (243) 200-9150 or (750) 361-7504.   Call 8-800-QUIT-NOW if you do not  meet the above criteria.   Contact us via email: [email or web address]   View our website for more information: [email or web address]        Language Assistance Services     ATTENTION: Language assistance services are available, free of charge. Please call [phone].      ATENCIÓN: Si habla galilea, tiene a grayson disposición servicios gratuitos de asistencia lingüística. Llame al [phone].     CHÚ Ý: N?u b?n nói Ti?ng Vi?t, có các d?ch v? h? tr? ngôn ng? mi?n phí dành cho b?n. G?i s? [phone].         [facility] complies with applicable Federal civil rights laws and does not discriminate on the basis of race, color, national origin, age, disability, or sex.

## 2017-05-11 NOTE — ED PROVIDER NOTES
"Encounter Date: 5/10/2017    SCRIBE #1 NOTE: I, Gisele Caro, am scribing for, and in the presence of,  Himanshu Rao MD. I have scribed the following portions of the note - Other sections scribed: ROS, HPI.       History     Chief Complaint   Patient presents with    Arm Pain     Pt. c/o right arm pain that has PICC line inserted into it for antibiotics. Pt. reports the pain has been ongoing for several days. Has home health RN that states there "has been no redness to arm."     Review of patient's allergies indicates:   Allergen Reactions    Pravastatin Other (See Comments)     Muscle pain     HPI Comments: CC: Arm Pain    HPI: Patient is a 76 y.o. F with a past medical history of Coronary artery disease; Diabetes mellitus, type 2; Hyperlipidemia; Hypertension; Hyperthyroidism; Osteoarthritis; and Peripheral arterial disease who presents to the ED for evaluation of acute R forearm pain that began yesterday and R forearm edema that began today. Pain is severe and constant. No symptomatic treatment PTA. Patient denies fever and/or chills. She also notes L arm bruising and swelling at site where blood was drawn yesterday.    The history is provided by the patient. No  was used.     Past Medical History:   Diagnosis Date    AMD (age-related macular degeneration), bilateral 6/10/2016    Cataract     Coronary artery disease     Diabetes mellitus, type 2     Hyperlipidemia     Hypertension     Hyperthyroidism 8/20/2015    Osteoarthritis     Peripheral arterial disease 4/3/2013     Past Surgical History:   Procedure Laterality Date    ABDOMINAL SURGERY      APPENDECTOMY      CARDIAC SURGERY  2008    CABG    CATARACT EXTRACTION Right     CATARACT EXTRACTION W/  INTRAOCULAR LENS IMPLANT  8/13/2015    Dr. Blount ( [facility])    EYE SURGERY  2011    Rt eye cataract    HERNIA REPAIR      HYSTERECTOMY      HYSTEROTOMY N/A 33 yrs old     Family History   Problem Relation Age of Onset    " Diabetes Mother     Coronary artery disease Father     Cataracts Father     Heart disease Brother     Cataracts Sister     No Known Problems Maternal Aunt     No Known Problems Maternal Uncle     No Known Problems Paternal Aunt     No Known Problems Paternal Uncle     No Known Problems Maternal Grandmother     No Known Problems Maternal Grandfather     No Known Problems Paternal Grandmother     No Known Problems Paternal Grandfather     Amblyopia Neg Hx     Blindness Neg Hx     Cancer Neg Hx     Glaucoma Neg Hx     Hypertension Neg Hx     Macular degeneration Neg Hx     Retinal detachment Neg Hx     Strabismus Neg Hx     Stroke Neg Hx     Thyroid disease Neg Hx      Social History   Substance Use Topics    Smoking status: Current Every Day Smoker     Packs/day: 1.00    Smokeless tobacco: None    Alcohol use No     Review of Systems   Constitutional: Negative for chills and fever.   HENT: Negative for sore throat.    Eyes: Negative for redness.   Respiratory: Negative for shortness of breath.    Cardiovascular: Negative for chest pain.   Gastrointestinal: Negative for abdominal pain, diarrhea, nausea and vomiting.   Genitourinary: Negative for dysuria.   Musculoskeletal: Negative for back pain.        (+) R forearm edema  (+) R forearm pain   Skin: Negative for rash.        (+) Ecchymosis to L arm  (+) Edema to L arm   Neurological: Negative for headaches.       Physical Exam   Initial Vitals   BP Pulse Resp Temp SpO2   05/10/17 1740 05/10/17 1740 05/10/17 1740 05/10/17 1740 05/10/17 1740   146/67 87 17 98.7 °F (37.1 °C) 97 %     Physical Exam    Nursing note and vitals reviewed.  Constitutional: She appears well-developed and well-nourished.   PICC line in right upper arm.   HENT:   Head: Normocephalic and atraumatic.   Nose: Nose normal.   Eyes: EOM and lids are normal.   Neck: Neck supple.   Cardiovascular: Normal rate.   Pulses:       Radial pulses are 2+ on the right side    Pulmonary/Chest: No respiratory distress.   Abdominal: Normal appearance.   Musculoskeletal: Normal range of motion. She exhibits edema and tenderness (Diffuse).        Right upper arm: She exhibits edema (RUE).   No crepitus.  No erythema.   Neurological: She is alert.   Skin: Skin is warm and dry. No erythema.   Psychiatric: She has a normal mood and affect. Her behavior is normal. Thought content normal.         ED Course   Procedures  Labs Reviewed - No data to display          Medical Decision Making:   History:   Old Medical Records: I decided to obtain old medical records.  ED Management:  This is a 76-year-old female complaining of right arm swelling and severe tenderness.  She had a PICC line placed about 3 weeks ago for treatment with IV antibiotics for a septic knee.  The patient is taking vancomycin and her last dose was today.  She also takes oral ciprofloxacin.    The swelling began yesterday.  She has severe pain with any movement or touch.  She denies any fever or chills.  She denies injury.    On exam, the vital signs are stable.  She is nontoxic and non-ill-appearing.  She does not have fever.  She does have mild edema to the diffuse right upper extremity.  There is no focal area of infection.  There is no erythema or drainage or mass to suggest an abscess or cellulitis.  There is no soft tissue injury.  There is no deformity.  An ultrasound was obtained and negative for DVT in the right upper she rated.  The PICC line was in adequate positioning.  She has a 2+ radial pulse.  There is no evidence of arterial thromboembolism.  Given her chronic use of vancomycin and her absence of symptoms, cellulitis is highly unlikely.  She may be having pain due to the new edema.  She was advised to elevate her arm and to buy compression sleeve to help with symptoms.  She will be given Norco for pain.  She will return if she has any change or worsening of symptoms.            Scribe Attestation:   Scribe #1: I  performed the above scribed service and the documentation accurately describes the services I performed. I attest to the accuracy of the note.    Attending Attestation:           Physician Attestation for Scribe:  Physician Attestation Statement for Scribe #1: I, Himanshu Rao MD, reviewed documentation, as scribed by Gisele Caro in my presence, and it is both accurate and complete.                 ED Course     Clinical Impression:   The encounter diagnosis was Arm edema.    Disposition:   Disposition: Discharged  Condition: Stable       Himanshu Rao MD  05/10/17 2300

## 2017-05-11 NOTE — PROGRESS NOTES
"[facility]  Follow-up Visit Progress Note     PRESENTING HISTORY     PCP: Aimee Marquis MD  Chief Complaint/Reason for Visit:  Follow up visit from recent visit.  Last [facility] Visit: 4/27/17    No chief complaint on file.      History of Present Illness:  Ms. Khloe Kendrick is a 76 y.o. female.-----HX: AMD, CAD, DM type II, HTN, hyperthyroidism, osteoarthritis, and peripheral arterial disease  -----Presents to the ED c/o acute onset atraumatic R sided knee pain that radiates down back of R sided calf that started yesterday, Pain is severe (10/10). Movement and palpation exacerbates the pain. Pt reports similar symptoms last yr on her L leg; however, pt states that her doctor wasn't able to figure out the problem. Pt reports sleeping with her legs "twisted." Pt also reports itchiness to her bilateral arms and legs. Pt denies hx of blood clots. Pt denies fever, chills, or cough. No attempted treatment reported. No alleviating factors or other symptoms reported.patient has negative DVT study,X ray show no sign of fracture,she has has leucocytosis with elevated ESR and CRP,she had joint aspiration in ER ,which show many WBC,her right knee is swollen and very tender,no crystals has been seen,no major growth at this time,consulted Ortho and ID for further evaluation,keep patient NPO at this time. Procedure(s) (LRB): INCISION AND DRAINAGE-KNEE arthroscopic (Right)   -----Hospital Course: 75 y/o female with AMD, CAD, DM type II, HTN, hyperthyroidism, osteoarthritis, and peripheral arterial disease presents to the ED c/o acute onset atraumatic R sided knee pain that radiates down back of R sided , Pain is severe (10/10). Movement and palpation exacerbates the pain. Pt reports similar symptoms last year on her L leg; however, pt states that her doctor wasn't able to figure out the problem. Pt reports sleeping with her legs "twisted." Pt also reports itchiness to her bilateral arms and legs. Pt denies hx of " blood clots. Pt denies fever, chills, or cough. No attempted treatment reported. No alleviating factors or other symptoms reported.patient has negative DVT study,X ray show no sign of fracture,she has had leucocytosis with elevated ESR and CRP,she had joint aspiration in ER ,which show many WBC,her right knee was very swollen and very tender,no crystals has been seen,no major growth at this time,consulted Ortho and ID for further evaluation,S/P washout by ortho on 4.22.17.followed cultures.fever resolved,no major growth in cultures,but many WBC,her pain, swelling much improved,per ID recommendation patient has been discharged home with IV Abx with vancomycin and PO cipro for 4 weeks with HH and follow up with PCP in next few days.     TODAY: Patient presents today for follow up. Of note she was in ED on yesterday (5/10/17) for discomfort to PICC site. US obtained at that time to r/o DVT and no signs of infectious etiology appreciated by ED physician. She was sent home with Rx for pain medication. Today she presents with persistent pain and swelling. On exam noted that swelling not present above elbow and no pain or swelling at actual PICC site. Largely localized to bardales aspect of R thumb and diffusely tender R hand. Patient denies recent injury to area but does endorse a fall over one week ago. Concern for gout vs scaphoid fracture? Uric acid ordered as add on to am labs and patient to go for x ray hand now.     Review of Systems   Constitutional: Negative for chills and fever.   HENT: Negative for congestion and sore throat.    Respiratory: Negative for cough and shortness of breath.    Cardiovascular: Negative for chest pain and palpitations.   Gastrointestinal: Negative for abdominal pain and nausea.   Endocrine: Negative for cold intolerance and heat intolerance.   Genitourinary: Negative for dysuria and frequency.   Musculoskeletal: Positive for arthralgias and joint swelling. Negative for myalgias.        R  hand pain and swelling   Skin: Negative for color change and rash.   Neurological: Negative for dizziness and headaches.   Psychiatric/Behavioral: Negative for behavioral problems and confusion.           PAST HISTORY:     Past Medical History:   Diagnosis Date    AMD (age-related macular degeneration), bilateral 6/10/2016    Cataract     Coronary artery disease     Diabetes mellitus, type 2     Hyperlipidemia     Hypertension     Hyperthyroidism 8/20/2015    Osteoarthritis     Peripheral arterial disease 4/3/2013       Past Surgical History:   Procedure Laterality Date    ABDOMINAL SURGERY      APPENDECTOMY      CARDIAC SURGERY  2008    CABG    CATARACT EXTRACTION Right     CATARACT EXTRACTION W/  INTRAOCULAR LENS IMPLANT  8/13/2015    Dr. Blount ( [facility])    EYE SURGERY  2011    Rt eye cataract    HERNIA REPAIR      HYSTERECTOMY      HYSTEROTOMY N/A 33 yrs old       Family History   Problem Relation Age of Onset    Diabetes Mother     Coronary artery disease Father     Cataracts Father     Heart disease Brother     Cataracts Sister     No Known Problems Maternal Aunt     No Known Problems Maternal Uncle     No Known Problems Paternal Aunt     No Known Problems Paternal Uncle     No Known Problems Maternal Grandmother     No Known Problems Maternal Grandfather     No Known Problems Paternal Grandmother     No Known Problems Paternal Grandfather     Amblyopia Neg Hx     Blindness Neg Hx     Cancer Neg Hx     Glaucoma Neg Hx     Hypertension Neg Hx     Macular degeneration Neg Hx     Retinal detachment Neg Hx     Strabismus Neg Hx     Stroke Neg Hx     Thyroid disease Neg Hx        Social History     Social History    Marital status:      Spouse name: N/A    Number of children: N/A    Years of education: N/A     Social History Main Topics    Smoking status: Current Every Day Smoker     Packs/day: 1.00    Smokeless tobacco: Not on file    Alcohol use No    Drug  use: No    Sexual activity: Not on file     Other Topics Concern    Not on file     Social History Narrative       MEDICATIONS & ALLERGIES:     Current Outpatient Prescriptions on File Prior to Visit   Medication Sig Dispense Refill    ACCU-CHEK SMARTVIEW TEST STRIP Strp       ALCOHOL PREP PADS PadM       aspirin 81 MG Chew Take 81 mg by mouth once daily.      cetirizine (ZYRTEC) 10 MG tablet TAKE 1 TABLET (10 MG TOTAL) BY MOUTH DAILY AS NEEDED FOR ALLERGIES OR RHINITIS.  5    cilostazol (PLETAL) 50 MG Tab Take 1 tablet (50 mg total) by mouth once daily. 90 tablet 1    ciprofloxacin HCl (CIPRO) 500 MG tablet Take 1 tablet (500 mg total) by mouth every 12 (twelve) hours. 56 tablet 0    dextrose 5 % SolP 250 mL with vancomycin 1,000 mg SolR 1,250 mg Inject 1,250 mg into the vein once daily.      fluticasone (FLONASE) 50 mcg/actuation nasal spray USE 1 SPRAY BY EACH NARE ROUTE ONCE DAILY.  5    gabapentin (NEURONTIN) 300 MG capsule Take 1 capsule (300 mg total) by mouth every evening. 30 capsule 11    hydrocodone-acetaminophen 5-325mg (NORCO) 5-325 mg per tablet Take 1 tablet by mouth every 4 (four) hours as needed for Pain. 10 tablet 0    ibuprofen (ADVIL,MOTRIN) 800 MG tablet Take 1 tablet (800 mg total) by mouth every 6 (six) hours as needed for Pain. 15 tablet 0    lancets 30 gauge Misc       lancing device Misc       lidocaine-prilocaine (EMLA) cream Apply topically as needed. To left shoulder 30 g 0    metformin (GLUCOPHAGE) 500 MG tablet Take 1 tablet (500 mg total) by mouth 2 (two) times daily with meals. (Patient taking differently: Take 500 mg by mouth once daily. ) 180 tablet 1    metoprolol tartrate (LOPRESSOR) 50 MG tablet Take 1 tablet (50 mg total) by mouth once daily. 90 tablet 1    nitroGLYCERIN (NITROSTAT) 0.4 MG SL tablet Place 1 tablet (0.4 mg total) under the tongue every 5 (five) minutes as needed for Chest pain. 1 Tablet, Sublingual Sublingual as needed .  as directed 90 tablet  0    propylthiouracil (PTU) 50 mg Tab Take 2 tablets (100 mg total) by mouth every 12 (twelve) hours. 60 tablet 0    ramipril (ALTACE) 5 MG capsule Take 1 capsule (5 mg total) by mouth once daily. 90 capsule 1     Current Facility-Administered Medications on File Prior to Visit   Medication Dose Route Frequency Provider Last Rate Last Dose    [COMPLETED] hydromorphone (PF) injection 0.5 mg  0.5 mg Intramuscular ED 1 Time Himanshu Rao MD   0.5 mg at 05/10/17 2047    [COMPLETED] hydromorphone (PF) injection 0.5 mg  0.5 mg Intramuscular ED 1 Time Himanshu Rao MD   0.5 mg at 05/10/17 2242        Review of patient's allergies indicates:   Allergen Reactions    Pravastatin Other (See Comments)     Muscle pain       Medications Reconciliation:   I have reconciled the patient's home medications and discharge medications with the patient/family. I have updated all changes.  Refer to After-Visit Medication List.    OBJECTIVE:     Vital Signs:  Vitals:    05/11/17 1532   BP: 139/65   Pulse: 91   Resp: 16   Temp: 98.7 °F (37.1 °C)     Wt Readings from Last 1 Encounters:   05/10/17 1740 54.4 kg (120 lb)     Body mass index is 22.18 kg/(m^2).     Physical Exam   Constitutional: She is oriented to person, place, and time. She appears well-developed and well-nourished. No distress.   HENT:   Head: Normocephalic and atraumatic.   Eyes: EOM are normal. Pupils are equal, round, and reactive to light.   Neck: Normal range of motion. Neck supple.   Cardiovascular: Normal rate and regular rhythm.    No murmur heard.  Pulmonary/Chest: Effort normal and breath sounds normal.   Abdominal: Soft. Bowel sounds are normal. There is no tenderness.   Musculoskeletal: Normal range of motion.   Neurological: She is alert and oriented to person, place, and time.   Skin: Skin is warm and dry. No rash noted.   Mild erythema to bardales side of R thumb; diffuse swelling and tenderness to R hand; R sided PICC line intact   Psychiatric: She has a  normal mood and affect. Her behavior is normal.         Laboratory  Lab Results   Component Value Date    WBC 10.97 05/11/2017    HGB 10.5 (L) 05/11/2017    HCT 31.8 (L) 05/11/2017     05/11/2017    CHOL 162 08/24/2016    TRIG 63 08/24/2016    HDL 57 08/24/2016    ALT 8 (L) 05/08/2017    AST 19 05/08/2017     05/08/2017    K 4.2 05/08/2017     05/08/2017    CREATININE 1.4 05/11/2017    BUN 17 05/08/2017    CO2 26 05/08/2017    TSH <0.006 (L) 05/09/2017    INR 1.0 04/20/2017    HGBA1C 6.4 (H) 04/21/2017         Diagnostic Results:  none      ASSESSMENT & PLAN:     HIGH RISK CONDITION(S):  none    Gout of right hand, unspecified cause, unspecified   R hand swelling concerning for possible gout. Uric acid lab add on to am labs. Called in colchicine to pharmacy for patient to take on today. Will call with lab results.   -     Uric acid; Future; Expected date: 5/11/17  -     colchicine 0.6 mg tablet; Take 1 tablet (0.6 mg total) by mouth once daily. Take 1.2 mg (2 pills) then .6mg one hour later  Dispense: 3 tablet; Refill: 0    Localized swelling on right hand  Patient with erythema and swelling to R hand. Presented on yesterday to ED for this at which time US obtained to r/o DVT. Patient was sent home with pain medication Rx but continues to have significant pain to area. Doubt cellulitis as patient already on IV vanc and cipro for septic joint. Concern for gout vs scaphoid fracture as most tender and edematous at bardales aspect R thumb. Will obtain x ray as none taken in ED. Will call patient with result and possibly refer to ED if indeed fracture present.   -     X-Ray Wrist Navicular Views Right; Future; Expected date: 5/11/17    Septic arthritis: continues to improve; denies pain in the knee during clinic visit; afebrile; vitals stable; she is getting Vanc & Cipro; the Vanc is IV will require 28 days per Dr. Patricio's hospital note; she has had 20 days to date will require 8 more days; [facility]  providing infusion services; RUE PICC line intact; we will follow along until ABX complete and PICC removed. Vanc levels, CBC and TSH's drawn by home health, verified by our office     HTN: Well controlled currently. Continue home anti hypertensive regimen.      Hyperthyroidism: continue PTU - followed by Chastity Tyson, appointment on 5/9/17 with adjustments to PTU dosage--continue as per these directions.      DMII: Well controlled, A1c 6.4. Taken off glipizide at endocrinology appt earlier this week and started on metformin      Pain R knee: related to above; Ibuprofen 800 mg PRN pain    Instructions for the patient:      Scheduled Follow-up :  Future Appointments  Date Time Provider Department Center   7/12/2017 12:00 PM Chastity Tyson MD [facility]       After Visit Medication List :     Medication List          This list is accurate as of: 5/11/17  4:25 PM.  Always use your most recent med list.                     ACCU-CHEK SMARTVIEW TEST STRIP Strp   Generic drug:  blood sugar diagnostic       ALCOHOL PREP PADS Padm   Generic drug:  alcohol swabs       aspirin 81 MG Chew       cetirizine 10 MG tablet   Commonly known as:  ZYRTEC       cilostazol 50 MG Tab   Commonly known as:  PLETAL   Take 1 tablet (50 mg total) by mouth once daily.       ciprofloxacin HCl 500 MG tablet   Commonly known as:  CIPRO   Take 1 tablet (500 mg total) by mouth every 12 (twelve) hours.       colchicine 0.6 mg tablet   Take 1 tablet (0.6 mg total) by mouth once daily. Take 1.2 mg (2 pills) then .6mg one hour later       dextrose 5 % SolP 250 mL with vancomycin 1,000 mg SolR 1,250 mg   Inject 1,250 mg into the vein once daily.       fluticasone 50 mcg/actuation nasal spray   Commonly known as:  FLONASE       gabapentin 300 MG capsule   Commonly known as:  NEURONTIN   Take 1 capsule (300 mg total) by mouth every evening.       hydrocodone-acetaminophen 5-325mg 5-325 mg per tablet   Commonly known as:  NORCO   Take 1 tablet by mouth  every 4 (four) hours as needed for Pain.       ibuprofen 800 MG tablet   Commonly known as:  ADVIL,MOTRIN   Take 1 tablet (800 mg total) by mouth every 6 (six) hours as needed for Pain.       lancets 30 gauge Misc       lancing device Misc       lidocaine-prilocaine cream   Commonly known as:  EMLA   Apply topically as needed. To left shoulder       metformin 500 MG tablet   Commonly known as:  GLUCOPHAGE   Take 1 tablet (500 mg total) by mouth 2 (two) times daily with meals.       metoprolol tartrate 50 MG tablet   Commonly known as:  LOPRESSOR   Take 1 tablet (50 mg total) by mouth once daily.       nitroGLYCERIN 0.4 MG SL tablet   Commonly known as:  NITROSTAT   Place 1 tablet (0.4 mg total) under the tongue every 5 (five) minutes as needed for Chest pain. 1 Tablet, Sublingual Sublingual as needed .  as directed       propylthiouracil 50 mg Tab   Commonly known as:  PTU   Take 2 tablets (100 mg total) by mouth every 12 (twelve) hours.            Where to Get Your Medications      These medications were sent to [facility]/pharmacy #[id number] [place], [place]  [facility] [address] [address]     Phone:  [phone]     colchicine 0.6 mg tablet             Signing Physician:    Varsha Sun PA-C  Hospitalist-Department of Hospital Medicine  [address] [place] [address]  Office [phone] Pager [phone]

## 2017-05-12 RX ORDER — COLCHICINE 0.6 MG/1
0.6 TABLET ORAL DAILY
Qty: 5 TABLET | Refills: 0 | Status: SHIPPED | OUTPATIENT
Start: 2017-05-12 | End: 2017-05-29 | Stop reason: SDUPTHER

## 2017-05-12 NOTE — PROGRESS NOTES
Telephone:  Call placed to patient with results from lab and x ray on 5/11/17. X ray without evidence for fracture. Uric acid level was elevated. Advised patient to take colchicine as prescribed and call clinic in am to discuss progress. This morning spoke with patient daughter, Heather, who states patient pain and swelling significantly improved. Will continue medication and call back on Monday to schedule follow up.

## 2017-05-16 ENCOUNTER — LAB VISIT (OUTPATIENT)
Dept: LAB | Facility: HOSPITAL | Age: 77
End: 2017-05-16
Attending: INTERNAL MEDICINE
Payer: MEDICARE

## 2017-05-16 DIAGNOSIS — M00.9 PYOGENIC ARTHRITIS, UPPER ARM: Primary | ICD-10-CM

## 2017-05-16 LAB
ALBUMIN SERPL BCP-MCNC: 3 G/DL
ALP SERPL-CCNC: 74 U/L
ALT SERPL W/O P-5'-P-CCNC: 9 U/L
ANION GAP SERPL CALC-SCNC: 10 MMOL/L
AST SERPL-CCNC: 17 U/L
BASOPHILS # BLD AUTO: 0.04 K/UL
BASOPHILS NFR BLD: 0.6 %
BILIRUB SERPL-MCNC: 0.4 MG/DL
BUN SERPL-MCNC: 26 MG/DL
CALCIUM SERPL-MCNC: 9.2 MG/DL
CHLORIDE SERPL-SCNC: 106 MMOL/L
CO2 SERPL-SCNC: 27 MMOL/L
CREAT SERPL-MCNC: 1.7 MG/DL
CRP SERPL-MCNC: 13.8 MG/L
DIFFERENTIAL METHOD: ABNORMAL
EOSINOPHIL # BLD AUTO: 0.2 K/UL
EOSINOPHIL NFR BLD: 3.2 %
ERYTHROCYTE [DISTWIDTH] IN BLOOD BY AUTOMATED COUNT: 14.8 %
ERYTHROCYTE [SEDIMENTATION RATE] IN BLOOD BY WESTERGREN METHOD: 59 MM/HR
EST. GFR  (AFRICAN AMERICAN): 33 ML/MIN/1.73 M^2
EST. GFR  (NON AFRICAN AMERICAN): 29 ML/MIN/1.73 M^2
GLUCOSE SERPL-MCNC: 115 MG/DL
HCT VFR BLD AUTO: 35.8 %
HGB BLD-MCNC: 11.7 G/DL
LYMPHOCYTES # BLD AUTO: 1.5 K/UL
LYMPHOCYTES NFR BLD: 23.5 %
MCH RBC QN AUTO: 27.3 PG
MCHC RBC AUTO-ENTMCNC: 32.7 %
MCV RBC AUTO: 84 FL
MONOCYTES # BLD AUTO: 0.5 K/UL
MONOCYTES NFR BLD: 7.3 %
NEUTROPHILS # BLD AUTO: 4.1 K/UL
NEUTROPHILS NFR BLD: 65.4 %
PLATELET # BLD AUTO: 312 K/UL
PMV BLD AUTO: 10.3 FL
POTASSIUM SERPL-SCNC: 4.2 MMOL/L
PROT SERPL-MCNC: 6.5 G/DL
RBC # BLD AUTO: 4.28 M/UL
SODIUM SERPL-SCNC: 143 MMOL/L
VANCOMYCIN TROUGH SERPL-MCNC: 34.7 UG/ML
WBC # BLD AUTO: 6.2 K/UL

## 2017-05-16 PROCEDURE — 86140 C-REACTIVE PROTEIN: CPT

## 2017-05-16 PROCEDURE — 85651 RBC SED RATE NONAUTOMATED: CPT

## 2017-05-16 PROCEDURE — 80202 ASSAY OF VANCOMYCIN: CPT

## 2017-05-16 PROCEDURE — 85025 COMPLETE CBC W/AUTO DIFF WBC: CPT

## 2017-05-16 PROCEDURE — 80053 COMPREHEN METABOLIC PANEL: CPT

## 2017-05-17 ENCOUNTER — LAB VISIT (OUTPATIENT)
Dept: LAB | Facility: HOSPITAL | Age: 77
End: 2017-05-17
Attending: INTERNAL MEDICINE
Payer: MEDICARE

## 2017-05-17 DIAGNOSIS — Z79.2 ENCOUNTER FOR LONG-TERM (CURRENT) USE OF ANTIBIOTICS: Primary | ICD-10-CM

## 2017-05-17 LAB
CREAT SERPL-MCNC: 1.6 MG/DL
EST. GFR  (AFRICAN AMERICAN): 36 ML/MIN/1.73 M^2
EST. GFR  (NON AFRICAN AMERICAN): 31 ML/MIN/1.73 M^2
VANCOMYCIN SERPL-MCNC: 36.9 UG/ML

## 2017-05-17 PROCEDURE — 80202 ASSAY OF VANCOMYCIN: CPT

## 2017-05-17 PROCEDURE — 36415 COLL VENOUS BLD VENIPUNCTURE: CPT

## 2017-05-17 PROCEDURE — 82565 ASSAY OF CREATININE: CPT

## 2017-05-18 ENCOUNTER — LAB VISIT (OUTPATIENT)
Dept: LAB | Facility: HOSPITAL | Age: 77
End: 2017-05-18
Attending: INTERNAL MEDICINE
Payer: MEDICARE

## 2017-05-18 DIAGNOSIS — Z79.2 ENCOUNTER FOR LONG-TERM (CURRENT) USE OF ANTIBIOTICS: ICD-10-CM

## 2017-05-18 DIAGNOSIS — M00.9 PYOGENIC ARTHRITIS, UPPER ARM: Primary | ICD-10-CM

## 2017-05-18 LAB
CREAT SERPL-MCNC: 1.6 MG/DL
EST. GFR  (AFRICAN AMERICAN): 36 ML/MIN/1.73 M^2
EST. GFR  (NON AFRICAN AMERICAN): 31 ML/MIN/1.73 M^2
VANCOMYCIN SERPL-MCNC: 28.1 UG/ML

## 2017-05-18 PROCEDURE — 80202 ASSAY OF VANCOMYCIN: CPT

## 2017-05-18 PROCEDURE — 36415 COLL VENOUS BLD VENIPUNCTURE: CPT

## 2017-05-18 PROCEDURE — 82565 ASSAY OF CREATININE: CPT

## 2017-05-19 ENCOUNTER — CLINICAL SUPPORT (OUTPATIENT)
Dept: SMOKING CESSATION | Facility: CLINIC | Age: 77
End: 2017-05-19
Payer: COMMERCIAL

## 2017-05-19 DIAGNOSIS — F17.200 NICOTINE DEPENDENCE: Primary | ICD-10-CM

## 2017-05-19 PROCEDURE — 99406 BEHAV CHNG SMOKING 3-10 MIN: CPT | Mod: S$GLB,,,

## 2017-05-22 ENCOUNTER — LAB VISIT (OUTPATIENT)
Dept: LAB | Facility: HOSPITAL | Age: 77
End: 2017-05-22
Attending: INTERNAL MEDICINE
Payer: MEDICARE

## 2017-05-22 DIAGNOSIS — M00.9 PYOGENIC ARTHRITIS, UPPER ARM: Primary | ICD-10-CM

## 2017-05-22 LAB
CREAT SERPL-MCNC: 1.7 MG/DL
EST. GFR  (AFRICAN AMERICAN): 33 ML/MIN/1.73 M^2
EST. GFR  (NON AFRICAN AMERICAN): 29 ML/MIN/1.73 M^2

## 2017-05-22 PROCEDURE — 82565 ASSAY OF CREATININE: CPT

## 2017-05-29 ENCOUNTER — LAB VISIT (OUTPATIENT)
Dept: LAB | Facility: HOSPITAL | Age: 77
End: 2017-05-29
Attending: FAMILY MEDICINE
Payer: MEDICARE

## 2017-05-29 ENCOUNTER — OFFICE VISIT (OUTPATIENT)
Dept: FAMILY MEDICINE | Facility: CLINIC | Age: 77
End: 2017-05-29
Payer: MEDICARE

## 2017-05-29 VITALS
HEART RATE: 85 BPM | WEIGHT: 117.5 LBS | HEIGHT: 62 IN | SYSTOLIC BLOOD PRESSURE: 142 MMHG | BODY MASS INDEX: 21.62 KG/M2 | TEMPERATURE: 98 F | OXYGEN SATURATION: 99 % | DIASTOLIC BLOOD PRESSURE: 60 MMHG

## 2017-05-29 DIAGNOSIS — I20.9 ANGINA PECTORIS: Chronic | ICD-10-CM

## 2017-05-29 DIAGNOSIS — M10.9 GOUT OF RIGHT HAND, UNSPECIFIED CAUSE, UNSPECIFIED CHRONICITY: ICD-10-CM

## 2017-05-29 DIAGNOSIS — I10 ESSENTIAL HYPERTENSION: ICD-10-CM

## 2017-05-29 DIAGNOSIS — E11.69 COMBINED HYPERLIPIDEMIA ASSOCIATED WITH TYPE 2 DIABETES MELLITUS: Chronic | ICD-10-CM

## 2017-05-29 DIAGNOSIS — I70.0 ATHEROSCLEROSIS OF AORTA: ICD-10-CM

## 2017-05-29 DIAGNOSIS — E05.90 HYPERTHYROIDISM: ICD-10-CM

## 2017-05-29 DIAGNOSIS — E78.2 COMBINED HYPERLIPIDEMIA ASSOCIATED WITH TYPE 2 DIABETES MELLITUS: Chronic | ICD-10-CM

## 2017-05-29 DIAGNOSIS — E11.42 CONTROLLED TYPE 2 DIABETES MELLITUS WITH DIABETIC POLYNEUROPATHY, WITHOUT LONG-TERM CURRENT USE OF INSULIN: Primary | ICD-10-CM

## 2017-05-29 DIAGNOSIS — I73.9 PERIPHERAL ARTERIAL DISEASE: Chronic | ICD-10-CM

## 2017-05-29 DIAGNOSIS — I25.119 CORONARY ARTERY DISEASE INVOLVING NATIVE CORONARY ARTERY OF NATIVE HEART WITH ANGINA PECTORIS: Chronic | ICD-10-CM

## 2017-05-29 LAB
ALBUMIN SERPL BCP-MCNC: 2.8 G/DL
ALP SERPL-CCNC: 88 U/L
ALT SERPL W/O P-5'-P-CCNC: 7 U/L
ANION GAP SERPL CALC-SCNC: 10 MMOL/L
AST SERPL-CCNC: 14 U/L
BILIRUB SERPL-MCNC: 0.3 MG/DL
BUN SERPL-MCNC: 29 MG/DL
CALCIUM SERPL-MCNC: 9.1 MG/DL
CHLORIDE SERPL-SCNC: 102 MMOL/L
CO2 SERPL-SCNC: 27 MMOL/L
CREAT SERPL-MCNC: 1.6 MG/DL
EST. GFR  (AFRICAN AMERICAN): 35.6 ML/MIN/1.73 M^2
EST. GFR  (NON AFRICAN AMERICAN): 30.9 ML/MIN/1.73 M^2
GLUCOSE SERPL-MCNC: 370 MG/DL
POTASSIUM SERPL-SCNC: 4.1 MMOL/L
PROT SERPL-MCNC: 7 G/DL
SODIUM SERPL-SCNC: 139 MMOL/L
URATE SERPL-MCNC: 6.8 MG/DL

## 2017-05-29 PROCEDURE — 84550 ASSAY OF BLOOD/URIC ACID: CPT

## 2017-05-29 PROCEDURE — 99499 UNLISTED E&M SERVICE: CPT | Mod: S$GLB,,, | Performed by: FAMILY MEDICINE

## 2017-05-29 PROCEDURE — 1125F AMNT PAIN NOTED PAIN PRSNT: CPT | Mod: S$GLB,,, | Performed by: FAMILY MEDICINE

## 2017-05-29 PROCEDURE — 36415 COLL VENOUS BLD VENIPUNCTURE: CPT | Mod: PO

## 2017-05-29 PROCEDURE — 1159F MED LIST DOCD IN RCRD: CPT | Mod: S$GLB,,, | Performed by: FAMILY MEDICINE

## 2017-05-29 PROCEDURE — 99214 OFFICE O/P EST MOD 30 MIN: CPT | Mod: S$GLB,,, | Performed by: FAMILY MEDICINE

## 2017-05-29 PROCEDURE — 99999 PR PBB SHADOW E&M-EST. PATIENT-LVL III: CPT | Mod: PBBFAC,,, | Performed by: FAMILY MEDICINE

## 2017-05-29 PROCEDURE — 80053 COMPREHEN METABOLIC PANEL: CPT

## 2017-05-29 RX ORDER — COLCHICINE 0.6 MG/1
1.2 TABLET ORAL DAILY
Qty: 30 TABLET | Refills: 0 | Status: SHIPPED | OUTPATIENT
Start: 2017-05-29 | End: 2017-10-18

## 2017-05-29 RX ORDER — SODIUM CHLORIDE 0.9 % (FLUSH) 0.9 %
SYRINGE (ML) INJECTION
COMMUNITY
Start: 2017-05-09 | End: 2018-04-11

## 2017-05-29 NOTE — PATIENT INSTRUCTIONS

## 2017-05-30 RX ORDER — GLIPIZIDE 10 MG/1
10 TABLET ORAL
Qty: 180 TABLET | Refills: 3 | Status: SHIPPED | OUTPATIENT
Start: 2017-05-30 | End: 2017-12-27 | Stop reason: SDUPTHER

## 2017-06-04 NOTE — PROGRESS NOTES
Routine Office Visit    Patient Name: Khloe Kendrick    : 1940  MRN: [id number]    Subjective:  Khloe is a 76 y.o. female who presents today for     1. Hospital and priority clinic for atraumatic right sided knee pain. Pt had arthroscopic knee procedure. It was suspected she may have an infection due to high wbc in joint aspiration. She was discharged home with PICC line for IV vancomycin and PO cipro. She states pain in her right knee has improved. She has home health to help with medications. She is requesting a refill on colchicine because she states it helps with the pain. She has no fever.       Review of Systems   Constitutional: Negative for chills and fever.   HENT: Negative for congestion.    Eyes: Negative for blurred vision.   Respiratory: Negative for cough.    Cardiovascular: Negative for chest pain.   Gastrointestinal: Negative for abdominal pain, constipation, diarrhea, heartburn, nausea and vomiting.   Genitourinary: Negative for dysuria.   Musculoskeletal: Negative for myalgias.   Skin: Negative for itching and rash.   Neurological: Negative for dizziness and headaches.   Psychiatric/Behavioral: Negative for depression.       Active Problem List  Patient Active Problem List   Diagnosis    Diabetes mellitus type 2, controlled    Essential hypertension    Combined hyperlipidemia associated with type 2 diabetes mellitus    Osteopenia    Osteoarthritis    Tobacco use    Asymptomatic carotid artery stenosis without infarction    Peripheral arterial disease    Atherosclerosis of aorta    Angina pectoris    Coronary artery disease    Pseudophakia    Senile nuclear sclerosis    Hyperthyroidism    Weight loss    Allergic rhinitis    AMD (age-related macular degeneration), bilateral    Bilateral posterior capsular opacification    Vitreomacular traction syndrome of left eye    Pyogenic arthritis of right knee joint    Sepsis       Past Surgical History  Past Surgical History:    Procedure Laterality Date    ABDOMINAL SURGERY      APPENDECTOMY      CARDIAC SURGERY  2008    CABG    CATARACT EXTRACTION Right     CATARACT EXTRACTION W/  INTRAOCULAR LENS IMPLANT  8/13/2015    Dr. Blount ( [facility])    EYE SURGERY  2011    Rt eye cataract    HERNIA REPAIR      HYSTERECTOMY      HYSTEROTOMY N/A 33 yrs old       Family History  Family History   Problem Relation Age of Onset    Diabetes Mother     Coronary artery disease Father     Cataracts Father     Heart disease Brother     Cataracts Sister     No Known Problems Maternal Aunt     No Known Problems Maternal Uncle     No Known Problems Paternal Aunt     No Known Problems Paternal Uncle     No Known Problems Maternal Grandmother     No Known Problems Maternal Grandfather     No Known Problems Paternal Grandmother     No Known Problems Paternal Grandfather     Amblyopia Neg Hx     Blindness Neg Hx     Cancer Neg Hx     Glaucoma Neg Hx     Hypertension Neg Hx     Macular degeneration Neg Hx     Retinal detachment Neg Hx     Strabismus Neg Hx     Stroke Neg Hx     Thyroid disease Neg Hx        Social History  Social History     Social History    Marital status:      Spouse name: N/A    Number of children: N/A    Years of education: N/A     Occupational History    Not on file.     Social History Main Topics    Smoking status: Current Every Day Smoker     Packs/day: 1.00    Smokeless tobacco: Not on file    Alcohol use No    Drug use: No    Sexual activity: Not on file     Other Topics Concern    Not on file     Social History Narrative    No narrative on file       Medications and Allergies  Reviewed and updated.   Current Outpatient Prescriptions   Medication Sig    ACCU-CHEK SMARTVIEW TEST STRIP Strp     ALCOHOL PREP PADS PadM     aspirin 81 MG Chew Take 81 mg by mouth once daily.    cetirizine (ZYRTEC) 10 MG tablet TAKE 1 TABLET (10 MG TOTAL) BY MOUTH DAILY AS NEEDED FOR ALLERGIES OR RHINITIS.  "   cilostazol (PLETAL) 50 MG Tab Take 1 tablet (50 mg total) by mouth once daily.    fluticasone (FLONASE) 50 mcg/actuation nasal spray USE 1 SPRAY BY EACH NARE ROUTE ONCE DAILY.    gabapentin (NEURONTIN) 300 MG capsule Take 1 capsule (300 mg total) by mouth every evening.    lancets 30 gauge Misc     lancing device Misc     metoprolol tartrate (LOPRESSOR) 50 MG tablet Take 1 tablet (50 mg total) by mouth once daily.    nitroGLYCERIN (NITROSTAT) 0.4 MG SL tablet Place 1 tablet (0.4 mg total) under the tongue every 5 (five) minutes as needed for Chest pain. 1 Tablet, Sublingual Sublingual as needed .  as directed    NORMAL SALINE FLUSH 0.9 % injection     colchicine 0.6 mg tablet Take 2 tablets (1.2 mg total) by mouth once daily. At onset of gout symptoms. May repeat tablet every 12 hours    glipiZIDE (GLUCOTROL) 10 MG tablet Take 1 tablet (10 mg total) by mouth 2 (two) times daily before meals.    ibuprofen (ADVIL,MOTRIN) 800 MG tablet Take 1 tablet (800 mg total) by mouth every 6 (six) hours as needed for Pain.    lidocaine-prilocaine (EMLA) cream Apply topically as needed. To left shoulder    metformin (GLUCOPHAGE) 500 MG tablet Take 1 tablet (500 mg total) by mouth 2 (two) times daily with meals. (Patient taking differently: Take 500 mg by mouth once daily. )    propylthiouracil (PTU) 50 mg Tab Take 2 tablets (100 mg total) by mouth every 12 (twelve) hours.     No current facility-administered medications for this visit.        Physical Exam  BP (!) 142/60 (BP Location: Right arm, Patient Position: Sitting, BP Method: Manual)   Pulse 85   Temp 97.7 °F (36.5 °C) (Oral)   Ht 5' 2" (1.575 m)   Wt 53.3 kg (117 lb 8.1 oz)   SpO2 99%   BMI 21.49 kg/m²   Physical Exam   Constitutional: She is oriented to person, place, and time. She appears well-developed and well-nourished.   HENT:   Head: Normocephalic and atraumatic.   Eyes: Conjunctivae and EOM are normal. Pupils are equal, round, and reactive to " light.   Neck: Normal range of motion. Neck supple.   Cardiovascular: Normal rate, regular rhythm and normal heart sounds.  Exam reveals no gallop and no friction rub.    No murmur heard.  Pulmonary/Chest: Breath sounds normal. No respiratory distress.   Abdominal: Soft. Bowel sounds are normal. She exhibits no distension. There is no tenderness.   Musculoskeletal: Normal range of motion.   Lymphadenopathy:     She has no cervical adenopathy.   Neurological: She is alert and oriented to person, place, and time.   Skin: Skin is warm.   Psychiatric: She has a normal mood and affect.         Assessment/Plan:  Khloe Kendrick is a 76 y.o. female who presents today for :    Controlled type 2 diabetes mellitus with diabetic polyneuropathy, without long-term current use of insulin  -     glipiZIDE (GLUCOTROL) 10 MG tablet; Take 1 tablet (10 mg total) by mouth 2 (two) times daily before meals.  Dispense: 180 tablet; Refill: 3  The current medical regimen is effective;  continue present plan and medications.    Gout of right hand, unspecified cause, unspecified chronicity  -     Comprehensive metabolic panel; Future; Expected date: 05/29/2017  -     Uric acid; Future; Expected date: 05/29/2017  -     colchicine 0.6 mg tablet; Take 2 tablets (1.2 mg total) by mouth once daily. At onset of gout symptoms. May repeat tablet every 12 hours  Dispense: 30 tablet; Refill: 0  The current medical regimen is effective;  continue present plan and medications.    Atherosclerosis of aorta / Essential hypertension / Peripheral arterial disease / Angina pectoris / Coronary artery disease involving native coronary artery of native heart with angina pectoris  The current medical regimen is effective;  continue present plan and medications.  Patient with Atherosclerosis of the Aorta.  Stable/asymptomatic. Currently stable on lipid lowering treatment and b/p monitoring.     Hyperthyroidism  The current medical regimen is effective;   continue present plan and medications.    Combined hyperlipidemia associated with type 2 diabetes mellitus  The current medical regimen is effective;  continue present plan and medications.                    Return in about 3 months (around 8/29/2017), or if symptoms worsen or fail to improve.

## 2017-07-11 ENCOUNTER — OFFICE VISIT (OUTPATIENT)
Dept: PODIATRY | Facility: CLINIC | Age: 77
End: 2017-07-11
Payer: MEDICARE

## 2017-07-11 VITALS
HEIGHT: 62 IN | DIASTOLIC BLOOD PRESSURE: 55 MMHG | SYSTOLIC BLOOD PRESSURE: 133 MMHG | WEIGHT: 117 LBS | BODY MASS INDEX: 21.53 KG/M2 | HEART RATE: 80 BPM

## 2017-07-11 DIAGNOSIS — B35.1 ONYCHOMYCOSIS DUE TO DERMATOPHYTE: ICD-10-CM

## 2017-07-11 DIAGNOSIS — E11.51 TYPE II DIABETES MELLITUS WITH PERIPHERAL CIRCULATORY DISORDER: Primary | ICD-10-CM

## 2017-07-11 PROCEDURE — 99999 PR PBB SHADOW E&M-EST. PATIENT-LVL III: CPT | Mod: PBBFAC,,, | Performed by: PODIATRIST

## 2017-07-11 PROCEDURE — 99499 UNLISTED E&M SERVICE: CPT | Mod: S$GLB,,, | Performed by: PODIATRIST

## 2017-07-11 PROCEDURE — 11721 DEBRIDE NAIL 6 OR MORE: CPT | Mod: Q9,S$GLB,, | Performed by: PODIATRIST

## 2017-07-12 NOTE — PROGRESS NOTES
Subjective:      Patient ID: Khloe Kendrick is a 77 y.o. female.    Chief Complaint: Diabetes Mellitus (pcp Dr. Marquis 5-29-17); Diabetic Foot Exam; and Nail Care    Khloe is a 77 y.o. female who presents to the clinic for evaluation and treatment of high risk feet. Khloe has a past medical history of AMD (age-related macular degeneration), bilateral (6/10/2016); Cataract; Coronary artery disease; Diabetes mellitus, type 2; Hyperlipidemia; Hypertension; Hyperthyroidism (8/20/2015); Osteoarthritis; and Peripheral arterial disease (4/3/2013). The patient's chief complaint is painful, long, thick toenails; especially the hallux nails bilaterally with light touch. This patient has documented high risk feet requiring routine maintenance secondary to diabetes mellitis and those secondary complications of diabetes, as mentioned..    PCP: Aimee Marquis MD    Date Last Seen by PCP:   Chief Complaint   Patient presents with    Diabetes Mellitus     pcp Dr. Marquis 5-29-17    Diabetic Foot Exam    Nail Care       Current shoe gear:  Worn flat shoe    Hemoglobin A1C   Date Value Ref Range Status   04/21/2017 6.4 (H) 4.5 - 6.2 % Final     Comment:     According to ADA guidelines, hemoglobin A1C <7.0% represents  optimal control in non-pregnant diabetic patients.  Different  metrics may apply to specific populations.   Standards of Medical Care in Diabetes - 2016.  For the purpose of screening for the presence of diabetes:  <5.7%     Consistent with the absence of diabetes  5.7-6.4%  Consistent with increasing risk for diabetes   (prediabetes)  >or=6.5%  Consistent with diabetes  Currently no consensus exists for use of hemoglobin A1C  for diagnosis of diabetes for children.     08/24/2016 6.2 4.5 - 6.2 % Final     Comment:     According to ADA guidelines, hemoglobin A1C <7.0% represents  optimal control in non-pregnant diabetic patients.  Different  metrics may apply to specific populations.   Standards of Medical Care in  Diabetes - 2016.  For the purpose of screening for the presence of diabetes:  <5.7%     Consistent with the absence of diabetes  5.7-6.4%  Consistent with increasing risk for diabetes   (prediabetes)  >or=6.5%  Consistent with diabetes  Currently no consensus exists for use of hemoglobin A1C  for diagnosis of diabetes for children.     04/06/2016 5.9 4.5 - 6.2 % Final       Chief Complaint   Patient presents with    Diabetes Mellitus     pcp Dr. Marquis 5-29-17    Diabetic Foot Exam    Nail Care     Patient Active Problem List   Diagnosis    Diabetes mellitus type 2, controlled    Essential hypertension    Combined hyperlipidemia associated with type 2 diabetes mellitus    Osteopenia    Osteoarthritis    Tobacco use    Asymptomatic carotid artery stenosis without infarction    Peripheral arterial disease    Atherosclerosis of aorta    Angina pectoris    Coronary artery disease    Pseudophakia    Senile nuclear sclerosis    Hyperthyroidism    Weight loss    Allergic rhinitis    AMD (age-related macular degeneration), bilateral    Bilateral posterior capsular opacification    Vitreomacular traction syndrome of left eye    Pyogenic arthritis of right knee joint    Sepsis     Current Outpatient Prescriptions on File Prior to Visit   Medication Sig Dispense Refill    ACCU-CHEK SMARTVIEW TEST STRIP Strp       ALCOHOL PREP PADS PadM       aspirin 81 MG Chew Take 81 mg by mouth once daily.      cetirizine (ZYRTEC) 10 MG tablet TAKE 1 TABLET (10 MG TOTAL) BY MOUTH DAILY AS NEEDED FOR ALLERGIES OR RHINITIS.  5    cilostazol (PLETAL) 50 MG Tab Take 1 tablet (50 mg total) by mouth once daily. 90 tablet 1    fluticasone (FLONASE) 50 mcg/actuation nasal spray USE 1 SPRAY BY EACH NARE ROUTE ONCE DAILY.  5    gabapentin (NEURONTIN) 300 MG capsule Take 1 capsule (300 mg total) by mouth every evening. 30 capsule 11    glipiZIDE (GLUCOTROL) 10 MG tablet Take 1 tablet (10 mg total) by mouth 2 (two) times  daily before meals. 180 tablet 3    ibuprofen (ADVIL,MOTRIN) 800 MG tablet Take 1 tablet (800 mg total) by mouth every 6 (six) hours as needed for Pain. 15 tablet 0    lancets 30 gauge Misc       lancing device Misc       lidocaine-prilocaine (EMLA) cream Apply topically as needed. To left shoulder 30 g 0    metoprolol tartrate (LOPRESSOR) 50 MG tablet Take 1 tablet (50 mg total) by mouth once daily. 90 tablet 1    nitroGLYCERIN (NITROSTAT) 0.4 MG SL tablet Place 1 tablet (0.4 mg total) under the tongue every 5 (five) minutes as needed for Chest pain. 1 Tablet, Sublingual Sublingual as needed .  as directed 90 tablet 0    NORMAL SALINE FLUSH 0.9 % injection       propylthiouracil (PTU) 50 mg Tab Please take 100 mg in am , 50 mg noon and 100 mg pm. 150 tablet 7    colchicine 0.6 mg tablet Take 2 tablets (1.2 mg total) by mouth once daily. At onset of gout symptoms. May repeat tablet every 12 hours 30 tablet 0     No current facility-administered medications on file prior to visit.      Allergies   Allergen Reactions    Pravastatin Other (See Comments)     Muscle pain     Past Surgical History:   Procedure Laterality Date    ABDOMINAL SURGERY      APPENDECTOMY      CARDIAC SURGERY  2008    CABG    CATARACT EXTRACTION Right     CATARACT EXTRACTION W/  INTRAOCULAR LENS IMPLANT  8/13/2015    Dr. Blount ( [facility])    EYE SURGERY  2011    Rt eye cataract    HERNIA REPAIR      HYSTERECTOMY      HYSTEROTOMY N/A 33 yrs old     Family History   Problem Relation Age of Onset    Diabetes Mother     Coronary artery disease Father     Cataracts Father     Heart disease Brother     Cataracts Sister     No Known Problems Maternal Aunt     No Known Problems Maternal Uncle     No Known Problems Paternal Aunt     No Known Problems Paternal Uncle     No Known Problems Maternal Grandmother     No Known Problems Maternal Grandfather     No Known Problems Paternal Grandmother     No Known Problems Paternal  "Grandfather     Amblyopia Neg Hx     Blindness Neg Hx     Cancer Neg Hx     Glaucoma Neg Hx     Hypertension Neg Hx     Macular degeneration Neg Hx     Retinal detachment Neg Hx     Strabismus Neg Hx     Stroke Neg Hx     Thyroid disease Neg Hx      Social History     Social History    Marital status:      Spouse name: N/A    Number of children: N/A    Years of education: N/A     Occupational History    Not on file.     Social History Main Topics    Smoking status: Current Every Day Smoker     Packs/day: 1.00    Smokeless tobacco: Current User    Alcohol use No    Drug use: No    Sexual activity: Not on file     Other Topics Concern    Not on file     Social History Narrative    No narrative on file       Review of Systems   Constitution: Negative for chills, fever and weakness.   Cardiovascular: Negative for chest pain, claudication and leg swelling.   Respiratory: Negative for cough and shortness of breath.    Hematologic/Lymphatic: Bruises/bleeds easily.   Skin: Positive for dry skin and nail changes. Negative for itching and rash.   Musculoskeletal: Positive for arthritis and joint pain. Negative for back pain, joint swelling and muscle weakness.   Gastrointestinal: Negative for diarrhea, nausea and vomiting.   Neurological: Positive for numbness, paresthesias and sensory change. Negative for tremors.   Psychiatric/Behavioral: Negative for altered mental status and hallucinations.           Objective:       Vitals:    07/11/17 1553   BP: (!) 133/55   Pulse: 80   Weight: 53.1 kg (117 lb)   Height: 5' 2" (1.575 m)   PainSc: 0-No pain       Physical Exam   Constitutional:   General: Pt. is well-developed, well-nourished, appears stated age, in no acute distress, alert and oriented x 3. No evidence of depression, anxiety, or agitation. Calm, cooperative, and communicative. Appropriate interactions and affect.       Cardiovascular:   Pulses:       Dorsalis pedis pulses are 1+ on the right " side, and 1+ on the left side.        Posterior tibial pulses are 1+ on the right side, and 1+ on the left side.   Dorsalis pedis and posterior tibial pulses are diminished Bilaterally. Toes are cool to touch. Feet are warm proximally.There is decreased digital hair. Skin is atrophic   Musculoskeletal:        Right ankle: She exhibits normal range of motion and no swelling. No tenderness. Achilles tendon exhibits no pain and no defect.        Left ankle: She exhibits normal range of motion and no swelling. No tenderness. Achilles tendon exhibits no pain and no defect.        Right foot: There is deformity (syndactyly of digits 2-3). There is normal range of motion and no tenderness.        Left foot: There is normal range of motion and no tenderness.   Adequate joint range of motion without pain, limitation, nor crepitation Bilateral feet and ankle joints. Muscle strength is 5/5 in all groups bilaterally.    Decreased stride, station of gait.  apropulsive toe off.  Increased angle and base of gait.    Patient has hammertoes of digits 2-5 bilateral partially reducible without symptom today.    Visible and palpable bunion without pain at dorsomedial 1st metatarsal head right and left.  Hallux abducted right and left partially reducible, tracks laterally without being track bound.  No ecchymosis, erythema, edema, or cardinal signs infection or signs of trauma same foot.    Fat pad atrophy to heels and met heads bilateral   Neurological: A sensory deficit is present.   [place] 5.07 monofilamant testing is diminished Juan feet. Sharp/dull sensation diminished Bilaterally. Light touch absent Bilaterally.       Skin: Skin is warm, dry and intact. No abrasion, no ecchymosis, no lesion and no rash noted. She is not diaphoretic. No cyanosis or erythema. No pallor. Nails show no clubbing.   Toenails 1-5 bilaterally are elongated by 2-3 mm, thickened by 2-3 mm, discolored/yellowed, dystrophic, brittle with subungual  debris. Tender to distal nail plate pressure of both hallux, without periungual skin abnormality of each.     Interdigital Spaces clean, dry and without evidence of break in skin integrity   Psychiatric: She has a normal mood and affect. Her speech is normal.   Nursing note and vitals reviewed.          Assessment:       Encounter Diagnoses   Name Primary?    Type II diabetes mellitus with peripheral circulatory disorder Yes    Onychomycosis due to dermatophyte          Plan:       Khloe was seen today for diabetes mellitus, diabetic foot exam and nail care.    Diagnoses and all orders for this visit:    Type II diabetes mellitus with peripheral circulatory disorder    Onychomycosis due to dermatophyte      I counseled the patient on her conditions, their implications and medical management.    - Shoe inspection. Diabetic Foot Education. Patient reminded of the importance of good nutrition and blood sugar control to help prevent podiatric complications of diabetes. Patient instructed on proper foot hygeine. We discussed wearing proper shoe gear, daily foot inspections, never walking without protective shoe gear, never putting sharp instruments to feet.    - With patient's permission, nails were aggressively reduced and debrided x 10 to their soft tissue attachment mechanically and with electric , removing all offending nail and debris. Patient relates relief following the procedure. She will continue to monitor the areas daily, inspect her feet, wear protective shoe gear when ambulatory, moisturizer to maintain skin integrity and follow in this office in approximately 3-4 months, sooner p.r.n.

## 2017-08-01 ENCOUNTER — OFFICE VISIT (OUTPATIENT)
Dept: FAMILY MEDICINE | Facility: CLINIC | Age: 77
End: 2017-08-01
Payer: MEDICARE

## 2017-08-01 VITALS
TEMPERATURE: 98 F | WEIGHT: 117.75 LBS | HEIGHT: 62 IN | DIASTOLIC BLOOD PRESSURE: 50 MMHG | DIASTOLIC BLOOD PRESSURE: 50 MMHG | SYSTOLIC BLOOD PRESSURE: 120 MMHG | HEART RATE: 69 BPM | OXYGEN SATURATION: 98 % | HEART RATE: 69 BPM | TEMPERATURE: 98 F | OXYGEN SATURATION: 98 % | BODY MASS INDEX: 20.86 KG/M2 | SYSTOLIC BLOOD PRESSURE: 120 MMHG | HEIGHT: 63 IN | BODY MASS INDEX: 21.67 KG/M2 | WEIGHT: 117.75 LBS

## 2017-08-01 DIAGNOSIS — M94.9 DISORDER OF BONE AND CARTILAGE: ICD-10-CM

## 2017-08-01 DIAGNOSIS — E11.69 COMBINED HYPERLIPIDEMIA ASSOCIATED WITH TYPE 2 DIABETES MELLITUS: Chronic | ICD-10-CM

## 2017-08-01 DIAGNOSIS — M62.838 NECK MUSCLE SPASM: ICD-10-CM

## 2017-08-01 DIAGNOSIS — Z00.00 ENCOUNTER FOR PREVENTIVE HEALTH EXAMINATION: Primary | ICD-10-CM

## 2017-08-01 DIAGNOSIS — I25.119 CORONARY ARTERY DISEASE INVOLVING NATIVE CORONARY ARTERY OF NATIVE HEART WITH ANGINA PECTORIS: Chronic | ICD-10-CM

## 2017-08-01 DIAGNOSIS — E78.2 COMBINED HYPERLIPIDEMIA ASSOCIATED WITH TYPE 2 DIABETES MELLITUS: Chronic | ICD-10-CM

## 2017-08-01 DIAGNOSIS — Z72.0 TOBACCO USE: ICD-10-CM

## 2017-08-01 DIAGNOSIS — H35.30 AMD (AGE-RELATED MACULAR DEGENERATION), BILATERAL: ICD-10-CM

## 2017-08-01 DIAGNOSIS — M89.9 DISORDER OF BONE AND CARTILAGE: ICD-10-CM

## 2017-08-01 DIAGNOSIS — M85.89 OSTEOPENIA OF MULTIPLE SITES: ICD-10-CM

## 2017-08-01 DIAGNOSIS — I70.0 ATHEROSCLEROSIS OF AORTA: Chronic | ICD-10-CM

## 2017-08-01 DIAGNOSIS — I10 ESSENTIAL HYPERTENSION: Chronic | ICD-10-CM

## 2017-08-01 DIAGNOSIS — I73.9 PERIPHERAL ARTERIAL DISEASE: Chronic | ICD-10-CM

## 2017-08-01 DIAGNOSIS — J30.89 ACUTE NON-SEASONAL ALLERGIC RHINITIS, UNSPECIFIED TRIGGER: ICD-10-CM

## 2017-08-01 DIAGNOSIS — M54.2 NECK PAIN: Primary | ICD-10-CM

## 2017-08-01 DIAGNOSIS — I20.9 ANGINA PECTORIS: Chronic | ICD-10-CM

## 2017-08-01 DIAGNOSIS — M19.90 OSTEOARTHRITIS, UNSPECIFIED OSTEOARTHRITIS TYPE, UNSPECIFIED SITE: ICD-10-CM

## 2017-08-01 DIAGNOSIS — E11.21 CONTROLLED TYPE 2 DIABETES MELLITUS WITH DIABETIC NEPHROPATHY, WITHOUT LONG-TERM CURRENT USE OF INSULIN: Chronic | ICD-10-CM

## 2017-08-01 DIAGNOSIS — E05.90 HYPERTHYROIDISM: Chronic | ICD-10-CM

## 2017-08-01 DIAGNOSIS — I65.29 ASYMPTOMATIC CAROTID ARTERY STENOSIS WITHOUT INFARCTION, UNSPECIFIED LATERALITY: ICD-10-CM

## 2017-08-01 PROCEDURE — 96160 PT-FOCUSED HLTH RISK ASSMT: CPT | Mod: S$GLB,,, | Performed by: NURSE PRACTITIONER

## 2017-08-01 PROCEDURE — 99499 UNLISTED E&M SERVICE: CPT | Mod: S$GLB,,, | Performed by: NURSE PRACTITIONER

## 2017-08-01 PROCEDURE — 1125F AMNT PAIN NOTED PAIN PRSNT: CPT | Mod: S$GLB,,, | Performed by: NURSE PRACTITIONER

## 2017-08-01 PROCEDURE — 1159F MED LIST DOCD IN RCRD: CPT | Mod: S$GLB,,, | Performed by: NURSE PRACTITIONER

## 2017-08-01 PROCEDURE — 99999 PR PBB SHADOW E&M-EST. PATIENT-LVL V: CPT | Mod: PBBFAC,,, | Performed by: NURSE PRACTITIONER

## 2017-08-01 PROCEDURE — 99214 OFFICE O/P EST MOD 30 MIN: CPT | Mod: 25,S$GLB,, | Performed by: NURSE PRACTITIONER

## 2017-08-01 PROCEDURE — 96372 THER/PROPH/DIAG INJ SC/IM: CPT | Mod: S$GLB,,, | Performed by: NURSE PRACTITIONER

## 2017-08-01 RX ORDER — RAMIPRIL 5 MG/1
CAPSULE ORAL
Refills: 1 | COMMUNITY
Start: 2017-07-18 | End: 2018-10-19

## 2017-08-01 RX ORDER — KETOROLAC TROMETHAMINE 30 MG/ML
30 INJECTION, SOLUTION INTRAMUSCULAR; INTRAVENOUS
Status: COMPLETED | OUTPATIENT
Start: 2017-08-01 | End: 2017-08-01

## 2017-08-01 RX ORDER — IBUPROFEN 800 MG/1
800 TABLET ORAL 3 TIMES DAILY
Qty: 60 TABLET | Refills: 0 | Status: SHIPPED | OUTPATIENT
Start: 2017-08-01 | End: 2018-06-28

## 2017-08-01 RX ORDER — TRAMADOL HYDROCHLORIDE 50 MG/1
50 TABLET ORAL EVERY 6 HOURS PRN
Qty: 30 TABLET | Refills: 0 | Status: SHIPPED | OUTPATIENT
Start: 2017-08-01 | End: 2017-08-11

## 2017-08-01 RX ORDER — TIZANIDINE 4 MG/1
4 TABLET ORAL EVERY 6 HOURS PRN
Qty: 30 TABLET | Refills: 0 | Status: SHIPPED | OUTPATIENT
Start: 2017-08-01 | End: 2017-08-11

## 2017-08-01 RX ORDER — KETOROLAC TROMETHAMINE 30 MG/ML
30 INJECTION, SOLUTION INTRAMUSCULAR; INTRAVENOUS
Status: DISCONTINUED | OUTPATIENT
Start: 2017-08-01 | End: 2017-08-01

## 2017-08-01 RX ADMIN — KETOROLAC TROMETHAMINE 30 MG: 30 INJECTION, SOLUTION INTRAMUSCULAR; INTRAVENOUS at 04:08

## 2017-08-01 NOTE — PROGRESS NOTES
Subjective:       Patient ID: Khloe Kendrick is a 77 y.o. female.    Chief Complaint: Neck Pain (Both)    77-year-old female presents to the clinic today complaining of neck pain for the past 2 weeks.  She states it got worse within the last couple of days.  She has taken ibuprofen 800 mg she says she does get some relief.  However last night the pain was so bad she couldn't sleep.  She denies any pain, numbness, tingling, or weakness to upper extremities.  She denies any known trauma.  She denies any cardiac chest pain, heart palpitations, shortness breath, swelling to lower extremities.  She denies any headaches or dizziness.  She has chronic blurred vision.  She rates her pain a 9 out of 10 at this time.      Neck Pain    Pertinent negatives include no chest pain, headaches, numbness or weakness.     Past Medical History:   Diagnosis Date    AMD (age-related macular degeneration), bilateral 6/10/2016    Angina pectoris     Cataract     Coronary artery disease     Diabetes mellitus, type 2     Hyperlipidemia     Hypertension     Hyperthyroidism 8/20/2015    Hypothyroidism     Osteoarthritis     Peripheral arterial disease 4/3/2013    Tobacco dependence     Type 2 diabetes mellitus     Type 2 diabetes with peripheral circulatory disorder, controlled      Past Surgical History:   Procedure Laterality Date    ABDOMINAL SURGERY      APPENDECTOMY      CARDIAC SURGERY  2008    CABG    CATARACT EXTRACTION Right     CATARACT EXTRACTION W/  INTRAOCULAR LENS IMPLANT  8/13/2015    Dr. Blount ( [facility])    EYE SURGERY  2011    Rt eye cataract    HERNIA REPAIR      HYSTERECTOMY      HYSTEROTOMY N/A 33 yrs old      reports that she has been smoking.  She has been smoking about 1.00 pack per day. She uses smokeless tobacco. She reports that she does not drink alcohol or use drugs.  Review of Systems   Respiratory: Negative for cough, shortness of breath and wheezing.    Cardiovascular: Negative for  chest pain, palpitations and leg swelling.   Gastrointestinal: Negative for abdominal pain, diarrhea and vomiting.   Musculoskeletal: Positive for neck pain.   Neurological: Negative for dizziness, weakness, light-headedness, numbness and headaches.       Objective:      Physical Exam   Constitutional: She is oriented to person, place, and time. She appears well-developed and well-nourished. No distress.   Eyes: Conjunctivae and EOM are normal. Pupils are equal, round, and reactive to light. Right eye exhibits no discharge. Left eye exhibits no discharge. No scleral icterus.   Neck: Neck supple.   c-spine tender to palpation tenderness also to bilateral paracervical muscle neck is very tight at this time but is supple    Cardiovascular: Normal rate and regular rhythm.  Exam reveals no gallop and no friction rub.    No murmur heard.  Pulmonary/Chest: Effort normal and breath sounds normal. No respiratory distress. She has no wheezes. She has no rales.   Abdominal: Soft. Bowel sounds are normal. There is no tenderness.   Musculoskeletal: She exhibits no tenderness.   Back non-tender to palpation    Neurological: She is alert and oriented to person, place, and time.   Skin: Skin is warm and dry. She is not diaphoretic.   Psychiatric: She has a normal mood and affect.       Assessment:       1. Neck pain    2. Neck muscle spasm        Plan:         Neck pain  -     Discontinue: ketorolac injection 30 mg; Inject 1 mL (30 mg total) into the muscle one time.  -     ibuprofen (ADVIL,MOTRIN) 800 MG tablet; Take 1 tablet (800 mg total) by mouth 3 (three) times daily.  Dispense: 60 tablet; Refill: 0  -     tramadol (ULTRAM) 50 mg tablet; Take 1 tablet (50 mg total) by mouth every 6 (six) hours as needed.  Dispense: 30 tablet; Refill: 0  -     ketorolac injection 30 mg; Inject 30 mg into the muscle one time.    Neck muscle spasm  -     tizanidine (ZANAFLEX) 4 MG tablet; Take 1 tablet (4 mg total) by mouth every 6 (six) hours as  needed.  Dispense: 30 tablet; Refill: 0

## 2017-08-01 NOTE — PROGRESS NOTES
Subjective:       Patient ID: Khloe Kendrick is a 77 y.o. female.    Chief Complaint: No chief complaint on file.    HPI  Past Medical History:   Diagnosis Date    AMD (age-related macular degeneration), bilateral 6/10/2016    Cataract     Coronary artery disease     Diabetes mellitus, type 2     Hyperlipidemia     Hypertension     Hyperthyroidism 8/20/2015    Osteoarthritis     Peripheral arterial disease 4/3/2013     Past Surgical History:   Procedure Laterality Date    ABDOMINAL SURGERY      APPENDECTOMY      CARDIAC SURGERY  2008    CABG    CATARACT EXTRACTION Right     CATARACT EXTRACTION W/  INTRAOCULAR LENS IMPLANT  8/13/2015    Dr. Blount ( [facility])    EYE SURGERY  2011    Rt eye cataract    HERNIA REPAIR      HYSTERECTOMY      HYSTEROTOMY N/A 33 yrs old      reports that she has been smoking.  She has been smoking about 1.00 pack per day. She uses smokeless tobacco. She reports that she does not drink alcohol or use drugs.  Review of Systems    Objective:      Physical Exam    Assessment:       No diagnosis found.    Plan:       ***    No Follow-up on file.

## 2017-08-01 NOTE — PROGRESS NOTES
Subjective:       Patient ID: Khloe Kendrick is a 77 y.o. female.    Chief Complaint: Neck Pain (Both)    HPI  Past Medical History:   Diagnosis Date    AMD (age-related macular degeneration), bilateral 6/10/2016    Angina pectoris     Cataract     Coronary artery disease     Diabetes mellitus, type 2     Hyperlipidemia     Hypertension     Hyperthyroidism 8/20/2015    Hypothyroidism     Osteoarthritis     Peripheral arterial disease 4/3/2013    Tobacco dependence     Type 2 diabetes mellitus     Type 2 diabetes with peripheral circulatory disorder, controlled      Past Surgical History:   Procedure Laterality Date    ABDOMINAL SURGERY      APPENDECTOMY      CARDIAC SURGERY  2008    CABG    CATARACT EXTRACTION Right     CATARACT EXTRACTION W/  INTRAOCULAR LENS IMPLANT  8/13/2015    Dr. Blount ( [facility])    EYE SURGERY  2011    Rt eye cataract    HERNIA REPAIR      HYSTERECTOMY      HYSTEROTOMY N/A 33 yrs old      reports that she has been smoking.  She has been smoking about 1.00 pack per day. She uses smokeless tobacco. She reports that she does not drink alcohol or use drugs.  Review of Systems    Objective:      Physical Exam    Assessment:       No diagnosis found.    Plan:       ***    No Follow-up on file.

## 2017-08-01 NOTE — PATIENT INSTRUCTIONS
Counseling and Referral of Other Preventative  (Italic type indicates deductible and co-insurance are waived)    Patient Name: Khloe Kendrick  Today's Date: 8/1/2017      SERVICE LIMITATIONS RECOMMENDATION    Vaccines    · Pneumococcal (once after 65)    · Influenza (annually)    · Hepatitis B (if medium/high risk)    · Prevnar 13      Hepatitis B medium/high risk factors:       - End-stage renal disease       - Hemophiliacs who received Factor VII or         IX concentrates       - Clients of institutions for the mentally             retarded       - Persons who live in the same house as          a HepB carrier       - Homosexual men       - Illicit injectable drug abusers     Pneumococcal: 9/4/2013     Influenza: 11/1/2016     Hepatitis B: N/A     Prevnar 13: 4/6/2016    Mammogram (biennial age 50-74)  Annually (age 40 or over)  9/8/2016    Pap (up to age 70 and after 70 if unknown history or abnormal study last 10 years)    N/A     The USPSTF recommends against screening for cervical cancer in women older than age 65 years who have had adequate prior screening and are not otherwise at high risk for cervical cancer.      Colorectal cancer screening (to age 75)    · Fecal occult blood test (annual)  · Flexible sigmoidoscopy (5y)  · Screening colonoscopy (10y)  · Barium enema   N/A    Diabetes self-management training (no USPSTF recommendations)  Requires referral by treating physician for patient with diabetes or renal disease. 10 hours of initial DSMT sessions of no less than 30 minutes each in a continuous 12-month period. 2 hours of follow-up DSMT in subsequent years.  N/A    Bone mass measurements (age 65 & older, biennial)  Requires diagnosis related to osteoporosis or estrogen deficiency. Biennial benefit unless patient has history of long-term glucocorticoid  1/16/2014    Glaucoma screening (no USPSTF recommendation)  Diabetes mellitus, family history   , age 50 or over    American,  age 65 or over  8/2/2016    Medical nutrition therapy for diabetes or renal disease (no recommended schedule)  Requires referral by treating physician for patient with diabetes or renal disease or kidney transplant within the past 3 years.  Can be provided in same year as diabetes self-management training (DSMT), and CMS recommends medical nutrition therapy take place after DSMT. Up to 3 hours for initial year and 2 hours in subsequent years.  N/A    Cardiovascular screening blood tests (every 5 years)  · Fasting lipid panel  Order as a panel if possible  8/24/2016    Diabetes screening tests (at least every 3 years, Medicare covers annually or at 6-month intervals for prediabetic patients)  · Fasting blood sugar (FBS) or glucose tolerance test (GTT)  Patient must be diagnosed with one of the following:       - Hypertension       - Dyslipidemia       - Obesity (BMI 30kg/m2)       - Previous elevated impaired FBS or GTT       ... or any two of the following:       - Overweight (BMI 25 but <30)       - Family history of diabetes       - Age 65 or older       - History of gestational diabetes or birth of baby weighing more than 9 pounds  421/2017    Abdominal aortic aneurysm screening (once)  · Sonogram   Limited to patients who meet one of the following criteria:       - Men who are 65-75 years old and have smoked more than 100 cigarette in their lifetime       - Anyone with a family history of abdominal aortic aneurysm       - Anyone recommended for screening by the USPSTF  N/A    HIV screening (annually for increased risk patients)  · HIV-1 and HIV-2 by EIA, or ARSEN, rapid antibody test or oral mucosa transudate  Patients must be at increased risk for HIV infection per USPSTF guidelines or pregnant. Tests covered annually for patient at increased risk or as requested by the patient. Pregnant patients may receive up to 3 tests during pregnancy.  Risks discussed, screening is not recommended    Smoking cessation  counseling (up to 8 sessions per year)  Patients must be asymptomatic of tobacco-related conditions to receive as a preventative service.  Referred to Tobacco Cessation Program.    Subsequent annual wellness visit  At least 12 months since last AWV  Return in one year     The following information is provided to all patients.  This information is to help you find resources for any of the problems found today that may be affecting your health:                Living healthy guide: www.[email or web address].[place].[facility]      Understanding Diabetes: www.diabetes.org      Eating healthy: www.cdc.gov/healthyweight      [facility] home safety checklist: www.cdc.gov/steadi/patient.html      Agency on Aging: www.goea.[place].[facility]      Alcoholics anonymous (AA): www.aa.org      Physical Activity: www.tony.nih.gov/zi6hbdu      Tobacco use: www.quitwithusla.org

## 2017-08-01 NOTE — PROGRESS NOTES
"Khloe Kendrick presented for a  Medicare AWV and comprehensive Health Risk Assessment today. The following components were reviewed and updated:    · Medical history  · Family History  · Social history  · Allergies and Current Medications  · Health Risk Assessment  · Health Maintenance  · Care Team     ** See Completed Assessments for Annual Wellness Visit within the encounter summary.**       The following assessments were completed:  · Living Situation  · CAGE  · Depression Screening  · Timed Get Up and Go  · Whisper Test  · Cognitive Function Screening  · Nutrition Screening  · ADL Screening  · PAQ Screening    Vitals:    08/01/17 1513   BP: (!) 120/50   BP Location: Left arm   Patient Position: Sitting   BP Method: Manual   Pulse: 69   Temp: 98 °F (36.7 °C)   TempSrc: Oral   SpO2: 98%   Weight: 53.4 kg (117 lb 11.6 oz)   Height: 5' 3" (1.6 m)     Body mass index is 20.85 kg/m².  Physical Exam   Constitutional: She is oriented to person, place, and time. She appears well-developed and well-nourished. No distress.   Eyes: Conjunctivae and EOM are normal. Pupils are equal, round, and reactive to light. Right eye exhibits no discharge. Left eye exhibits no discharge. No scleral icterus.   Cardiovascular: Normal rate, regular rhythm and normal heart sounds.  Exam reveals no gallop and no friction rub.    No murmur heard.  Pulmonary/Chest: Effort normal and breath sounds normal. No respiratory distress. She has no wheezes. She has no rales.   Abdominal: Soft. Bowel sounds are normal. There is no tenderness.   Musculoskeletal: Normal range of motion. She exhibits no edema.   Neurological: She is alert and oriented to person, place, and time.   Skin: Skin is warm and dry. She is not diaphoretic.   Psychiatric: She has a normal mood and affect.         Diagnoses and health risks identified today and associated recommendations/orders:    1. Encounter for preventive health examination  - dexa scan ordered   - Health " maintenance UTD    2. Angina pectoris  - The current medical regimen is effective;  continue present plan and medications.    3. Atherosclerosis of aorta  -Stable / Asymptomatic     4. Combined hyperlipidemia associated with type 2 diabetes mellitus  - unable to tolerated statins    5. Coronary artery disease involving native coronary artery of native heart with angina pectoris  - The current medical regimen is effective;  continue present plan and medications.    6. Controlled type 2 diabetes mellitus with diabetic nephropathy, without long-term current use of insulin  - The current medical regimen is effective;  continue present plan and medications.    7. Essential hypertension  - The current medical regimen is effective;  continue present plan and medications    8. Hyperthyroidism  - followed by endocrinology     9. Peripheral arterial disease  - The current medical regimen is effective;  continue present plan and medications.    10. Acute non-seasonal allergic rhinitis, unspecified trigger  - The current medical regimen is effective;  continue present plan and medications.    11. AMD (age-related macular degeneration), bilateral  - The current medical regimen is effective;  continue present plan and medications.    12. Asymptomatic carotid artery stenosis without infarction, unspecified laterality  - stable asymptomatic observe    13. Osteoarthritis, unspecified osteoarthritis type, unspecified site  - The current medical regimen is effective;  continue present plan and medications.    14. Osteopenia of multiple sites  - tale calcium and vitamin d    15. Tobacco use  - refer to smoking cessation       Provided June with a 5-10 year written screening schedule and personal prevention plan. Recommendations were developed using the USPSTF age appropriate recommendations. Education, counseling, and referrals were provided as needed. After Visit Summary printed and given to patient which includes a list of additional  screenings\tests needed.        Galileo Gardiner, FNP-C

## 2017-08-02 ENCOUNTER — TELEPHONE (OUTPATIENT)
Dept: ENDOCRINOLOGY | Facility: CLINIC | Age: 77
End: 2017-08-02

## 2017-08-02 NOTE — TELEPHONE ENCOUNTER
Left message for Ms. Kendrick and her daughter Ms. Haywood (her daughter) regarding scheduling treatment for graves' disease based on thyroid scan done 8/2016.     PLAN:  Repeat TFTs, given age would want to have her labs near normal prior to GALVIN treatment for Graves' to avoid complications of post GALVIN thyroiditis.  Stop PTU three days before GALVIN treatment   Repeat TFTs six weeks after GALVIN treatment

## 2017-08-10 ENCOUNTER — OUTPATIENT CASE MANAGEMENT (OUTPATIENT)
Dept: ADMINISTRATIVE | Facility: OTHER | Age: 77
End: 2017-08-10

## 2017-08-10 ENCOUNTER — HOSPITAL ENCOUNTER (OUTPATIENT)
Dept: RADIOLOGY | Facility: CLINIC | Age: 77
Discharge: HOME | End: 2017-08-10
Attending: NURSE PRACTITIONER
Payer: MEDICARE

## 2017-08-10 DIAGNOSIS — M89.9 DISORDER OF BONE AND CARTILAGE: ICD-10-CM

## 2017-08-10 DIAGNOSIS — M94.9 DISORDER OF BONE AND CARTILAGE: ICD-10-CM

## 2017-08-10 PROCEDURE — 77080 DXA BONE DENSITY AXIAL: CPT | Mod: 26,,, | Performed by: INTERNAL MEDICINE

## 2017-08-10 PROCEDURE — 77080 DXA BONE DENSITY AXIAL: CPT | Mod: TC,PO

## 2017-08-10 NOTE — PROGRESS NOTES
For your information:    The following patient has been assigned to Lauren Hercules, RN  with Outpatient Complex Care [facility] for high risk screening.    Reason: High Risk    Please contact [facility] at [phone] with any questions.    Thank you,  Anuradha Cazares ,SSC

## 2017-08-11 ENCOUNTER — CLINICAL SUPPORT (OUTPATIENT)
Dept: SMOKING CESSATION | Facility: CLINIC | Age: 77
End: 2017-08-11
Payer: COMMERCIAL

## 2017-08-11 DIAGNOSIS — F17.200 NICOTINE DEPENDENCE: Primary | ICD-10-CM

## 2017-08-11 PROCEDURE — 99404 PREV MED CNSL INDIV APPRX 60: CPT | Mod: S$GLB,,,

## 2017-08-11 PROCEDURE — 99999 PR PBB SHADOW E&M-EST. PATIENT-LVL I: CPT | Mod: PBBFAC,,,

## 2017-08-11 RX ORDER — IBUPROFEN 200 MG
1 TABLET ORAL DAILY
Qty: 14 PATCH | Refills: 0 | Status: SHIPPED | OUTPATIENT
Start: 2017-08-11 | End: 2017-08-24 | Stop reason: SDUPTHER

## 2017-08-11 NOTE — Clinical Note
Pt presents for intake smoking 1.25 packs of cigarettes per day, after discussion and assessment recommend the 21mg nicotine patch daily with rate reduction, recommend no more then 5-8 cigarettes per day, pt will start tomorrow with her quit,  discussed strategies to help cut back and to help break the routine and habit associated with smoking, intake handout provided to the patient and discussed, all prescribed NRT discussed in depth as well as tobacco cessation medication s/e as well as usage discussed, contact card provided to the patient. Will continue to follow every two weeks while in the program.

## 2017-08-16 ENCOUNTER — PATIENT MESSAGE (OUTPATIENT)
Dept: ENDOCRINOLOGY | Facility: CLINIC | Age: 77
End: 2017-08-16

## 2017-08-17 ENCOUNTER — TELEPHONE (OUTPATIENT)
Dept: FAMILY MEDICINE | Facility: CLINIC | Age: 77
End: 2017-08-17

## 2017-08-18 ENCOUNTER — TELEPHONE (OUTPATIENT)
Dept: FAMILY MEDICINE | Facility: CLINIC | Age: 77
End: 2017-08-18

## 2017-08-18 DIAGNOSIS — M85.80 OSTEOPENIA, UNSPECIFIED LOCATION: Primary | ICD-10-CM

## 2017-08-18 RX ORDER — ALENDRONATE SODIUM 70 MG/1
70 TABLET ORAL
Qty: 12 TABLET | Refills: 3 | Status: SHIPPED | OUTPATIENT
Start: 2017-08-18 | End: 2018-07-13

## 2017-08-18 NOTE — TELEPHONE ENCOUNTER
I spoke with the patient and explained that her bone dexa scan showed osteopenia and recommended treatment. I explain to her how to take Fosamax. Patient verbalized understanding of above. She said she is taking calcium but not any Vitamin D. I recommended starting Vitamin D 2,000 IU daily. I also recommended weight bearing exercises. We need to repeat your bone dexa in 2 years.

## 2017-08-21 ENCOUNTER — OUTPATIENT CASE MANAGEMENT (OUTPATIENT)
Dept: ADMINISTRATIVE | Facility: OTHER | Age: 77
End: 2017-08-21

## 2017-08-21 NOTE — PROGRESS NOTES
Pt reports that she gives permission to speak with her daughter Eben Caro. Pt reports that she lives in the home with [place] and her family. Pt reports that she takes her medications as ordered. Pt reports that her daughter places her medications into a pillbox. Pt reports that she is able to perform her ADL's. Pt reports that the daughter helps with her iADL's. Pt reports that she no longer uses a cane. Pt reports that she no longer uses a cane to leave the home. Pt reports that she tries to follow a diabetic diet at home. Pt reports that she does not use any salt in her foods. Pt reports that she does not check her blood pressure at home.Pt reports that she just had surgery and a staph infection of the bone. Pt reports that she has pain in her neck. Pt reports that applying heat/cold helps for a short period of time.Pt reports that she started the nicoderm ptches last week.

## 2017-08-24 ENCOUNTER — CLINICAL SUPPORT (OUTPATIENT)
Dept: SMOKING CESSATION | Facility: CLINIC | Age: 77
End: 2017-08-24
Payer: COMMERCIAL

## 2017-08-24 DIAGNOSIS — F17.200 NICOTINE DEPENDENCE: ICD-10-CM

## 2017-08-24 PROCEDURE — 99999 PR PBB SHADOW E&M-EST. PATIENT-LVL I: CPT | Mod: PBBFAC,,,

## 2017-08-24 PROCEDURE — 99402 PREV MED CNSL INDIV APPRX 30: CPT | Mod: S$GLB,,,

## 2017-08-24 RX ORDER — IBUPROFEN 200 MG
1 TABLET ORAL DAILY
Qty: 14 PATCH | Refills: 0 | Status: SHIPPED | OUTPATIENT
Start: 2017-08-24 | End: 2017-09-07 | Stop reason: SDUPTHER

## 2017-08-24 NOTE — Clinical Note
smoking 8 cigarettes per day, recommend she continue with the 21mg nicotine patch daily, will continue the 21mg nicotine patch, patient is having issues with the patches staying in place, therefore will try the clear version of the nicotine patch and advised her to use medical tape to help further secure the patch, goal set for 4-6 cigarettes over the next two weeks, session handout provided and discussed, will continue to monitor and follow

## 2017-08-24 NOTE — PROGRESS NOTES
Individual Follow-Up Form    8/24/2017    Quit Date: not selected at this time    Clinical Status of Patient: Outpatient    Length of Service: 30 minutes    Continuing Medication: yes  Patches    Other Medications: n/a     Target Symptoms: Withdrawal and medication side effects. The following were  rated moderate (3) to severe (4) on TCRS:  · Moderate (3): 0  · Severe (4): 0    Comments: smoking 8 cigarettes per day, recommend she continue with the 21mg nicotine patch daily, will continue the 21mg nicotine patch, patient is having issues with the patches staying in place, therefore will try the clear version of the nicotine patch and advised her to use medical tape to help further secure the patch, goal set for 4-6 cigarettes over the next two weeks, session handout provided and discussed, will continue to monitor and follow     Diagnosis: F17.210    Next Visit: 2 weeks

## 2017-08-28 ENCOUNTER — OFFICE VISIT (OUTPATIENT)
Dept: OPHTHALMOLOGY | Facility: CLINIC | Age: 77
End: 2017-08-28
Payer: MEDICARE

## 2017-08-28 DIAGNOSIS — H43.822 VITREOMACULAR TRACTION SYNDROME OF LEFT EYE: ICD-10-CM

## 2017-08-28 DIAGNOSIS — H35.3134 NONEXUDATIVE AGE-RELATED MACULAR DEGENERATION, BILATERAL, ADVANCED ATROPHIC WITH SUBFOVEAL INVOLVEMENT: Primary | ICD-10-CM

## 2017-08-28 PROCEDURE — 92014 COMPRE OPH EXAM EST PT 1/>: CPT | Mod: S$GLB,,, | Performed by: OPHTHALMOLOGY

## 2017-08-28 PROCEDURE — 99999 PR PBB SHADOW E&M-EST. PATIENT-LVL III: CPT | Mod: PBBFAC,,, | Performed by: OPHTHALMOLOGY

## 2017-08-28 PROCEDURE — 92226 PR SPECIAL EYE EXAM, SUBSEQUENT: CPT | Mod: 50,S$GLB,, | Performed by: OPHTHALMOLOGY

## 2017-08-28 PROCEDURE — 92134 CPTRZ OPH DX IMG PST SGM RTA: CPT | Mod: S$GLB,,, | Performed by: OPHTHALMOLOGY

## 2017-08-28 NOTE — PROGRESS NOTES
HPI     DLS  8/2/16---Dr. Chavez     Pt states her VA OU has slowly worsened since last visit.  She saw [facility]-was given a light and a  Magnifier for assistance.     Pt lives on the [facility]- her daughter with her today, [place]  They would like to be seen on the WB.          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 DRY ARMD OU  - no active fluid on exam  - start AREDS smokers formula  - [place] Grid  - advised smoking cessation    2. VMT OS  - monitor mild      3. PSK OU  - monitor      RTC 12 mo or prn worsening symptoms w/ OCT - ok for WB with Dr. CEJA      [facility] referral

## 2017-08-30 RX ORDER — LIDOCAINE AND PRILOCAINE 25; 25 MG/G; MG/G
CREAM TOPICAL
Qty: 30 G | Refills: 0 | Status: SHIPPED | OUTPATIENT
Start: 2017-08-30 | End: 2018-06-28

## 2017-09-01 ENCOUNTER — OUTPATIENT CASE MANAGEMENT (OUTPATIENT)
Dept: ADMINISTRATIVE | Facility: OTHER | Age: 77
End: 2017-09-01

## 2017-09-01 NOTE — PROGRESS NOTES
Pt reports that her blood sugars are good. Pt reports her blood pressures are doing good. Pt reports that she has not received the packaged that was mailed.Reviewed with patient the risk factors of HTN. Pt reports that she is trying to follow a low sodium diet. Pt reports that she has pains in her neck. Pt reports that the pain started about 2 months ago. Pt reports that she has an upcoming appt with Dr. Marquis. Plan: continue reviewing the risk factors of HTN and resend package.

## 2017-09-01 NOTE — LETTER
September 1, 2017    Khloe Kendrick  [address] [place] [address]             [facility]  [address] [facility] [place] [address] Dear Ms. Khloe Kendrick,    You have been enrolled in [facility]. I will provide you with additional information about your hypertension, medications, and treatments.      If you have any questions or concerns please call me at [phone].      Sincerely,        Lauren Hercules RN

## 2017-09-01 NOTE — PATIENT INSTRUCTIONS
Controlling High Blood Pressure  High blood pressure (hypertension) is often called the silent killer. This is because many people who have it dont know it. High blood pressure is defined as 140/90 mm Hg or higher. Know your blood pressure and remember to check it regularly. Doing so can save your life. Here are some things you can do to help control your blood pressure.    Choose heart-healthy foods  · Select low-salt, low-fat foods. Limit sodium intake to 2,400 mg per day or the amount suggested by your healthcare provider.  · Limit canned, dried, cured, packaged, and fast foods. These can contain a lot of salt.  · Eat 8 to 10 servings of fruits and vegetables every day.  · Choose lean meats, fish, or chicken.  · Eat whole-grain pasta, brown rice, and beans.  · Eat 2 to 3 servings of low-fat or fat-free dairy products.  · Ask your doctor about the DASH eating plan. This plan helps reduce blood pressure.  · When you go to a restaurant, ask that your meal be prepared with no added salt.  Maintain a healthy weight  · Ask your healthcare provider how many calories to eat a day. Then stick to that number.  · Ask your healthcare provider what weight range is healthiest for you. If you are overweight, a weight loss of only 3% to 5% of your body weight can help lower blood pressure. Generally, a good weight loss goal is to lose 10% of your body weight in a year.  · Limit snacks and sweets.  · Get regular exercise.  Get up and get active  · Choose activities you enjoy. Find ones you can do with friends or family. This includes bicycling, dancing, walking, and jogging.  · Park farther away from building entrances.  · Use stairs instead of the elevator.  · When you can, walk or bike instead of driving.  · [place] leaves, garden, or do household repairs.  · Be active at a moderate to vigorous level of physical activity for at least 40 minutes for a minimum of 3 to 4 days a week.   Manage stress  · Make time to relax and enjoy  life. Find time to laugh.  · Communicate your concerns with your loved ones and your healthcare provider.  · Visit with family and friends, and keep up with hobbies.  Limit alcohol and quit smoking  · Men should have no more than 2 drinks per day.  · Women should have no more than 1 drink per day.  · Talk with your healthcare provider about quitting smoking. Smoking significantly increases your risk for heart disease and stroke. Ask your healthcare provider about community smoking cessation programs and other options.  Medicines  If lifestyle changes arent enough, your healthcare provider may prescribe high blood pressure medicine. Take all medicines as prescribed. If you have any questions about your medicines, ask your healthcare provider before stopping or changing them.   Date Last Reviewed: 4/27/2016 © 2000-2016 [employer]. [address] [place] [address], [place] [address]. All rights reserved. This information is not intended as a substitute for professional medical care. Always follow your healthcare professional's instructions.        What is High Blood Pressure?  High blood pressure (also called hypertension) is known as the silent killer. This is because most of the time it doesnt cause symptoms. In fact, many people dont know they have it until other problems develop. In most cases, high blood pressure cant be cured. Its a disease that often requires lifelong treatment. The good news is that it can be managed.  Understanding blood pressure  The circulatory system is made up of the heart and blood vessels that carry blood through the body. Your heart is the pump for this system. With each heartbeat (contraction), the heart sends blood out through large blood vessels called arteries. Blood pressure is a measure of how hard the moving blood pushes against the walls of the arteries.  High blood pressure can harm your health  In a healthy blood vessel, the blood moves smoothly through the vessel  "and puts normal pressure on the vessel walls.       High blood pressure occurs when blood pushes too hard against artery walls. This causes damage to the artery walls and then the formation of scar tissue as it heals. This makes the arteries stiff and weak. Plaque sticks to the scarred tissue narrowing and hardening the arteries. High blood pressure also causes your heart to work harder to get blood out to the body. High blood pressure raises your risk of heart attack, also known as acute myocardial infarction, or AMI, and stroke. It can also lead to kidney disease, and blindness.  Measuring blood pressure  An example of a blood pressure measurement is 120/70 (120 over 70). The top number is the pressure of blood against the artery walls during a heartbeat (systolic). The bottom number is the pressure of blood against artery walls between heartbeats (diastolic). Talk with your healthcare provider to find out what your blood pressure goals should be.   Controlling blood pressure  If your blood pressure is too high, work with your doctor on a plan for lowering it. Below are steps you can take that will help lower your blood pressure.  · Choose heart-healthy foods. Eating healthier meals helps you control your blood pressure. Ask your doctor about the DASH eating plan. This plan helps reduce blood pressure by limiting the amount of sodium (salt) you have in your diet. DASH also encourages eating plenty of fruits and vegetables, low-fat or non-fat dairy, whole-grains, and foods high in fiber, and low in fat.  · Reduce sodium. Reducing sodium in your diet reduces fluid retention. Fluid retention caused by too much salt increases blood volume and blood pressure. The American Heart Associations (AHA) "ideal" sodium intake recommendation is 1,500 milligrams per day.  However, since American's eat so much salt, the AHA says a positive change can occur by cutting back to even 2,400 milligrams of sodium a day.  · Maintain a " healthy weight. Being overweight makes you more likely to have high blood pressure. Losing excess weight helps lower blood pressure.  · Exercise regularly. Daily exercise helps your heart and blood vessels work better and stay healthier. It can help lower your blood pressure.  · Stop smoking. Smoking increases blood pressure and damages blood vessels.  · Limit alcohol. Drinking too much alcohol can raise blood pressure. Men should have no more than 2 drinks a day. Women should have no more than 1. (A drink is equal to 1 beer, or a small glass of wine, or a shot of liquor.)  · Control stress. Stress makes your heart work harder and beat faster. Controlling stress helps you control your blood pressure.  Facts about high blood pressure  · Feeling OK does not mean that blood pressure is under control. Likewise, feeling bad doesnt mean its out of control. The only way to know for sure is to check your pressure regularly.  · Medicine is only one part of controlling high blood pressure. You also need to manage your weight, get regular exercise, and adjust your eating habits.  · High blood pressure is usually a lifelong problem. But it can be controlled with healthy lifestyle changes and medicine.  · Hypertension is not the same as stress. Although stress may be a factor in high blood pressure, its only one part of the story.  · Blood pressure medicines need to be taken every day. Stopping suddenly may cause a dangerous increase in pressure.   Date Last Reviewed: 4/27/2016 © 2000-2016 [employer]. [address] [place] [address]. All rights reserved. This information is not intended as a substitute for professional medical care. Always follow your healthcare professional's instructions.        Your High Blood Pressure Risk Factors  Risk factors are things that make you more likely to have a disease or condition. Do you know your risk factors for high blood pressure? You cant do anything about  some risk factors. But other risk factors are things that can be changed. Know what high blood pressure risk factors you have. Then find out what changes you can make to help control your risk for high blood pressure. Start with the change that you think will be easiest for you.  Risk factors you cant control  Though you cant change any of the things listed below, check off the ones that apply to you. The more boxes you check, the greater your risk for high blood pressure.  Family history  ? One or both of your parents or grandparents has had high blood pressure or heart disease.  Gender and age  ? Youre a man over age 55 or a postmenopausal woman.  Risk factors you can control  There are plenty of risk factors for high blood pressure that you can control. Learn what these risk factors are and then find out how to reduce your risk. Check the ones that apply to you.  ? What you eat  Do you eat a lot of salty, fatty, fried, or greasy foods? Do you go to restaurants or eat out frequently?  ? Alcohol consumption  Do you drink more than 1 drink a day if you are a female or more than 2 drinks a day if you male?   ? If you smoke or someone close to you smokes  Do you smoke cigarettes or cigars, chew tobacco, or dip snuff? Are you exposed to second-hand smoke on a regular basis?  ? How active you are   Are you inactive most of the time at work and at home? Do you go weeks without exercising?  ? Your weight   Has your doctor said that you are 15 or more pounds overweight?  ? Your stress level    Do you often feel anxious, nervous, and stressed? Do you feel that you don't have a supportive environment?  Date Last Reviewed: 4/27/2016 © 2000-2016 [employer]. [address] [place] [address], [place] [address]. All rights reserved. This information is not intended as a substitute for professional medical care. Always follow your healthcare professional's instructions.

## 2017-09-06 ENCOUNTER — OFFICE VISIT (OUTPATIENT)
Dept: FAMILY MEDICINE | Facility: CLINIC | Age: 77
End: 2017-09-06
Payer: MEDICARE

## 2017-09-06 ENCOUNTER — TELEPHONE (OUTPATIENT)
Dept: FAMILY MEDICINE | Facility: CLINIC | Age: 77
End: 2017-09-06

## 2017-09-06 ENCOUNTER — HOSPITAL ENCOUNTER (OUTPATIENT)
Dept: RADIOLOGY | Facility: HOSPITAL | Age: 77
Discharge: HOME | End: 2017-09-06
Attending: FAMILY MEDICINE
Payer: MEDICARE

## 2017-09-06 VITALS
WEIGHT: 114.44 LBS | DIASTOLIC BLOOD PRESSURE: 60 MMHG | HEIGHT: 62 IN | BODY MASS INDEX: 21.06 KG/M2 | OXYGEN SATURATION: 97 % | TEMPERATURE: 98 F | HEART RATE: 90 BPM | SYSTOLIC BLOOD PRESSURE: 160 MMHG

## 2017-09-06 DIAGNOSIS — M10.9 GOUT, UNSPECIFIED CAUSE, UNSPECIFIED CHRONICITY, UNSPECIFIED SITE: ICD-10-CM

## 2017-09-06 DIAGNOSIS — M54.2 NECK PAIN: ICD-10-CM

## 2017-09-06 DIAGNOSIS — H81.10 BPPV (BENIGN PAROXYSMAL POSITIONAL VERTIGO), UNSPECIFIED LATERALITY: ICD-10-CM

## 2017-09-06 DIAGNOSIS — E11.21 CONTROLLED TYPE 2 DIABETES MELLITUS WITH DIABETIC NEPHROPATHY, WITHOUT LONG-TERM CURRENT USE OF INSULIN: Chronic | ICD-10-CM

## 2017-09-06 DIAGNOSIS — M47.812 OSTEOARTHRITIS OF CERVICAL SPINE, UNSPECIFIED SPINAL OSTEOARTHRITIS COMPLICATION STATUS: ICD-10-CM

## 2017-09-06 DIAGNOSIS — E11.69 COMBINED HYPERLIPIDEMIA ASSOCIATED WITH TYPE 2 DIABETES MELLITUS: Chronic | ICD-10-CM

## 2017-09-06 DIAGNOSIS — I10 ESSENTIAL HYPERTENSION: Chronic | ICD-10-CM

## 2017-09-06 DIAGNOSIS — I73.9 PERIPHERAL ARTERIAL DISEASE: Chronic | ICD-10-CM

## 2017-09-06 DIAGNOSIS — M62.838 NECK MUSCLE SPASM: ICD-10-CM

## 2017-09-06 DIAGNOSIS — E78.2 COMBINED HYPERLIPIDEMIA ASSOCIATED WITH TYPE 2 DIABETES MELLITUS: Chronic | ICD-10-CM

## 2017-09-06 DIAGNOSIS — I70.0 ATHEROSCLEROSIS OF AORTA: Chronic | ICD-10-CM

## 2017-09-06 DIAGNOSIS — I65.23 CAROTID ARTERY CALCIFICATION, BILATERAL: ICD-10-CM

## 2017-09-06 DIAGNOSIS — E05.90 HYPERTHYROIDISM: Chronic | ICD-10-CM

## 2017-09-06 DIAGNOSIS — M62.838 NECK MUSCLE SPASM: Primary | ICD-10-CM

## 2017-09-06 PROCEDURE — 72040 X-RAY EXAM NECK SPINE 2-3 VW: CPT | Mod: 26,,, | Performed by: RADIOLOGY

## 2017-09-06 PROCEDURE — 3078F DIAST BP <80 MM HG: CPT | Mod: S$GLB,,, | Performed by: FAMILY MEDICINE

## 2017-09-06 PROCEDURE — 99214 OFFICE O/P EST MOD 30 MIN: CPT | Mod: S$GLB,,, | Performed by: FAMILY MEDICINE

## 2017-09-06 PROCEDURE — 3008F BODY MASS INDEX DOCD: CPT | Mod: S$GLB,,, | Performed by: FAMILY MEDICINE

## 2017-09-06 PROCEDURE — 1159F MED LIST DOCD IN RCRD: CPT | Mod: S$GLB,,, | Performed by: FAMILY MEDICINE

## 2017-09-06 PROCEDURE — 1125F AMNT PAIN NOTED PAIN PRSNT: CPT | Mod: S$GLB,,, | Performed by: FAMILY MEDICINE

## 2017-09-06 PROCEDURE — 99999 PR PBB SHADOW E&M-EST. PATIENT-LVL V: CPT | Mod: PBBFAC,,, | Performed by: FAMILY MEDICINE

## 2017-09-06 PROCEDURE — 72040 X-RAY EXAM NECK SPINE 2-3 VW: CPT | Mod: TC,PO

## 2017-09-06 PROCEDURE — 99499 UNLISTED E&M SERVICE: CPT | Mod: S$GLB,,, | Performed by: FAMILY MEDICINE

## 2017-09-06 PROCEDURE — 3077F SYST BP >= 140 MM HG: CPT | Mod: S$GLB,,, | Performed by: FAMILY MEDICINE

## 2017-09-06 RX ORDER — TIZANIDINE 4 MG/1
4 TABLET ORAL EVERY 6 HOURS PRN
COMMUNITY
End: 2017-09-06 | Stop reason: SDUPTHER

## 2017-09-06 RX ORDER — ALLOPURINOL 100 MG/1
100 TABLET ORAL DAILY
Qty: 90 TABLET | Refills: 1 | Status: SHIPPED | OUTPATIENT
Start: 2017-09-06 | End: 2018-06-28

## 2017-09-06 RX ORDER — TRAMADOL HYDROCHLORIDE 50 MG/1
50 TABLET ORAL EVERY 6 HOURS PRN
Qty: 60 TABLET | Refills: 0 | Status: SHIPPED | OUTPATIENT
Start: 2017-09-06 | End: 2018-06-28

## 2017-09-06 RX ORDER — TIZANIDINE 4 MG/1
4 TABLET ORAL EVERY 8 HOURS
Qty: 90 TABLET | Refills: 1 | Status: SHIPPED | OUTPATIENT
Start: 2017-09-06 | End: 2018-06-28

## 2017-09-06 RX ORDER — TRAMADOL HYDROCHLORIDE 50 MG/1
50 TABLET ORAL EVERY 6 HOURS PRN
COMMUNITY
End: 2017-09-06 | Stop reason: SDUPTHER

## 2017-09-06 RX ORDER — MECLIZINE HYDROCHLORIDE 25 MG/1
25 TABLET ORAL 3 TIMES DAILY PRN
Qty: 90 TABLET | Refills: 1 | Status: SHIPPED | OUTPATIENT
Start: 2017-09-06 | End: 2018-06-28

## 2017-09-06 NOTE — TELEPHONE ENCOUNTER
----- Message from Aimee Marquis MD sent at 9/6/2017  4:08 PM CDT -----  No fracture or dislocation. There there is arthritis noted in the cervical spine.   Carotid calcification noted. Ultrasound for carotids were ordered.

## 2017-09-06 NOTE — TELEPHONE ENCOUNTER
Spoke to patients daughter Jennifer with verbal consent from patient. Notified about Xray results.

## 2017-09-06 NOTE — PATIENT INSTRUCTIONS
Nonsurgical Treatment of Cervical Spine Problems  Cervical spine problems can often be treated without surgery. Choices include rest, medicines, or injections. Your healthcare provider may also suggest physical therapy or certain exercises. These may all help to relieve your symptoms. These treatments are often successful. But if your symptoms dont subside, talk to your healthcare provider. He or she may tell you that surgery is your best choice.  Relieving your symptoms  Your healthcare provider may recommend:  · Medicines. These help to reduce pain and inflammation.  · Epidural steroid injections. These are injections into the spinal canal near the spinal cord. They may relieve severe pain and reduce inflammation.  · Restricting aggravating activities. This can help to give your cervical spine time to heal.  · A soft cervical collar. This can help to support your head while keeping your cervical spine aligned.  · Traction. This may help relieve pressure on the irritated nerves.  Restoring mobility and strength  Your healthcare provider may recommend that you work with a physical therapist. This can help you regain mobility and strength in your neck. Physical therapy may last for 4 to 8 weeks. It may include:  · Exercises to improve your necks range of motion and strength.  · Evaluation and correction of posture and body movements. This can correct problems that affect your cervical spine.  · Heat, massage, and traction to help relieve your symptoms.  Self-care  Youll take an active role in your own therapy. To protect your neck from further injury:  · Follow any exercise program given to you by your healthcare provider or physical therapist.  · Practice good posture while sitting, standing, or moving.  · Have your workspace evaluated. Rearrange it if needed.  · When lying down, support your neck. You can use a special cervical pillow or rolled-up towel.   Date Last Reviewed: 10/5/2015  © 7630-2510 [employer]. [address] [place] [address], [place] [address]. All rights reserved. This information is not intended as a substitute for professional medical care. Always follow your healthcare professional's instructions.

## 2017-09-07 ENCOUNTER — CLINICAL SUPPORT (OUTPATIENT)
Dept: SMOKING CESSATION | Facility: CLINIC | Age: 77
End: 2017-09-07
Payer: COMMERCIAL

## 2017-09-07 DIAGNOSIS — E11.9 TYPE 2 DIABETES MELLITUS WITHOUT COMPLICATION: ICD-10-CM

## 2017-09-07 DIAGNOSIS — F17.200 NICOTINE DEPENDENCE: ICD-10-CM

## 2017-09-07 PROCEDURE — 99402 PREV MED CNSL INDIV APPRX 30: CPT | Mod: S$GLB,,,

## 2017-09-07 PROCEDURE — 99999 PR PBB SHADOW E&M-EST. PATIENT-LVL I: CPT | Mod: PBBFAC,,,

## 2017-09-07 RX ORDER — IBUPROFEN 200 MG
1 TABLET ORAL DAILY
Qty: 14 PATCH | Refills: 0 | Status: SHIPPED | OUTPATIENT
Start: 2017-09-07 | End: 2017-10-08

## 2017-09-07 NOTE — Clinical Note
Comments: pt present for f/u not feeling well, states she has had some arthritis in her neck and sinus issues here lately, she is doing well with her smoking, she is down to 4 cigarettes per day, she continues the 21mg nicotine patch daily, recommend she continues this dose patch until she quits, goal set for a quit date on 9/20/17 and for her to cut  Back to 2 cigarettes over the next two weeks, session handout provided and dicussed, contact card provided, will follow

## 2017-09-07 NOTE — PROGRESS NOTES
Individual Follow-Up Form    9/7/2017    Quit Date: 9/20/17    Clinical Status of Patient: Outpatient    Length of Service: 30 minutes    Continuing Medication: yes  Patches    Other Medications: n/a     Target Symptoms: Withdrawal and medication side effects. The following were  rated moderate (3) to severe (4) on TCRS:  · Moderate (3): 0  · Severe (4): 0    Comments: pt present for f/u not feeling well, states she has had some arthritis in her neck and sinus issues here lately, she is doing well with her smoking, she is down to 4 cigarettes per day, she continues the 21mg nicotine patch daily, recommend she continues this dose patch until she quits, goal set for a quit date on 9/20/17 and for her to cut  Back to 2 cigarettes over the next two weeks, session handout provided and dicussed, contact card provided, will follow     Diagnosis: F17.210    Next Visit: 2 weeks

## 2017-09-09 ENCOUNTER — HOSPITAL ENCOUNTER (EMERGENCY)
Facility: HOSPITAL | Age: 77
Discharge: HOME | End: 2017-09-09
Attending: EMERGENCY MEDICINE
Payer: MEDICARE

## 2017-09-09 VITALS
RESPIRATION RATE: 20 BRPM | HEART RATE: 115 BPM | SYSTOLIC BLOOD PRESSURE: 200 MMHG | TEMPERATURE: 99 F | HEIGHT: 62 IN | DIASTOLIC BLOOD PRESSURE: 84 MMHG | WEIGHT: 117 LBS | OXYGEN SATURATION: 96 % | BODY MASS INDEX: 21.53 KG/M2

## 2017-09-09 DIAGNOSIS — R52 PAIN: ICD-10-CM

## 2017-09-09 DIAGNOSIS — M47.812 OSTEOARTHRITIS OF CERVICAL SPINE, UNSPECIFIED SPINAL OSTEOARTHRITIS COMPLICATION STATUS: Primary | ICD-10-CM

## 2017-09-09 LAB
ALBUMIN SERPL BCP-MCNC: 2.6 G/DL
ALP SERPL-CCNC: 152 U/L
ALT SERPL W/O P-5'-P-CCNC: 10 U/L
ANION GAP SERPL CALC-SCNC: 12 MMOL/L
AST SERPL-CCNC: 17 U/L
BASOPHILS # BLD AUTO: 0.02 K/UL
BASOPHILS NFR BLD: 0.2 %
BILIRUB SERPL-MCNC: 0.9 MG/DL
BUN SERPL-MCNC: 28 MG/DL
CALCIUM SERPL-MCNC: 9.7 MG/DL
CHLORIDE SERPL-SCNC: 103 MMOL/L
CO2 SERPL-SCNC: 27 MMOL/L
CREAT SERPL-MCNC: 0.8 MG/DL
DIFFERENTIAL METHOD: ABNORMAL
EOSINOPHIL # BLD AUTO: 0 K/UL
EOSINOPHIL NFR BLD: 0 %
ERYTHROCYTE [DISTWIDTH] IN BLOOD BY AUTOMATED COUNT: 14.6 %
EST. GFR  (AFRICAN AMERICAN): >60 ML/MIN/1.73 M^2
EST. GFR  (NON AFRICAN AMERICAN): >60 ML/MIN/1.73 M^2
GLUCOSE SERPL-MCNC: 191 MG/DL
HCT VFR BLD AUTO: 32 %
HGB BLD-MCNC: 10.7 G/DL
LYMPHOCYTES # BLD AUTO: 1.4 K/UL
LYMPHOCYTES NFR BLD: 12.3 %
MCH RBC QN AUTO: 26.3 PG
MCHC RBC AUTO-ENTMCNC: 33.4 G/DL
MCV RBC AUTO: 79 FL
MONOCYTES # BLD AUTO: 0.9 K/UL
MONOCYTES NFR BLD: 7.5 %
NEUTROPHILS # BLD AUTO: 9.2 K/UL
NEUTROPHILS NFR BLD: 79.7 %
PLATELET # BLD AUTO: 290 K/UL
PMV BLD AUTO: 9.9 FL
POTASSIUM SERPL-SCNC: 3.5 MMOL/L
PROT SERPL-MCNC: 7.4 G/DL
RBC # BLD AUTO: 4.07 M/UL
SODIUM SERPL-SCNC: 142 MMOL/L
WBC # BLD AUTO: 11.57 K/UL

## 2017-09-09 PROCEDURE — 63600175 PHARM REV CODE 636 W HCPCS: Performed by: EMERGENCY MEDICINE

## 2017-09-09 PROCEDURE — 25000003 PHARM REV CODE 250: Performed by: ORTHOPAEDIC SURGERY

## 2017-09-09 PROCEDURE — 96374 THER/PROPH/DIAG INJ IV PUSH: CPT

## 2017-09-09 PROCEDURE — 85025 COMPLETE CBC W/AUTO DIFF WBC: CPT

## 2017-09-09 PROCEDURE — 93005 ELECTROCARDIOGRAM TRACING: CPT

## 2017-09-09 PROCEDURE — 96361 HYDRATE IV INFUSION ADD-ON: CPT

## 2017-09-09 PROCEDURE — 96376 TX/PRO/DX INJ SAME DRUG ADON: CPT

## 2017-09-09 PROCEDURE — 93010 ELECTROCARDIOGRAM REPORT: CPT | Mod: ,,, | Performed by: INTERNAL MEDICINE

## 2017-09-09 PROCEDURE — 99284 EMERGENCY DEPT VISIT MOD MDM: CPT | Mod: ,,, | Performed by: EMERGENCY MEDICINE

## 2017-09-09 PROCEDURE — 63600175 PHARM REV CODE 636 W HCPCS: Performed by: ORTHOPAEDIC SURGERY

## 2017-09-09 PROCEDURE — 99284 EMERGENCY DEPT VISIT MOD MDM: CPT | Mod: 25

## 2017-09-09 PROCEDURE — 80053 COMPREHEN METABOLIC PANEL: CPT

## 2017-09-09 RX ORDER — MORPHINE SULFATE 2 MG/ML
2 INJECTION, SOLUTION INTRAMUSCULAR; INTRAVENOUS
Status: COMPLETED | OUTPATIENT
Start: 2017-09-09 | End: 2017-09-09

## 2017-09-09 RX ADMIN — MORPHINE SULFATE 2 MG: 2 INJECTION, SOLUTION INTRAMUSCULAR; INTRAVENOUS at 02:09

## 2017-09-09 RX ADMIN — MORPHINE SULFATE 2 MG: 2 INJECTION, SOLUTION INTRAMUSCULAR; INTRAVENOUS at 12:09

## 2017-09-09 RX ADMIN — MORPHINE SULFATE 2 MG: 2 INJECTION, SOLUTION INTRAMUSCULAR; INTRAVENOUS at 04:09

## 2017-09-09 RX ADMIN — SODIUM CHLORIDE 500 ML: 0.9 INJECTION, SOLUTION INTRAVENOUS at 12:09

## 2017-09-09 NOTE — ED NOTES
LOC: The patient is awake, alert and aware of environment with an appropriate affect, the patient is oriented x 3 and speaking appropriately.  APPEARANCE: Patient resting comfortably and in no acute distress, patient is clean and well groomed  SKIN: The skin is warm and dry, color consistent with ethnicity, patient has normal skin turgor and moist mucus membranes, skin intact, no breakdown or brusing noted.  MUSCULOSKELETAL: Patient is unable to move all extremities, pt states severe pain when she moves her arms above her waist, Pt is unable to  her neck in any direction, no obvious swelling or deformities noted.   RESPIRATORY: Airway is open and patent, breath sounds clear throughout all lung fields; respirations are spontaneous, patient has a normal effort and rate, no accessory muscle use noted.   CARDIAC: Patient has no peripheral edema noted, capillary refill < 3 seconds. No complaints of chest pain   ABDOMEN: Soft and non tender to palpation, no distention noted. Bowel sounds present x 4  NEUROLOGIC: PERRL, 4mm bilaterally, eyes open spontaneously, behavior appropriate to situation, follows commands, facial expression symmetrical, bilateral hand grasp equal and even, purposeful motor response noted, normal sensation in all extremities when touched with a finger.

## 2017-09-09 NOTE — ED TRIAGE NOTES
Family states pt has been hurting for the last 6 months. Family states the pain started 6 months ago in pts legs, family states the pain has continued to get worse, and move up along pts spine. Family states for the last couple of days pts has been having severe pain in her neck

## 2017-09-09 NOTE — ED PROVIDER NOTES
Encounter Date: 9/9/2017       History     Chief Complaint   Patient presents with    Neck Pain     fell on tailbone over 6 mos ago, neck pain too, seen by md and given meds and not working, denes bowel /bladder incontinence    Tailbone Pain     Khloe Kendrick is a 77 y.o. female with a past medical history of Coronary artery disease; Diabetes mellitus, type 2; Hyperlipidemia; Hypertension; Hyperthyroidism; Osteoarthritis; and Peripheral arterial disease who presents to the ED for evaluation neck pain, coccyx pain and generalized weakness. She is here with her daughters which they state she is hurting all over and not eating. They have been to an outside ED and given narcotics but never given a answer. She denies any new trauma, fever or chills.            Review of patient's allergies indicates:   Allergen Reactions    Pravastatin Other (See Comments)     Muscle pain     Past Medical History:   Diagnosis Date    AMD (age-related macular degeneration), bilateral 6/10/2016    Angina pectoris     Cataract     Coronary artery disease     Diabetes mellitus, type 2     Hyperlipidemia     Hypertension     Hyperthyroidism 8/20/2015    Hypothyroidism     Osteoarthritis     Peripheral arterial disease 4/3/2013    Tobacco dependence     Type 2 diabetes mellitus     Type 2 diabetes with peripheral circulatory disorder, controlled      Past Surgical History:   Procedure Laterality Date    ABDOMINAL SURGERY      APPENDECTOMY      CARDIAC SURGERY  2008    CABG    CATARACT EXTRACTION Right     CATARACT EXTRACTION W/  INTRAOCULAR LENS IMPLANT  8/13/2015    Dr. Blount ( [facility])    EYE SURGERY  2011    Rt eye cataract    HERNIA REPAIR      HYSTERECTOMY      HYSTEROTOMY N/A 33 yrs old     Family History   Problem Relation Age of Onset    Diabetes Mother     Coronary artery disease Father     Cataracts Father     Heart disease Brother     Cataracts Sister     No Known Problems Maternal Aunt     No  Known Problems Maternal Uncle     No Known Problems Paternal Aunt     No Known Problems Paternal Uncle     No Known Problems Maternal Grandmother     No Known Problems Maternal Grandfather     No Known Problems Paternal Grandmother     No Known Problems Paternal Grandfather     Amblyopia Neg Hx     Blindness Neg Hx     Cancer Neg Hx     Glaucoma Neg Hx     Hypertension Neg Hx     Macular degeneration Neg Hx     Retinal detachment Neg Hx     Strabismus Neg Hx     Stroke Neg Hx     Thyroid disease Neg Hx      Social History   Substance Use Topics    Smoking status: Current Every Day Smoker     Packs/day: 1.00    Smokeless tobacco: Current User    Alcohol use No     Review of Systems   Constitutional: Positive for activity change and appetite change. Negative for chills, fever and unexpected weight change.   HENT: Negative for postnasal drip and rhinorrhea.    Eyes: Negative for photophobia and visual disturbance.   Respiratory: Negative for shortness of breath and wheezing.    Cardiovascular: Negative for chest pain and palpitations.   Gastrointestinal: Negative for abdominal distention, abdominal pain and constipation.   Genitourinary: Negative for dysuria, flank pain, frequency and urgency.   Musculoskeletal: Positive for arthralgias, back pain, gait problem, joint swelling, myalgias, neck pain and neck stiffness.   Skin: Negative for color change and pallor.   Neurological: Negative for dizziness, speech difficulty and headaches.   Psychiatric/Behavioral: Negative for agitation, behavioral problems, confusion, decreased concentration and dysphoric mood.       Physical Exam     Initial Vitals [09/09/17 1039]   BP Pulse Resp Temp SpO2   136/63 (!) 120 20 99.1 °F (37.3 °C) 96 %      MAP       87.33         Physical Exam    Nursing note and vitals reviewed.  Constitutional: She appears well-developed and well-nourished. She is not diaphoretic. No distress.   HENT:   Head: Normocephalic and  atraumatic.   Eyes: Conjunctivae and EOM are normal. Pupils are equal, round, and reactive to light. Right eye exhibits no discharge. Left eye exhibits no discharge.   Cardiovascular: Regular rhythm and normal heart sounds. Tachycardia present.    Pulmonary/Chest: Breath sounds normal. No respiratory distress. She has no wheezes.   Abdominal: Soft. Bowel sounds are normal. She exhibits no distension. There is no tenderness.   Musculoskeletal:        Cervical back: She exhibits tenderness.        Lumbar back: She exhibits tenderness.   Generalized tenderness all over. Most of her pain is in her cervical spine which is not centrally localized. She is NVI   Neurological: She is alert and oriented to person, place, and time.   Skin: Skin is warm and dry.   No skin breakdown.          ED Course   Procedures  Labs Reviewed   CBC W/ AUTO DIFFERENTIAL   COMPREHENSIVE METABOLIC PANEL     EKG Readings: (Independently Interpreted)   Initial Reading: No STEMI. Rhythm: Sinus Tachycardia. Ectopy: No Ectopy.       X-Rays:   Independently Interpreted Readings:   Other Readings:  Xray of the cervical spine showing degenerative changes with no fracture or dislocations.   Xray of lumbar and sacrum showing degenerative changes with no fracture or dislocations.   Ct of the cervical spine showing no fracture or dislocations. Degenerative changes noted.       Medical Decision Making:   Initial Assessment:   Deconditioning   Differential Diagnosis:   Diff dx includes deconditioning. Cervical OA, lumbar OA.  Independently Interpreted Test(s):   I have ordered and independently interpreted X-rays - see prior notes.  I have ordered and independently interpreted EKG Reading(s) - see prior notes  Clinical Tests:   Lab Tests: Ordered and Reviewed  Radiological Study: Ordered and Reviewed  ED Management:  1145: Pt seen and discussed with staff. Diff dx includes deconditioning. Cervical OA, lumbar OA. Sepsis. Labs, and imaging ordered.  1223: labs  and imaging reviewed. No frx or dislocations noted. EKG read showing RBB and tachycardia.  1313: CT cervical spine ordered   1508: Ct showing no fracture or dislocations noted. Degenerative changes noted.   1624: Discussed with staff. Pain secondary to severe degenerative changes. She has not taken any of her BP meds and but is asymptomatic. Pt will take medications now. She has a referral in for spine and back center which she will get plugged in with PMR and possible pain management.   Other:   I discussed test(s) with the performing physician.       APC / Resident Notes:   1145: Pt seen and discussed with staff. Diff dx includes deconditioning. Cervical OA, lumbar OA. Sepsis. Labs, and imaging ordered.  1223: labs and imaging reviewed. No frx or dislocations noted. EKG read showing RBB and tachycardia.  1313: CT cervical spine ordered   1508: Ct showing no fracture or dislocations noted. Degenerative changes noted.   1624: Discussed with staff. Pain secondary to severe degenerative changes. She has not taken any of her BP meds and but is asymptomatic. Pt will take medications now. She has a referral in for spine and back center which she will get plugged in with PMR and possible pain management.          Attending Attestation:   Physician Attestation Statement for Resident:  As the supervising MD  -: Attending Note:  Physician Attestation Statement: I have personally seen and examined this patient. As the supervising MD I agree with the above history. As the supervising MD I agree with the above PE. As the supervising MD I agree with the above treatment, course, plan, and disposition. Pt pain likely more chronic in nature, as she has no acute findings on her imaging today. She did improve with pain medication, and states that she has both norco and tramadol at home, so she does not require more meds. I stressed to her and her family that she likely has a chronic degenerative process and would need follow up with  her primary doctor in order to minimize her discomfort, but that this could continue to get worse.                       ED Course      Clinical Impression:   The primary encounter diagnosis was Osteoarthritis of cervical spine, unspecified spinal osteoarthritis complication status. A diagnosis of Pain was also pertinent to this visit.    Disposition:   Disposition: Discharged  Condition: Stable   Pain secondary to severe degenerative changes. She has not taken any of her BP meds and but is asymptomatic. Pt will take medications now. She has a referral in for spine and back center which she will get plugged in with PMR and possible pain management.                         Yoel Aburto MD  09/10/17 0950

## 2017-09-11 ENCOUNTER — OUTPATIENT CASE MANAGEMENT (OUTPATIENT)
Dept: ADMINISTRATIVE | Facility: OTHER | Age: 77
End: 2017-09-11

## 2017-09-11 NOTE — PROGRESS NOTES
Daughter reports that the patient is not doing well. Reports that the patient can not do anything for herself. Reports that the patient is incontinent of urine because she can not get to the bathroom in time. Reports that the patient is in a lot of pain from her neck and seems like no one want to do a MRI. Reports that he mother hurts so much it seems as if she hurts when someone touches her skin. Reports that her mother was given morphine while in the ED.Reports that she thinks that it is more than arthritis. Reports that she is bringing her mother to a pain specialist on the [facility] tomorrow. Reports that her mother needs someone to look in on her during the day. Reports that her mother lives with one of her sisters on the [facility]. Reports that all of the patient's children work out of the home. Reports that she is interested in finding a rheumatologist on the [facility].No [facility] rheumatologist found per Lux. Daughter informed of two rheumatologist on the [facility] with phone number to schedule an appt.  Plan: Mail Senior [facility] and [facility]

## 2017-09-11 NOTE — PATIENT INSTRUCTIONS
General Neck and Back Pain    Both neck and back pain are usually caused by injury to the muscles or ligaments of the spine. Sometimes the disks that separate each bone of the spine may cause pain by pressing on a nearby nerve. Back and neck pain may appear after a sudden twisting or bending force (such as in a car accident), or sometimes after a simple awkward movement. In either case, muscle spasm is often present and adds to the pain.  Acute neck and back pain usually gets better in 1 to 2 weeks. Pain related to disk disease, arthritis in the spinal joints or spinal stenosis (narrowing of the spinal canal) can become chronic and last for months or years.  Back and neck pain are common problems. Most people feel better in 1 or 2 weeks, and most of the rest in 1 to 2 months. Most people can remain active.  People experience and describe pain differently.  · Pain can be sharp, stabbing, shooting, aching, cramping, or burning  · Movement, standing, bending, lifting, sitting, or walking may worsen the pain  · Pain can be localized to one spot or area, or it can be more generalized  · Pain can spread or radiate upwards, downwards, to the front, or go down your arms  · Muscle spasm may occur.  Most of the time mechanical problems with the muscles or spine cause the pain. it is usually caused by an injury, whether known or not, to the muscles or ligaments. While illnesses can cause back pain, it is usually not caused by a serious illness. Pain is usually related to physical activity, whether sports, exercise, work, or normal activity. Sometimes it can occur without an identifiable cause. This can happen simply by stretching or moving wrong, without noting pain at the time. Other causes include:  · Overexertion, lifting, pushing, pulling incorrectly or too aggressively.  · Sudden twisting, bending or stretching from an accident (car or fall), or accidental movement.  · Poor posture  · Poor conditioning, lack of regular  exercise  · Spinal disc disease or arthritis  · Stress  · Pregnancy, or illness like appendicitis, bladder or kidney infection, pelvic infections   Home care  · For neck pain: Use a comfortable pillow that supports the head and keeps the spine in a neutral position. The position of the head should not be tilted forward or backward.  · When in bed, try to find a position of comfort. A firm mattress is best. Try lying flat on your back with pillows under your knees. You can also try lying on your side with your knees bent up towards your chest and a pillow between your knees.  · At first, do not try to stretch out the sore spots. If there is a strain, it is not like the good soreness you get after exercising without an injury. In this case, stretching may make it worse.  · Avoid prolonged sitting, long car rides or travel. This puts more stress on the lower back than standing or walking.  · During the first 24 to 72 hours after an injury, apply an ice pack to the painful area for 20 minutes and then remove it for 20 minutes over a period of 60 to 90 minutes or several times a day.   · You can alternate ice and heat therapies. Talk with your healthcare provider about the best treatment for your back or neck pain. As a safety precaution, do not use a heating pad at bedtime. Sleeping with a heating pad can lead to skin burns or tissue damage.  · Therapeutic massage can help relax the back and neck muscles without stretching them.  · Be aware of safe lifting methods and do not lift anything over 15 pounds until all the pain is gone.  Medications  Talk to your healthcare provider before using medicine, especially if you have other medical problems or are taking other medicines.  · You may use over-the-counter medicine to control pain, unless another pain medicine was prescribed. If you have chronic conditions like diabetes, liver or kidney disease, stomach ulcers,  gastrointestinal bleeding, or are taking blood thinner  medicines.  · Be careful if you are given pain medicines, narcotics, or medicine for muscle spasm. They can cause drowsiness, and can affect your coordination, reflexes, and judgment. Do not drive or operate heavy machinery.  Follow-up care  Follow up with your healthcare provider, or as advised. Physical therapy or further tests may be needed.  If X-rays were taken, you will be notified of any new findings that may affect your care.  Call 911  Seek emergency medical care if any of the following occur:  · Trouble breathing  · Confusion  · Very drowsy or trouble awakening  · Fainting or loss of consciousness  · Rapid or very slow heart rate  · Loss of bowel or bladder control  When to seek medical advice  Call your healthcare provider right away if any of these occur:  · Pain becomes worse or spreads into your arms or legs  · Weakness, numbness or pain in one or both arms or legs  · Numbness in the groin area  · Difficulty walking  · Fever of 100.4ºF (38ºC) or higher, or as directed by your healthcare provider  Date Last Reviewed: 7/1/2016 © 2000-2017 [employer]. [address] [place] [address], [place] [address]. All rights reserved. This information is not intended as a substitute for professional medical care. Always follow your healthcare professional's instructions.

## 2017-09-12 ENCOUNTER — PATIENT MESSAGE (OUTPATIENT)
Dept: FAMILY MEDICINE | Facility: CLINIC | Age: 77
End: 2017-09-12

## 2017-09-12 ENCOUNTER — TELEPHONE (OUTPATIENT)
Dept: FAMILY MEDICINE | Facility: CLINIC | Age: 77
End: 2017-09-12

## 2017-09-12 NOTE — PROGRESS NOTES
Routine Office Visit    Patient Name: Khloe Kendrick    : 1940  MRN: [id number]    Subjective:  Khloe is a 77 y.o. female who presents today for     1. Severe neck pain - chronic condition for patient - pt has had neck pain > 1 year. Pain is described as severe, constant, worse with movement and alleviated with nothing. Pt reports no radiating pain. No associated numbness / tingling sensation in her hands. No problems with movement. No incontinence. No recent injuries to have caused pain. X-ray done 2 years ago.   2. Tail bone swelling - pt fell in bathtub over 1 month ago. Pt denies any pain in tailbone, but there is swelling over tail bone. No incontinence.   3. Carotid stenosis noted on x-ray - pt does report intermittent episodes of dizziness. Dizziness is often associated with movement or change in position. Dizziness is worse when first getting out of bed. Not present at this moment.     Review of Systems   Constitutional: Negative for chills and fever.   HENT: Negative for congestion.    Eyes: Negative for blurred vision.   Respiratory: Negative for cough.    Cardiovascular: Negative for chest pain.   Gastrointestinal: Negative for abdominal pain, constipation, diarrhea, heartburn, nausea and vomiting.   Genitourinary: Negative for dysuria.   Musculoskeletal: Positive for neck pain. Negative for myalgias.   Skin: Negative for itching and rash.   Neurological: Positive for dizziness. Negative for headaches.   Psychiatric/Behavioral: Negative for depression.       Active Problem List  Patient Active Problem List   Diagnosis    Diabetes mellitus type 2, controlled    Essential hypertension    Combined hyperlipidemia associated with type 2 diabetes mellitus    Osteopenia    Osteoarthritis    Tobacco use    Asymptomatic carotid artery stenosis without infarction    Peripheral arterial disease    Atherosclerosis of aorta    Angina pectoris    Coronary artery disease    Pseudophakia    Senile  nuclear sclerosis    Hyperthyroidism    Weight loss    Allergic rhinitis    Nonexudative age-related macular degeneration, bilateral, advanced atrophic with subfoveal involvement    Bilateral posterior capsular opacification    Vitreomacular traction syndrome of left eye    Pyogenic arthritis of right knee joint    Sepsis       Past Surgical History  Past Surgical History:   Procedure Laterality Date    ABDOMINAL SURGERY      APPENDECTOMY      CARDIAC SURGERY  2008    CABG    CATARACT EXTRACTION Right     CATARACT EXTRACTION W/  INTRAOCULAR LENS IMPLANT  8/13/2015    Dr. Blount ( [facility])    EYE SURGERY  2011    Rt eye cataract    HERNIA REPAIR      HYSTERECTOMY      HYSTEROTOMY N/A 33 yrs old       Family History  Family History   Problem Relation Age of Onset    Diabetes Mother     Coronary artery disease Father     Cataracts Father     Heart disease Brother     Cataracts Sister     No Known Problems Maternal Aunt     No Known Problems Maternal Uncle     No Known Problems Paternal Aunt     No Known Problems Paternal Uncle     No Known Problems Maternal Grandmother     No Known Problems Maternal Grandfather     No Known Problems Paternal Grandmother     No Known Problems Paternal Grandfather     Amblyopia Neg Hx     Blindness Neg Hx     Cancer Neg Hx     Glaucoma Neg Hx     Hypertension Neg Hx     Macular degeneration Neg Hx     Retinal detachment Neg Hx     Strabismus Neg Hx     Stroke Neg Hx     Thyroid disease Neg Hx        Social History  Social History     Social History    Marital status:      Spouse name: N/A    Number of children: N/A    Years of education: N/A     Occupational History    Not on file.     Social History Main Topics    Smoking status: Current Every Day Smoker     Packs/day: 1.00    Smokeless tobacco: Current User    Alcohol use No    Drug use: No    Sexual activity: Not on file     Other Topics Concern    Not on file     Social History  Narrative    No narrative on file       Medications and Allergies  Reviewed and updated.   Current Outpatient Prescriptions   Medication Sig    ACCU-CHEK SMARTVIEW TEST STRIP Strp     ALCOHOL PREP PADS PadM     alendronate (FOSAMAX) 70 MG tablet Take 1 tablet (70 mg total) by mouth every 7 days.    aspirin 81 MG Chew Take 81 mg by mouth once daily.    cetirizine (ZYRTEC) 10 MG tablet TAKE 1 TABLET (10 MG TOTAL) BY MOUTH DAILY AS NEEDED FOR ALLERGIES OR RHINITIS.    cilostazol (PLETAL) 50 MG Tab Take 1 tablet (50 mg total) by mouth once daily.    fluticasone (FLONASE) 50 mcg/actuation nasal spray USE 1 SPRAY BY EACH NARE ROUTE ONCE DAILY.    gabapentin (NEURONTIN) 300 MG capsule Take 1 capsule (300 mg total) by mouth every evening.    ibuprofen (ADVIL,MOTRIN) 800 MG tablet Take 1 tablet (800 mg total) by mouth 3 (three) times daily.    lancets 30 gauge Misc     lancing device Misc     metoprolol tartrate (LOPRESSOR) 50 MG tablet Take 1 tablet (50 mg total) by mouth once daily.    ramipril (ALTACE) 5 MG capsule TAKE 1 CAPSULE (5 MG TOTAL) BY MOUTH ONCE DAILY.    tizanidine (ZANAFLEX) 4 MG tablet Take 1 tablet (4 mg total) by mouth every 8 (eight) hours.    tramadol (ULTRAM) 50 mg tablet Take 1 tablet (50 mg total) by mouth every 6 (six) hours as needed for Pain.    allopurinol (ZYLOPRIM) 100 MG tablet Take 1 tablet (100 mg total) by mouth once daily.    colchicine 0.6 mg tablet Take 2 tablets (1.2 mg total) by mouth once daily. At onset of gout symptoms. May repeat tablet every 12 hours    glipiZIDE (GLUCOTROL) 10 MG tablet Take 1 tablet (10 mg total) by mouth 2 (two) times daily before meals.    lidocaine-prilocaine (EMLA) cream APPLY TOPICALLY AS NEEDED. TO LEFT SHOULDER    meclizine (ANTIVERT) 25 mg tablet Take 1 tablet (25 mg total) by mouth 3 (three) times daily as needed for Dizziness.    nicotine (NICODERM CQ) 21 mg/24 hr Place 1 patch onto the skin once daily. CLEAR VERSION ONLY - NO  "OPAQUE    nitroGLYCERIN (NITROSTAT) 0.4 MG SL tablet Place 1 tablet (0.4 mg total) under the tongue every 5 (five) minutes as needed for Chest pain. 1 Tablet, Sublingual Sublingual as needed .  as directed    NORMAL SALINE FLUSH 0.9 % injection     propylthiouracil (PTU) 50 mg Tab Please take 100 mg in am , 50 mg noon and 100 mg pm.     No current facility-administered medications for this visit.        Physical Exam  BP (!) 160/60 (BP Location: Right arm, Patient Position: Sitting, BP Method: Medium (Manual))   Pulse 90   Temp 98.3 °F (36.8 °C) (Oral)   Ht 5' 2" (1.575 m)   Wt 51.9 kg (114 lb 6.7 oz)   SpO2 97%   BMI 20.93 kg/m²   Physical Exam   Constitutional: She is oriented to person, place, and time. She appears well-developed and well-nourished.   HENT:   Head: Normocephalic and atraumatic.   Eyes: Conjunctivae and EOM are normal. Pupils are equal, round, and reactive to light.   Neck: Neck supple. Muscular tenderness present. No spinous process tenderness present. No neck rigidity. Decreased range of motion present.   spurling negative bilaterally   Cardiovascular: Normal rate, regular rhythm and normal heart sounds.  Exam reveals no gallop and no friction rub.    No murmur heard.  Pulmonary/Chest: Breath sounds normal. No respiratory distress.   Abdominal: Soft. Bowel sounds are normal. She exhibits no distension. There is no tenderness.   Lymphadenopathy:     She has no cervical adenopathy.   Neurological: She is alert and oriented to person, place, and time.   Skin: Skin is warm.   Psychiatric: She has a normal mood and affect.         Assessment/Plan:  Khloe Kendrick is a 77 y.o. female who presents today for :    Neck muscle spasm  -     X-Ray Cervical Spine AP And Lateral; Future; Expected date: 09/06/2017  -     tizanidine (ZANAFLEX) 4 MG tablet; Take 1 tablet (4 mg total) by mouth every 8 (eight) hours.  Dispense: 90 tablet; Refill: 1  -     [facility]  Suspect severe muscle spasm or strain in neck;   No compression noted   Will f/u with x-ray   Recommend stretching exercises, heat, and massage.     Neck pain  -     X-Ray Cervical Spine AP And Lateral; Future; Expected date: 09/06/2017  -     [facility]    Osteoarthritis of cervical spine, unspecified spinal osteoarthritis complication status  -     tramadol (ULTRAM) 50 mg tablet; Take 1 tablet (50 mg total) by mouth every 6 (six) hours as needed for Pain.  Dispense: 60 tablet; Refill: 0  -     Ambulatory Consult [facility]    Carotid artery calcification, bilateral  -     Radiology US Carotid Bilateral; Future; Expected date: 09/06/2017    BPPV (benign paroxysmal positional vertigo), unspecified laterality  -     meclizine (ANTIVERT) 25 mg tablet; Take 1 tablet (25 mg total) by mouth 3 (three) times daily as needed for Dizziness.  Dispense: 90 tablet; Refill: 1  Common side effects of this medication were discussed with the patient. Questions regarding medications were discussed during this visit.     Gout, unspecified cause, unspecified chronicity, unspecified site  -     allopurinol (ZYLOPRIM) 100 MG tablet; Take 1 tablet (100 mg total) by mouth once daily.  Dispense: 90 tablet; Refill: 1  Pt with hx of gout; suspect she may have frequent gout   Would like to start on medications for gout  Uric acid level elevated    Peripheral arterial disease / Atherosclerosis of aorta / Essential hypertension / Combined hyperlipidemia associated with type 2 diabetes mellitus  Patient with Atherosclerosis of the Aorta.  Stable/asymptomatic. Currently stable on lipid lowering treatment and b/p monitoring.    Hyperthyroidism  The current medical regimen is effective;  continue present plan and medications.    Controlled type 2 diabetes mellitus with diabetic nephropathy, without long-term current use of insulin  Noted in chart  Continue current regimen         Return if symptoms  worsen or fail to improve.

## 2017-09-12 NOTE — TELEPHONE ENCOUNTER
Spoke with pt daughter and pt has been seen by Dr.Mark Connolly on the [facility].

## 2017-09-12 NOTE — TELEPHONE ENCOUNTER
----- Message from Rupert Mead sent at 9/11/2017  8:50 AM CDT -----  Contact: / Gayle/ [phone]  Calling regarding get mother an pain management doctor. Pt still in so much pain,reccomendations is needed. Pt also was admitted to [facility] main campus over weekend.

## 2017-09-12 NOTE — TELEPHONE ENCOUNTER
Patient was given information from out patient case management about physician's on the [facility].

## 2017-09-19 ENCOUNTER — OUTPATIENT CASE MANAGEMENT (OUTPATIENT)
Dept: ADMINISTRATIVE | Facility: OTHER | Age: 77
End: 2017-09-19

## 2017-09-20 ENCOUNTER — TELEPHONE (OUTPATIENT)
Dept: FAMILY MEDICINE | Facility: CLINIC | Age: 77
End: 2017-09-20

## 2017-09-20 NOTE — TELEPHONE ENCOUNTER
----- Message from Roberta Talavera sent at 9/20/2017  1:55 PM CDT -----  Contact: Mahi Connolly office   Mahi with Dr. Cirilo Connolly office would like to speak with the nurse. Please call at [phone]

## 2017-09-20 NOTE — TELEPHONE ENCOUNTER
----- Message from Shi Mendoza sent at 9/20/2017 12:54 PM CDT -----  Contact: Dr Cirilo Champagne from [facility] is requesting a call back. Please call [phone].    Thanks!

## 2017-09-25 ENCOUNTER — OUTPATIENT CASE MANAGEMENT (OUTPATIENT)
Dept: ADMINISTRATIVE | Facility: OTHER | Age: 77
End: 2017-09-25

## 2017-09-25 NOTE — PROGRESS NOTES
Pt reports that she is doing fine today. Pt reports that her blood sugar this morning was 140 after eating breakfast. Pt reports that she is able to do some things for herself. Pt reports that she was unable to move her body do to the pain.Pt reports that she still has the pain but it is better. Pt reports that she has an appt for tomorrow on the [facility]. Reviewed noninvasive pain relief. Plan: Review s/s of HTN.

## 2017-09-28 ENCOUNTER — TELEPHONE (OUTPATIENT)
Dept: SMOKING CESSATION | Facility: CLINIC | Age: 77
End: 2017-09-28

## 2017-10-04 ENCOUNTER — CLINICAL SUPPORT (OUTPATIENT)
Dept: SMOKING CESSATION | Facility: CLINIC | Age: 77
End: 2017-10-04
Payer: COMMERCIAL

## 2017-10-04 DIAGNOSIS — F17.200 NICOTINE DEPENDENCE: Primary | ICD-10-CM

## 2017-10-04 PROCEDURE — 99407 BEHAV CHNG SMOKING > 10 MIN: CPT | Mod: S$GLB,,,

## 2017-10-09 ENCOUNTER — OUTPATIENT CASE MANAGEMENT (OUTPATIENT)
Dept: ADMINISTRATIVE | Facility: OTHER | Age: 77
End: 2017-10-09

## 2017-10-09 NOTE — PROGRESS NOTES
Pt's daughter reports that the patient is doing fine. Reports that the patient will receive more injections tomorrow for the back pain. Reports that the patient's blood sugar fluctuates. Reports that  The patient plans to start water therapy when she finishes with outpatient therapy. Reviewed noninvasive pain relief.Plan to review symptoms of HTN.

## 2017-10-10 DIAGNOSIS — I25.119 CORONARY ARTERY DISEASE INVOLVING NATIVE CORONARY ARTERY OF NATIVE HEART WITH ANGINA PECTORIS: Chronic | ICD-10-CM

## 2017-10-10 DIAGNOSIS — I70.0 ATHEROSCLEROSIS OF AORTA: Chronic | ICD-10-CM

## 2017-10-10 DIAGNOSIS — I73.9 PERIPHERAL ARTERIAL DISEASE: Chronic | ICD-10-CM

## 2017-10-10 DIAGNOSIS — I10 ESSENTIAL HYPERTENSION: Chronic | ICD-10-CM

## 2017-10-11 RX ORDER — CILOSTAZOL 50 MG/1
50 TABLET ORAL DAILY
Qty: 90 TABLET | Refills: 1 | Status: SHIPPED | OUTPATIENT
Start: 2017-10-11 | End: 2018-04-25 | Stop reason: SDUPTHER

## 2017-10-11 RX ORDER — METOPROLOL TARTRATE 50 MG/1
50 TABLET ORAL DAILY
Qty: 90 TABLET | Refills: 1 | Status: SHIPPED | OUTPATIENT
Start: 2017-10-11 | End: 2018-06-28 | Stop reason: CLARIF

## 2017-10-11 RX ORDER — RAMIPRIL 5 MG/1
5 CAPSULE ORAL DAILY
Qty: 90 CAPSULE | Refills: 1 | Status: SHIPPED | OUTPATIENT
Start: 2017-10-11 | End: 2018-04-09

## 2017-10-13 ENCOUNTER — LAB VISIT (OUTPATIENT)
Dept: LAB | Facility: HOSPITAL | Age: 77
End: 2017-10-13
Attending: INTERNAL MEDICINE
Payer: MEDICARE

## 2017-10-13 DIAGNOSIS — E11.9 CONTROLLED TYPE 2 DIABETES MELLITUS WITHOUT COMPLICATION, WITHOUT LONG-TERM CURRENT USE OF INSULIN: ICD-10-CM

## 2017-10-13 DIAGNOSIS — E05.00 GRAVES DISEASE: ICD-10-CM

## 2017-10-13 LAB
T3 SERPL-MCNC: 96 NG/DL
T4 FREE SERPL-MCNC: 0.92 NG/DL
TSH SERPL DL<=0.005 MIU/L-ACNC: <0.01 UIU/ML

## 2017-10-13 PROCEDURE — 36415 COLL VENOUS BLD VENIPUNCTURE: CPT | Mod: PO

## 2017-10-13 PROCEDURE — 83036 HEMOGLOBIN GLYCOSYLATED A1C: CPT

## 2017-10-13 PROCEDURE — 84480 ASSAY TRIIODOTHYRONINE (T3): CPT

## 2017-10-13 PROCEDURE — 84443 ASSAY THYROID STIM HORMONE: CPT

## 2017-10-13 PROCEDURE — 84439 ASSAY OF FREE THYROXINE: CPT

## 2017-10-14 LAB
ESTIMATED AVG GLUCOSE: 117 MG/DL
HBA1C MFR BLD HPLC: 5.7 %

## 2017-11-02 ENCOUNTER — OUTPATIENT CASE MANAGEMENT (OUTPATIENT)
Dept: ADMINISTRATIVE | Facility: OTHER | Age: 77
End: 2017-11-02

## 2017-11-03 DIAGNOSIS — I65.23 CAROTID ARTERY CALCIFICATION, BILATERAL: Primary | ICD-10-CM

## 2017-11-03 DIAGNOSIS — E11.9 TYPE 2 DIABETES MELLITUS WITHOUT COMPLICATION: ICD-10-CM

## 2017-11-08 ENCOUNTER — HOSPITAL ENCOUNTER (OUTPATIENT)
Dept: RADIOLOGY | Facility: HOSPITAL | Age: 77
Discharge: HOME | End: 2017-11-08
Attending: FAMILY MEDICINE
Payer: MEDICARE

## 2017-11-08 DIAGNOSIS — I65.23 CAROTID ARTERY CALCIFICATION, BILATERAL: ICD-10-CM

## 2017-11-08 PROCEDURE — 93880 EXTRACRANIAL BILAT STUDY: CPT | Mod: 26,,, | Performed by: RADIOLOGY

## 2017-11-08 PROCEDURE — 93880 EXTRACRANIAL BILAT STUDY: CPT | Mod: TC

## 2017-11-30 ENCOUNTER — OUTPATIENT CASE MANAGEMENT (OUTPATIENT)
Dept: ADMINISTRATIVE | Facility: OTHER | Age: 77
End: 2017-11-30

## 2017-11-30 NOTE — PROGRESS NOTES
Pt reports that she is doing ok. Pt reports that she continues to have pain from the neck. Pt reports that she applies a heating pad to the area and receives relief at time. Pt reports that she is living with her daughter in [place]. Pt reports that she does not have transportation to see Dr. Marquis due to living in [place]. Pt reports that her daughter will make an appt with a new PCP on the [facility].Pt reports that she does not have a residence on the [facility] at this time. Pt reports that she has not checked her blood sugar this morning. Pt reports that her blood sugars have been within normal range. Pt reports that she has an appt with pain management doctor on Monday. Pt reports no new concerns. Plan: close case

## 2018-01-09 ENCOUNTER — PATIENT MESSAGE (OUTPATIENT)
Dept: FAMILY MEDICINE | Facility: CLINIC | Age: 78
End: 2018-01-09

## 2018-01-10 RX ORDER — OSELTAMIVIR PHOSPHATE 75 MG/1
75 CAPSULE ORAL DAILY
Qty: 10 CAPSULE | Refills: 0 | Status: SHIPPED | OUTPATIENT
Start: 2018-01-10 | End: 2018-01-20

## 2018-01-12 ENCOUNTER — PATIENT MESSAGE (OUTPATIENT)
Dept: FAMILY MEDICINE | Facility: CLINIC | Age: 78
End: 2018-01-12

## 2018-01-15 DIAGNOSIS — E11.9 CONTROLLED TYPE 2 DIABETES MELLITUS WITHOUT COMPLICATION, WITHOUT LONG-TERM CURRENT USE OF INSULIN: Primary | ICD-10-CM

## 2018-01-16 NOTE — TELEPHONE ENCOUNTER
----- Message from Mariama Arias sent at 1/16/2018 10:31 AM CST -----  Contact: [facility]  Needs Rx for blood sugar diagnostic Strp e-scribed with diagnosis code & specific directions due to pt's insurance. [facility] can be reached @  [phone].

## 2018-01-25 ENCOUNTER — TELEPHONE (OUTPATIENT)
Dept: FAMILY MEDICINE | Facility: CLINIC | Age: 78
End: 2018-01-25

## 2018-01-25 DIAGNOSIS — E11.9 CONTROLLED TYPE 2 DIABETES MELLITUS WITHOUT COMPLICATION, WITHOUT LONG-TERM CURRENT USE OF INSULIN: Primary | ICD-10-CM

## 2018-01-25 DIAGNOSIS — E11.9 CONTROLLED TYPE 2 DIABETES MELLITUS WITHOUT COMPLICATION, WITHOUT LONG-TERM CURRENT USE OF INSULIN: ICD-10-CM

## 2018-01-25 NOTE — TELEPHONE ENCOUNTER
----- Message from Dianne Faust sent at 1/24/2018  1:49 PM CST -----  Contact: Gayle Haywood (Daughter)  Gayle calling to speak to a nurse regarding scripts for diabetic supplies. Please call [phone]

## 2018-01-25 NOTE — TELEPHONE ENCOUNTER
----- Message from Nancy chayagraciela sent at 1/25/2018 11:44 AM CST -----  Contact: [facility] in [place]  Asking for--- blood sugar diagnostic Strp--- To be transferred to [facility] in [place]. Pharmacy # is [phone].  Asking for script to be e-scribed,  include specifics instructions, with Diagnosis code included.

## 2018-02-05 ENCOUNTER — LAB VISIT (OUTPATIENT)
Dept: LAB | Facility: HOSPITAL | Age: 78
End: 2018-02-05
Attending: FAMILY MEDICINE
Payer: MEDICARE

## 2018-02-05 DIAGNOSIS — E05.00 GRAVES DISEASE: ICD-10-CM

## 2018-02-05 PROCEDURE — 84480 ASSAY TRIIODOTHYRONINE (T3): CPT

## 2018-02-05 PROCEDURE — 84439 ASSAY OF FREE THYROXINE: CPT

## 2018-02-05 PROCEDURE — 84443 ASSAY THYROID STIM HORMONE: CPT

## 2018-02-05 PROCEDURE — 36415 COLL VENOUS BLD VENIPUNCTURE: CPT | Mod: PO

## 2018-02-06 LAB
T3 SERPL-MCNC: 99 NG/DL
T4 FREE SERPL-MCNC: 1.04 NG/DL
TSH SERPL DL<=0.005 MIU/L-ACNC: 0.03 UIU/ML

## 2018-03-21 ENCOUNTER — HOSPITAL ENCOUNTER (OUTPATIENT)
Dept: RADIOLOGY | Facility: HOSPITAL | Age: 78
Discharge: HOME | End: 2018-03-21
Attending: INTERNAL MEDICINE
Payer: MEDICARE

## 2018-03-21 DIAGNOSIS — E05.00 GRAVES DISEASE: ICD-10-CM

## 2018-03-21 PROCEDURE — 79005 NUCLEAR RX ORAL ADMIN: CPT | Mod: TC

## 2018-03-21 PROCEDURE — 79005 NUCLEAR RX ORAL ADMIN: CPT | Mod: 26,,, | Performed by: RADIOLOGY

## 2018-03-29 DIAGNOSIS — E11.42 CONTROLLED TYPE 2 DIABETES MELLITUS WITH DIABETIC POLYNEUROPATHY, WITHOUT LONG-TERM CURRENT USE OF INSULIN: ICD-10-CM

## 2018-03-29 RX ORDER — GABAPENTIN 300 MG/1
300 CAPSULE ORAL NIGHTLY
Qty: 30 CAPSULE | Refills: 4 | Status: SHIPPED | OUTPATIENT
Start: 2018-03-29 | End: 2018-08-06 | Stop reason: SDUPTHER

## 2018-04-11 ENCOUNTER — OFFICE VISIT (OUTPATIENT)
Dept: PODIATRY | Facility: CLINIC | Age: 78
End: 2018-04-11
Payer: MEDICARE

## 2018-04-11 VITALS
HEIGHT: 61 IN | DIASTOLIC BLOOD PRESSURE: 54 MMHG | WEIGHT: 120 LBS | BODY MASS INDEX: 22.66 KG/M2 | SYSTOLIC BLOOD PRESSURE: 130 MMHG

## 2018-04-11 DIAGNOSIS — B35.1 PAIN DUE TO ONYCHOMYCOSIS OF TOENAILS OF BOTH FEET: ICD-10-CM

## 2018-04-11 DIAGNOSIS — M79.675 PAIN DUE TO ONYCHOMYCOSIS OF TOENAILS OF BOTH FEET: ICD-10-CM

## 2018-04-11 DIAGNOSIS — B35.1 ONYCHOMYCOSIS DUE TO DERMATOPHYTE: Primary | ICD-10-CM

## 2018-04-11 DIAGNOSIS — E11.49 TYPE II DIABETES MELLITUS WITH NEUROLOGICAL MANIFESTATIONS: ICD-10-CM

## 2018-04-11 DIAGNOSIS — I73.9 PAD (PERIPHERAL ARTERY DISEASE): ICD-10-CM

## 2018-04-11 DIAGNOSIS — M79.674 PAIN DUE TO ONYCHOMYCOSIS OF TOENAILS OF BOTH FEET: ICD-10-CM

## 2018-04-11 PROCEDURE — 99499 UNLISTED E&M SERVICE: CPT | Mod: S$GLB,,, | Performed by: PODIATRIST

## 2018-04-11 PROCEDURE — 11721 DEBRIDE NAIL 6 OR MORE: CPT | Mod: Q8,S$GLB,, | Performed by: PODIATRIST

## 2018-04-11 PROCEDURE — 99999 PR PBB SHADOW E&M-EST. PATIENT-LVL III: CPT | Mod: PBBFAC,,, | Performed by: PODIATRIST

## 2018-04-11 RX ORDER — FLUTICASONE PROPIONATE 50 MCG
SPRAY, SUSPENSION (ML) NASAL
COMMUNITY
Start: 2016-12-31 | End: 2018-06-28

## 2018-04-11 RX ORDER — METOPROLOL TARTRATE 50 MG/1
25 TABLET ORAL
COMMUNITY
Start: 2018-01-24 | End: 2018-06-28 | Stop reason: CLARIF

## 2018-04-11 RX ORDER — RAMIPRIL 5 MG/1
5 CAPSULE ORAL
COMMUNITY
Start: 2018-01-24 | End: 2018-06-28 | Stop reason: SDUPTHER

## 2018-04-11 RX ORDER — IBUPROFEN 800 MG/1
800 TABLET ORAL
COMMUNITY
Start: 2017-08-01 | End: 2018-06-28

## 2018-04-11 RX ORDER — CETIRIZINE HYDROCHLORIDE 10 MG/1
TABLET ORAL
COMMUNITY
Start: 2016-12-05 | End: 2018-06-28 | Stop reason: SDUPTHER

## 2018-04-11 RX ORDER — GLIPIZIDE 5 MG/1
5 TABLET ORAL
COMMUNITY
Start: 2017-12-27 | End: 2018-06-28 | Stop reason: SDUPTHER

## 2018-04-11 RX ORDER — MELOXICAM 15 MG/1
15 TABLET ORAL
COMMUNITY
Start: 2018-03-15 | End: 2018-06-28 | Stop reason: SDUPTHER

## 2018-04-11 RX ORDER — PROPYLTHIOURACIL 50 MG/1
50 TABLET ORAL
COMMUNITY
Start: 2018-02-23 | End: 2018-06-18

## 2018-04-11 NOTE — PROGRESS NOTES
Subjective:      Patient ID: Khloe Kendrick is a 77 y.o. female.    Chief Complaint: Diabetes Mellitus (pain in nails Pcp Dr. Marquis  9/6/2017); Diabetic Foot Exam; Nail Care; and Nail Problem    Khloe is a 77 y.o. female who presents to the clinic for evaluation and treatment of high risk feet. Khloe has a past medical history of AMD (age-related macular degeneration), bilateral (6/10/2016); Angina pectoris; Cataract; Coronary artery disease; Diabetes mellitus, type 2; Hyperlipidemia; Hypertension; Hyperthyroidism (8/20/2015); Hypothyroidism; Osteoarthritis; Peripheral arterial disease (4/3/2013); Tobacco dependence; Type 2 diabetes mellitus; and Type 2 diabetes with peripheral circulatory disorder, controlled. The patient's chief complaint is painful, long, thick toenails; especially the hallux nails bilaterally which hurt with light touch. Has hx of PAD. No other major complaints. Has not had DM foot exam in about 9 months. Tries to trim her own nails but this is difficult for her and she is afraid of cutting herself.   This patient has documented high risk feet requiring routine maintenance secondary to diabetes mellitis and those secondary complications of diabetes, as mentioned..    PCP: Aimee Marquis MD    Date Last Seen by PCP:   Chief Complaint   Patient presents with    Diabetes Mellitus     pain in nails Pcp Dr. Marquis  9/6/2017    Diabetic Foot Exam    Nail Care    Nail Problem       Current shoe gear:  Worn flat shoe    Hemoglobin A1C   Date Value Ref Range Status   10/13/2017 5.7 (H) 4.0 - 5.6 % Final     Comment:     According to ADA guidelines, hemoglobin A1c <7.0% represents  optimal control in non-pregnant diabetic patients. Different  metrics may apply to specific patient populations.   Standards of Medical Care in Diabetes-2016.  For the purpose of screening for the presence of diabetes:  <5.7%     Consistent with the absence of diabetes  5.7-6.4%  Consistent with increasing risk for diabetes    (prediabetes)  >or=6.5%  Consistent with diabetes  Currently, no consensus exists for use of hemoglobin A1c  for diagnosis of diabetes for children.  This Hemoglobin A1c assay has significant interference with fetal   hemoglobin   (HbF). The results are invalid for patients with abnormal amounts of   HbF,   including those with known Hereditary Persistence   of Fetal Hemoglobin. Heterozygous hemoglobin variants (HbAS, HbAC,   HbAD, HbAE, HbA2) do not significantly interfere with this assay;   however, presence of multiple variants in a sample may impact the %   interference.     04/21/2017 6.4 (H) 4.5 - 6.2 % Final     Comment:     According to ADA guidelines, hemoglobin A1C <7.0% represents  optimal control in non-pregnant diabetic patients.  Different  metrics may apply to specific populations.   Standards of Medical Care in Diabetes - 2016.  For the purpose of screening for the presence of diabetes:  <5.7%     Consistent with the absence of diabetes  5.7-6.4%  Consistent with increasing risk for diabetes   (prediabetes)  >or=6.5%  Consistent with diabetes  Currently no consensus exists for use of hemoglobin A1C  for diagnosis of diabetes for children.     08/24/2016 6.2 4.5 - 6.2 % Final     Comment:     According to ADA guidelines, hemoglobin A1C <7.0% represents  optimal control in non-pregnant diabetic patients.  Different  metrics may apply to specific populations.   Standards of Medical Care in Diabetes - 2016.  For the purpose of screening for the presence of diabetes:  <5.7%     Consistent with the absence of diabetes  5.7-6.4%  Consistent with increasing risk for diabetes   (prediabetes)  >or=6.5%  Consistent with diabetes  Currently no consensus exists for use of hemoglobin A1C  for diagnosis of diabetes for children.         Chief Complaint   Patient presents with    Diabetes Mellitus     pain in nails Pcp Dr. Marquis  9/6/2017    Diabetic Foot Exam    Nail Care    Nail Problem     Patient Active  Problem List   Diagnosis    Diabetes mellitus type 2, controlled    Essential hypertension    Combined hyperlipidemia associated with type 2 diabetes mellitus    Osteopenia    Osteoarthritis    Tobacco use    Asymptomatic carotid artery stenosis without infarction    Peripheral arterial disease    Atherosclerosis of aorta    Angina pectoris    Coronary artery disease    Pseudophakia    Senile nuclear sclerosis    Hyperthyroidism    Weight loss    Allergic rhinitis    Nonexudative age-related macular degeneration, bilateral, advanced atrophic with subfoveal involvement    Bilateral posterior capsular opacification    Vitreomacular traction syndrome of left eye    Pyogenic arthritis of right knee joint    Sepsis     Current Outpatient Prescriptions on File Prior to Visit   Medication Sig Dispense Refill    ACCU-CHEK SMARTVIEW TEST STRIP Strp       ALCOHOL PREP PADS PadM       alendronate (FOSAMAX) 70 MG tablet Take 1 tablet (70 mg total) by mouth every 7 days. 12 tablet 3    allopurinol (ZYLOPRIM) 100 MG tablet Take 1 tablet (100 mg total) by mouth once daily. 90 tablet 1    aspirin 81 MG Chew Take 81 mg by mouth once daily.      cetirizine (ZYRTEC) 10 MG tablet TAKE 1 TABLET (10 MG TOTAL) BY MOUTH DAILY AS NEEDED FOR ALLERGIES OR RHINITIS.  5    diclofenac sodium (PENNSAID) 2 % SoPk Apply topically once as needed.      fluticasone (FLONASE) 50 mcg/actuation nasal spray USE 1 SPRAY BY EACH NARE ROUTE ONCE DAILY.  5    gabapentin (NEURONTIN) 300 MG capsule TAKE 1 CAPSULE (300 MG TOTAL) BY MOUTH EVERY EVENING. 30 capsule 4    glipiZIDE (GLUCOTROL) 5 MG tablet Take 1 tablet (5 mg total) by mouth 2 (two) times daily with meals. 180 tablet 3    hydrocodone-acetaminophen 10-325mg (NORCO)  mg Tab Take by mouth every 12 (twelve) hours as needed for Pain.      ibuprofen (ADVIL,MOTRIN) 800 MG tablet Take 1 tablet (800 mg total) by mouth 3 (three) times daily. 60 tablet 0    lancets 30  gauge Misc       lancing device Misc       lidocaine-prilocaine (EMLA) cream APPLY TOPICALLY AS NEEDED. TO LEFT SHOULDER 30 g 0    meclizine (ANTIVERT) 25 mg tablet Take 1 tablet (25 mg total) by mouth 3 (three) times daily as needed for Dizziness. 90 tablet 1    metoprolol tartrate (LOPRESSOR) 50 MG tablet TAKE 1 TABLET (50 MG TOTAL) BY MOUTH ONCE DAILY. 90 tablet 1    miscellaneous medical supply Kit Needs blood pressure machine to monitor BP once a day. 1 kit 1    nitroGLYCERIN (NITROSTAT) 0.4 MG SL tablet Place 1 tablet (0.4 mg total) under the tongue every 5 (five) minutes as needed for Chest pain. 1 Tablet, Sublingual Sublingual as needed .  as directed 90 tablet 0    propylthiouracil (PTU) 50 mg Tab Take 1 tablet (50 mg total) by mouth once daily. 30 tablet 11    ramipril (ALTACE) 5 MG capsule TAKE 1 CAPSULE (5 MG TOTAL) BY MOUTH ONCE DAILY.  1    tizanidine (ZANAFLEX) 4 MG tablet Take 1 tablet (4 mg total) by mouth every 8 (eight) hours. 90 tablet 1    tramadol (ULTRAM) 50 mg tablet Take 1 tablet (50 mg total) by mouth every 6 (six) hours as needed for Pain. 60 tablet 0    cilostazol (PLETAL) 50 MG Tab TAKE 1 TABLET (50 MG TOTAL) BY MOUTH ONCE DAILY. 90 tablet 1    colchicine 0.6 mg tablet Take 2 tablets (1.2 mg total) by mouth once daily. At onset of gout symptoms. May repeat tablet every 12 hours 30 tablet 0    [DISCONTINUED] NORMAL SALINE FLUSH 0.9 % injection        No current facility-administered medications on file prior to visit.      Allergies   Allergen Reactions    Pravastatin Other (See Comments)     Muscle pain     Past Surgical History:   Procedure Laterality Date    ABDOMINAL SURGERY      APPENDECTOMY      CARDIAC SURGERY  2008    CABG    CATARACT EXTRACTION Right     CATARACT EXTRACTION W/  INTRAOCULAR LENS IMPLANT  8/13/2015    Dr. Blount ( [facility])    EYE SURGERY  2011    Rt eye cataract    HERNIA REPAIR      HYSTERECTOMY      HYSTEROTOMY N/A 33 yrs old     Family  History   Problem Relation Age of Onset    Diabetes Mother     Coronary artery disease Father     Cataracts Father     Heart disease Brother     Cataracts Sister     No Known Problems Maternal Aunt     No Known Problems Maternal Uncle     No Known Problems Paternal Aunt     No Known Problems Paternal Uncle     No Known Problems Maternal Grandmother     No Known Problems Maternal Grandfather     No Known Problems Paternal Grandmother     No Known Problems Paternal Grandfather     Amblyopia Neg Hx     Blindness Neg Hx     Cancer Neg Hx     Glaucoma Neg Hx     Hypertension Neg Hx     Macular degeneration Neg Hx     Retinal detachment Neg Hx     Strabismus Neg Hx     Stroke Neg Hx     Thyroid disease Neg Hx      Social History     Social History    Marital status:      Spouse name: N/A    Number of children: N/A    Years of education: N/A     Occupational History    Not on file.     Social History Main Topics    Smoking status: Current Every Day Smoker     Packs/day: 1.00    Smokeless tobacco: Current User    Alcohol use No    Drug use: No    Sexual activity: Not on file     Other Topics Concern    Not on file     Social History Narrative    No narrative on file       Review of Systems   Constitution: Negative for chills, fever and weakness.   Cardiovascular: Negative for chest pain, claudication and leg swelling.   Respiratory: Negative for cough and shortness of breath.    Hematologic/Lymphatic: Bruises/bleeds easily.   Skin: Positive for dry skin and nail changes. Negative for itching and rash.   Musculoskeletal: Positive for arthritis and joint pain. Negative for back pain, joint swelling and muscle weakness.   Gastrointestinal: Negative for diarrhea, nausea and vomiting.   Neurological: Positive for numbness, paresthesias and sensory change. Negative for tremors.   Psychiatric/Behavioral: Negative for altered mental status and hallucinations.           Objective:      "  Vitals:    04/11/18 1514   BP: (!) 130/54   Weight: 54.4 kg (120 lb)   Height: 5' 1" (1.549 m)   PainSc:   8   PainLoc: Toe       Physical Exam   Constitutional:   General: Pt. is well-developed, well-nourished, appears stated age, in no acute distress, alert and oriented x 3. No evidence of depression, anxiety, or agitation. Calm, cooperative, and communicative. Appropriate interactions and affect.       Cardiovascular:   Dorsalis pedis and posterior tibial pulses are diminished Bilaterally. Toes are cool to touch. Feet are warm proximally.There is decreased digital hair. Skin is atrophic. Mild edema with mild spider veins b/l.    Musculoskeletal:        Right ankle: She exhibits normal range of motion and no swelling. No tenderness. Achilles tendon exhibits no pain and no defect.        Left ankle: She exhibits normal range of motion and no swelling. No tenderness. Achilles tendon exhibits no pain and no defect.        Right foot: There is deformity (syndactyly of digits 2-3 ). There is normal range of motion and no tenderness.        Left foot: There is normal range of motion and no tenderness.   Adequate joint range of motion without pain, limitation, nor crepitation Bilateral feet and ankle joints. Muscle strength is 5/5 in all groups bilaterally.    Decreased stride, station of gait.  apropulsive toe off.  Increased angle and base of gait.    Patient has hammertoes of digits 2-5 bilateral partially reducible without symptom today.    Visible and palpable bunion without pain at dorsomedial 1st metatarsal head right and left.  Hallux abducted right and left partially reducible, tracks laterally without being track bound.  No ecchymosis, erythema, edema, or cardinal signs infection or signs of trauma same foot.    Fat pad atrophy to heels and met heads bilateral   Neurological: A sensory deficit is present.   Stopover-Constance 5.07 monofilamant testing is diminished Juan feet. Sharp/dull sensation diminished " Bilaterally. Light touch absent Bilaterally.       Skin: Skin is warm, dry and intact. No abrasion, no ecchymosis, no lesion and no rash noted. She is not diaphoretic. No cyanosis or erythema. No pallor. Nails show no clubbing.   Toenails 1-5 bilaterally are elongated by 2-3 mm, thickened by 2-3 mm, discolored/yellowed, dystrophic, brittle with subungual debris. Tender to distal nail plate pressure of both hallux, without periungual skin abnormality of each.     Interdigital Spaces clean, dry and without evidence of break in skin integrity   Psychiatric: She has a normal mood and affect. Her speech is normal.   Nursing note and vitals reviewed.          Assessment:       Encounter Diagnoses   Name Primary?    Type II diabetes mellitus with neurological manifestations     PAD (peripheral artery disease)     Onychomycosis due to dermatophyte Yes    Pain due to onychomycosis of toenails of both feet          Plan:       Khloe was seen today for diabetes mellitus, diabetic foot exam, nail care and nail problem.    Diagnoses and all orders for this visit:    Onychomycosis due to dermatophyte    Type II diabetes mellitus with neurological manifestations    PAD (peripheral artery disease)    Pain due to onychomycosis of toenails of both feet      I counseled the patient on her conditions, their implications and medical management.    - Shoe inspection. Diabetic Foot Education. Patient reminded of the importance of good nutrition and blood sugar control to help prevent podiatric complications of diabetes. Patient instructed on proper foot hygeine. We discussed wearing proper shoe gear, daily foot inspections, never walking without protective shoe gear, caution putting sharp instruments to feet     - Discussed DM foot care:  Wear comfortable, proper fitting shoes. Wash feet daily. Dry well. After drying, apply moisturizer to feet (no lotion to webspaces). Inspect feet daily for skin breaks, blisters, swelling, or redness.  Wear cotton socks (preferably white)  Change socks every day. Do NOT walk barefoot. Do NOT use heating pads or warm/hot water soaks     With patient's permission, nails were aggressively reduced and debrided 1,2,3,4, 5 R and 1,2, 3,4,5 L and filed to their soft tissue attachment mechanically and with electric , removing all offending nail and debris. Patient tolerated this well and no blood was drawn. Patient reports relief following the procedure.     Discussed application of Bhanu's vaporub on affected toenails for up to 1 year for improvement in appearance and fungal infection.     Advised to come in sooner than 9 months to prevent painful symptoms.     RTC 3-4 months, sooner PRN

## 2018-04-13 ENCOUNTER — PES CALL (OUTPATIENT)
Dept: ADMINISTRATIVE | Facility: CLINIC | Age: 78
End: 2018-04-13

## 2018-04-25 DIAGNOSIS — I70.0 ATHEROSCLEROSIS OF AORTA: Chronic | ICD-10-CM

## 2018-04-25 DIAGNOSIS — I73.9 PERIPHERAL ARTERIAL DISEASE: Chronic | ICD-10-CM

## 2018-04-25 RX ORDER — CILOSTAZOL 50 MG/1
50 TABLET ORAL DAILY
Qty: 90 TABLET | Refills: 0 | Status: SHIPPED | OUTPATIENT
Start: 2018-04-25 | End: 2018-09-16 | Stop reason: SDUPTHER

## 2018-04-25 RX ORDER — RAMIPRIL 5 MG/1
CAPSULE ORAL
Qty: 90 CAPSULE | Refills: 0 | Status: SHIPPED | OUTPATIENT
Start: 2018-04-25 | End: 2018-06-28 | Stop reason: SDUPTHER

## 2018-04-25 RX ORDER — METOPROLOL TARTRATE 50 MG/1
TABLET ORAL
Qty: 90 TABLET | Refills: 0 | Status: SHIPPED | OUTPATIENT
Start: 2018-04-25 | End: 2018-06-28 | Stop reason: CLARIF

## 2018-05-14 ENCOUNTER — TELEPHONE (OUTPATIENT)
Dept: FAMILY MEDICINE | Facility: CLINIC | Age: 78
End: 2018-05-14

## 2018-05-14 DIAGNOSIS — M25.50 ARTHRALGIA OF MULTIPLE JOINTS: Primary | ICD-10-CM

## 2018-05-14 NOTE — TELEPHONE ENCOUNTER
----- Message from Ashleigh Mcnally sent at 5/14/2018  3:16 PM CDT -----  Contact: self  [facility] calling to get a back dated referral for 3/15/18. Pt was seen for a joint pain. Please send referral or call [phone]      [facility]  Dr Fredis Cantu  [phone] fax

## 2018-05-15 ENCOUNTER — TELEPHONE (OUTPATIENT)
Dept: FAMILY MEDICINE | Facility: CLINIC | Age: 78
End: 2018-05-15

## 2018-05-15 NOTE — TELEPHONE ENCOUNTER
----- Message from Dianne Faust sent at 5/15/2018  8:20 AM CDT -----  Contact: Dr. Cantu office - [facility] requesting for nurse to change date on pt paper work. Hilary needs March 15th 2018 to be changed to May 14th 2018. Please call [phone] fax # [phone]

## 2018-05-15 NOTE — TELEPHONE ENCOUNTER
I do not know how to back date this.    Pt is requesting an appt to see DR to establish care and discuss her medications.  doesn't have anything till the 18th at the [facility] and the pt would like to know if DR can possibly see her before the 18th at the [facility]?  pls advise, mary

## 2018-05-15 NOTE — TELEPHONE ENCOUNTER
----- Message from Nancy Carbajal sent at 5/15/2018  3:14 PM CDT -----  Contact: Dr. Cantu office - [facility] is asking that referral is resent,  back dated to date on 03/15.      Please call [phone] -- fax # [phone]

## 2018-06-04 ENCOUNTER — CLINICAL SUPPORT (OUTPATIENT)
Dept: SMOKING CESSATION | Facility: CLINIC | Age: 78
End: 2018-06-04
Payer: COMMERCIAL

## 2018-06-04 DIAGNOSIS — F17.200 NICOTINE DEPENDENCE: Primary | ICD-10-CM

## 2018-06-04 PROCEDURE — 99407 BEHAV CHNG SMOKING > 10 MIN: CPT | Mod: S$GLB,,,

## 2018-06-04 NOTE — PROGRESS NOTES
Successful contact with patient regarding tobacco cessation quit #2. Pt states, she was unable to become tobacco free during the program and would like to return. Pt scheduled for quit #3 on 6/21/2018.    Will follow up with her progress in 3 months.

## 2018-06-14 ENCOUNTER — CLINICAL SUPPORT (OUTPATIENT)
Dept: SMOKING CESSATION | Facility: CLINIC | Age: 78
End: 2018-06-14
Payer: COMMERCIAL

## 2018-06-14 ENCOUNTER — LAB VISIT (OUTPATIENT)
Dept: LAB | Facility: HOSPITAL | Age: 78
End: 2018-06-14
Attending: INTERNAL MEDICINE
Payer: MEDICARE

## 2018-06-14 DIAGNOSIS — F17.200 NICOTINE DEPENDENCE: Primary | ICD-10-CM

## 2018-06-14 DIAGNOSIS — E05.00 GRAVES DISEASE: ICD-10-CM

## 2018-06-14 LAB
T3 SERPL-MCNC: 41 NG/DL
T4 FREE SERPL-MCNC: <0.4 NG/DL
TSH SERPL DL<=0.005 MIU/L-ACNC: 69.23 UIU/ML

## 2018-06-14 PROCEDURE — 84480 ASSAY TRIIODOTHYRONINE (T3): CPT

## 2018-06-14 PROCEDURE — 99404 PREV MED CNSL INDIV APPRX 60: CPT | Mod: S$GLB,,,

## 2018-06-14 PROCEDURE — 99999 PR PBB SHADOW E&M-EST. PATIENT-LVL I: CPT | Mod: PBBFAC,,,

## 2018-06-14 PROCEDURE — 84439 ASSAY OF FREE THYROXINE: CPT

## 2018-06-14 PROCEDURE — 84443 ASSAY THYROID STIM HORMONE: CPT

## 2018-06-14 PROCEDURE — 36415 COLL VENOUS BLD VENIPUNCTURE: CPT | Mod: PO

## 2018-06-14 RX ORDER — IBUPROFEN 200 MG
1 TABLET ORAL DAILY
Qty: 14 PATCH | Refills: 0 | Status: SHIPPED | OUTPATIENT
Start: 2018-06-14 | End: 2018-06-28

## 2018-06-14 RX ORDER — DM/P-EPHED/ACETAMINOPH/DOXYLAM 30-7.5/3
2 LIQUID (ML) ORAL
Qty: 108 LOZENGE | Refills: 0 | Status: SHIPPED | OUTPATIENT
Start: 2018-06-14 | End: 2018-07-14

## 2018-06-14 NOTE — Clinical Note
Patient presents for intake smoking 1 pk of cigarettes per day, after assessment and discussion recommend the nicotine patch daily 21mg in conjunction with the nicotine lozenge 4mg daily PRN, recommend an abrupt quit this attempt, rate reduction was done last attempt and was not successful, discussed strategies to help cut back and to help break the routine and habit associated with smoking, intake handout provided to the patient and discussed, all prescribed NRT discussed in depth as well as tobacco cessation medication s/e as well as usage discussed, contact card provided to the patient. Will continue to follow every two weeks while in the program.

## 2018-06-28 ENCOUNTER — CLINICAL SUPPORT (OUTPATIENT)
Dept: SMOKING CESSATION | Facility: CLINIC | Age: 78
End: 2018-06-28
Payer: COMMERCIAL

## 2018-06-28 ENCOUNTER — OFFICE VISIT (OUTPATIENT)
Dept: FAMILY MEDICINE | Facility: CLINIC | Age: 78
End: 2018-06-28
Payer: MEDICARE

## 2018-06-28 VITALS
TEMPERATURE: 98 F | SYSTOLIC BLOOD PRESSURE: 122 MMHG | DIASTOLIC BLOOD PRESSURE: 70 MMHG | HEIGHT: 61 IN | OXYGEN SATURATION: 97 % | HEART RATE: 67 BPM | WEIGHT: 140.44 LBS | BODY MASS INDEX: 26.51 KG/M2

## 2018-06-28 DIAGNOSIS — Z00.00 ANNUAL PHYSICAL EXAM: Primary | ICD-10-CM

## 2018-06-28 DIAGNOSIS — E11.9 CONTROLLED TYPE 2 DIABETES MELLITUS WITHOUT COMPLICATION, WITHOUT LONG-TERM CURRENT USE OF INSULIN: ICD-10-CM

## 2018-06-28 DIAGNOSIS — Z12.31 ENCOUNTER FOR SCREENING MAMMOGRAM FOR BREAST CANCER: ICD-10-CM

## 2018-06-28 DIAGNOSIS — F17.200 NICOTINE DEPENDENCE: ICD-10-CM

## 2018-06-28 DIAGNOSIS — M25.50 ARTHRALGIA OF MULTIPLE JOINTS: ICD-10-CM

## 2018-06-28 DIAGNOSIS — I10 ESSENTIAL HYPERTENSION: Chronic | ICD-10-CM

## 2018-06-28 DIAGNOSIS — I20.9 ANGINA PECTORIS: Chronic | ICD-10-CM

## 2018-06-28 PROBLEM — A41.9 SEPSIS: Status: RESOLVED | Noted: 2017-04-22 | Resolved: 2018-06-28

## 2018-06-28 PROBLEM — M00.9 PYOGENIC ARTHRITIS OF RIGHT KNEE JOINT: Status: RESOLVED | Noted: 2017-04-20 | Resolved: 2018-06-28

## 2018-06-28 PROCEDURE — 99499 UNLISTED E&M SERVICE: CPT | Mod: S$GLB,,, | Performed by: FAMILY MEDICINE

## 2018-06-28 PROCEDURE — 99999 PR PBB SHADOW E&M-EST. PATIENT-LVL IV: CPT | Mod: PBBFAC,,, | Performed by: FAMILY MEDICINE

## 2018-06-28 PROCEDURE — 3074F SYST BP LT 130 MM HG: CPT | Mod: CPTII,S$GLB,, | Performed by: FAMILY MEDICINE

## 2018-06-28 PROCEDURE — 3078F DIAST BP <80 MM HG: CPT | Mod: CPTII,S$GLB,, | Performed by: FAMILY MEDICINE

## 2018-06-28 PROCEDURE — 99999 PR PBB SHADOW E&M-EST. PATIENT-LVL I: CPT | Mod: PBBFAC,,,

## 2018-06-28 PROCEDURE — 99397 PER PM REEVAL EST PAT 65+ YR: CPT | Mod: S$GLB,,, | Performed by: FAMILY MEDICINE

## 2018-06-28 PROCEDURE — 99402 PREV MED CNSL INDIV APPRX 30: CPT | Mod: S$GLB,,,

## 2018-06-28 RX ORDER — MELOXICAM 15 MG/1
15 TABLET ORAL DAILY
Qty: 90 TABLET | Refills: 1 | Status: SHIPPED | OUTPATIENT
Start: 2018-06-28 | End: 2019-01-07 | Stop reason: SDUPTHER

## 2018-06-28 RX ORDER — NITROGLYCERIN 0.4 MG/1
0.4 TABLET SUBLINGUAL EVERY 5 MIN PRN
Qty: 50 TABLET | Refills: 1 | Status: SHIPPED | OUTPATIENT
Start: 2018-06-28

## 2018-06-28 RX ORDER — LANCING DEVICE
1 EACH MISCELLANEOUS DAILY
Qty: 1 EACH | Refills: 0 | Status: SHIPPED | OUTPATIENT
Start: 2018-06-28 | End: 2019-06-28

## 2018-06-28 RX ORDER — METOPROLOL SUCCINATE 25 MG/1
25 TABLET, EXTENDED RELEASE ORAL DAILY
Qty: 90 TABLET | Refills: 1 | Status: SHIPPED | OUTPATIENT
Start: 2018-06-28 | End: 2019-01-07 | Stop reason: SDUPTHER

## 2018-06-28 RX ORDER — INSULIN PUMP SYRINGE, 3 ML
EACH MISCELLANEOUS
Qty: 1 EACH | Refills: 0 | Status: SHIPPED | OUTPATIENT
Start: 2018-06-28 | End: 2019-06-28

## 2018-06-28 RX ORDER — IBUPROFEN 200 MG
1 TABLET ORAL DAILY
Qty: 14 PATCH | Refills: 0 | Status: SHIPPED | OUTPATIENT
Start: 2018-06-28 | End: 2018-07-28

## 2018-06-28 RX ORDER — METOPROLOL SUCCINATE 25 MG/1
25 TABLET, EXTENDED RELEASE ORAL DAILY
COMMUNITY
End: 2018-06-28 | Stop reason: SDUPTHER

## 2018-06-28 RX ORDER — MELOXICAM 15 MG/1
15 TABLET ORAL
COMMUNITY
Start: 2018-04-19 | End: 2018-06-28 | Stop reason: SDUPTHER

## 2018-06-28 NOTE — PROGRESS NOTES
Individual Follow-Up Form    6/28/2018    Quit Date: not selected at this time     Clinical Status of Patient: Outpatient    Length of Service: 30 minutes    Continuing Medication: yes  Patches or Nicotine Lozenges    Other Medications: n/a     Target Symptoms: Withdrawal and medication side effects. The following were  rated moderate (3) to severe (4) on TCRS:  · Moderate (3): 0  · Severe (4): 0    Comments: patient presents for follow up smoking 10 cigarettes per day, prior to the start of the program she was smoking a pack of cigarettes per day, it was recommended that she do an abrupt quit however patient decided to do rate reduction, she is currently on the 21mg nicotine patches daily, she is using the nicotine lozegne a few times per day, recommend that she continue with the nicotine patch 21mg however remove an hour before bed, she repots having nightmares, recommend substituting every other cigarette for a lozenge this way she is cutting down to 5 cigarettes per day, session handout provided and discussed, will continue to monitor and follow     Diagnosis: F17.210    Next Visit: 2 weeks

## 2018-06-28 NOTE — PATIENT INSTRUCTIONS
Stable Angina  The chest discomfort you have appears to be coming from your heart -- a condition called angina. Angina is a pain in the heart due to poor blood flow to the heart muscle. This can occur when plaque builds up on the artery wall or a blood clot forms in one or more of the small blood vessels that deliver oxygen to the heart muscle. Plaque is a fatty material made up of cholesterol, calcium deposits, and other materials.  Exercise, increased activity, emotional upset, or stress can trigger this pain. With proper treatment and lifestyle changes to reduce risk factors, most people with angina are able to maintain a full and active life.  Angina is not a heart attack. But if angina pain is severe or prolonged, it is a sign of an impending heart attack, also called acute myocardial infarction, or AMI. Your angina is under control at this time. Therefore, it is safe for you to go home. Follow up as instructed by your doctor for any further tests and office visits.    Home care  · Rest at home today and avoid any emotional or physically strenuous activity.  · Take medicine (usually nitroglycerin) for chest pain exactly as prescribed. Keep your nitroglycerin with you at all times.  · When taking nitroglycerin for angina, sit or lie down. The medicine may make you feel dizzy.  ¨ Place one tablet under your tongue, or between your lip and gum, or between your cheek and gum. Let the tablet dissolve completely; do not chew or swallow the tablet.  ¨ If you use a spray, then spray once on or under your tongue. Do not inhale. Right after you use the spray, close your mouth. Wait a few seconds before you swallow.  ¨ After taking one tablet or spraying once, continue sitting or lying for 5 minutes.  ¨ If the angina goes away completely, rest awhile and continue your normal routine.  Note: Your healthcare provider may give you slightly different instructions than those above. If so, follow them  carefully.  Prevention  · Learn how to take your own blood pressure. Keep a record of your results. Ask your doctor which readings mean that you need medical attention. Reduce salt intake and follow lifestyle change instructions if you have high blood pressure (hypertension).  · Maintain a healthy weight. Get help to lose any extra pounds. Talk to your doctor about a safe diet program. Sudden large weight losses can be dangerous.  · If you have diabetes, talk to your doctor about healthy control of your blood sugar.  · Begin an exercise program. Ask your doctor how to get started. You can benefit from simple activities such as walking or gardening. Short, high intensity exercise sessions may also be beneficial.  · Break the smoking habit. Enroll in a stop-smoking program to improve your chances of success.  · Get adequate rest.  · Avoid stressful situations. Learn stress-management techniques.  Follow-up care  Follow up with your doctor, or as advised.  If an X-ray was done , it will be reviewed by another specialist. You will be notified of any new findings that may affect your care.  Call 911  This is the fastest and safest way to get to the nearest emergency department. The paramedics can also start treatment on the way to the hospital, saving valuable time for your heart.  · If angina gets worse, it continues, or if it stops and returns, call 911 immediately, Do not delay. You may be having a heart attack.  · After you call 911, take a second nitroglycerine tablet or spray unless instructed otherwise. When repeating doses, sit down if possible because it can make you feel lightheaded or dizzy. Wait another 5 minutes. If the angina still does not go away, take a third tablet or spray. Do not take more than 3 tablets or sprays within 15 minutes. Stay on the phone with 911 for further instructions.  · Your healthcare provider may give you slightly different instructions than those above. If so, follow them  "carefully.  Don't wait until your symptoms are severe to call 911. Other reasons to call 911 besides chest pain include:  · Trouble breathing  · Feeling lightheaded, faint, or dizzy  · Rapid heart beat  · Slower than usual heart rate compared to your normal  · Angina with weakness, dizziness, fainting, heavy sweating, nausea, or vomiting  · Extreme drowsiness, or confusion  · Weakness of an arm or leg or on one side of the face  · Difficulty with speech or vision  When to seek medical advice  Remember, the signs and symptoms of a heart attack are not always like they are on TV. Sometimes they are not so obvious. You may only feel weak or just "not right." If it is not clear or if you have any doubt, call for advice.  · Seek help if there is a change in the type of pain, if it feels different, or if your symptoms are mild.  · Do not drive yourself. Have someone else drive. If no one can drive you, call 911.  · If your doctor has given you medicine to take when you have symptoms, take them, but do not delay getting  help.  · Do not delay. Fast diagnosis and treatment can prevent or  limit the amount of heart damage during a heart attack or stroke.  · Do not go to your doctor's office or a clinic because they may not be able to provide all the testing and treatment you need.  Date Last Reviewed: 12/30/2015  © 1461-6463 [employer]. [address] [place] [address], [place] [address]. All rights reserved. This information is not intended as a substitute for professional medical care. Always follow your healthcare professional's instructions.        "

## 2018-06-28 NOTE — Clinical Note
Comments: patient presents for follow up smoking 10 cigarettes per day, prior to the start of the program she was smoking a pack of cigarettes per day, it was recommended that she do an abrupt quit however patient decided to do rate reduction, she is currently on the 21mg nicotine patches daily, she is using the nicotine lozegne a few times per day, recommend that she continue with the nicotine patch 21mg however remove an hour before bed, she repots having nightmares, recommend substituting every other cigarette for a lozenge this way she is cutting down to 5 cigarettes per day, session handout provided and discussed, will continue to monitor and follow

## 2018-06-28 NOTE — PROGRESS NOTES
Routine Office Visit    Patient Name: Khloe Kendrick    : 1940  MRN: [id number]    Subjective:  Khloe is a 77 y.o. female who presents today for     1. Annual physical   2. Hypertension - pt has been taking metoprolol 25mg once daily. She was given 50mg dose and was cutting dose in half. She is requesting a new prescription for 25mg dose. She reports no side effect from current regimen. Blood pressure is well controlled.   3. Multiple joint arthritis - she has seen rheumatology and was prescribed mobic. She is requesting a refill on mobic stating that it does help her with her pain and allows for her to do her ADLs. She reports no side effect from current regimen   4. Diabetes - well controlled. She is requesting a new meter and test strips covered under her insurance plan.     Review of Systems   Constitutional: Negative for chills and fever.   HENT: Negative for congestion.    Eyes: Negative for blurred vision.   Respiratory: Negative for cough.    Cardiovascular: Negative for chest pain.   Gastrointestinal: Negative for abdominal pain, constipation, diarrhea, heartburn, nausea and vomiting.   Genitourinary: Negative for dysuria.   Musculoskeletal: Negative for myalgias.   Skin: Negative for itching and rash.   Neurological: Negative for dizziness and headaches.   Psychiatric/Behavioral: Negative for depression.       Active Problem List  Patient Active Problem List   Diagnosis    Diabetes mellitus type 2, controlled    Essential hypertension    Combined hyperlipidemia associated with type 2 diabetes mellitus    Osteopenia    Osteoarthritis    Tobacco use    Asymptomatic carotid artery stenosis without infarction    Peripheral arterial disease    Atherosclerosis of aorta    Angina pectoris    Coronary artery disease    Pseudophakia    Senile nuclear sclerosis    Hyperthyroidism    Weight loss    Allergic rhinitis    Nonexudative age-related macular degeneration, bilateral, advanced  atrophic with subfoveal involvement    Bilateral posterior capsular opacification    Vitreomacular traction syndrome of left eye       Past Surgical History  Past Surgical History:   Procedure Laterality Date    ABDOMINAL SURGERY      APPENDECTOMY      CARDIAC SURGERY  2008    CABG    CATARACT EXTRACTION Right     CATARACT EXTRACTION W/  INTRAOCULAR LENS IMPLANT  8/13/2015    Dr. Blount ( [facility])    EYE SURGERY  2011    Rt eye cataract    HERNIA REPAIR      HYSTERECTOMY      HYSTEROTOMY N/A 33 yrs old       Family History  Family History   Problem Relation Age of Onset    Diabetes Mother     Coronary artery disease Father     Cataracts Father     Heart disease Brother     Cataracts Sister     No Known Problems Maternal Aunt     No Known Problems Maternal Uncle     No Known Problems Paternal Aunt     No Known Problems Paternal Uncle     No Known Problems Maternal Grandmother     No Known Problems Maternal Grandfather     No Known Problems Paternal Grandmother     No Known Problems Paternal Grandfather     Amblyopia Neg Hx     Blindness Neg Hx     Cancer Neg Hx     Glaucoma Neg Hx     Hypertension Neg Hx     Macular degeneration Neg Hx     Retinal detachment Neg Hx     Strabismus Neg Hx     Stroke Neg Hx     Thyroid disease Neg Hx        Social History  Social History     Social History    Marital status:      Spouse name: N/A    Number of children: N/A    Years of education: N/A     Occupational History    Not on file.     Social History Main Topics    Smoking status: Current Every Day Smoker     Packs/day: 1.00    Smokeless tobacco: Current User    Alcohol use No    Drug use: No    Sexual activity: Not on file     Other Topics Concern    Not on file     Social History Narrative    No narrative on file       Medications and Allergies  Reviewed and updated.   Current Outpatient Prescriptions   Medication Sig    ALCOHOL PREP PADS PadM     alendronate (FOSAMAX) 70 MG  tablet Take 1 tablet (70 mg total) by mouth every 7 days.    aspirin 81 MG Chew Take 81 mg by mouth once daily.    calcium carbonate 1250 MG capsule Take 1,250 mg by mouth.    cetirizine (ZYRTEC) 10 MG tablet TAKE 1 TABLET (10 MG TOTAL) BY MOUTH DAILY AS NEEDED FOR ALLERGIES OR RHINITIS.    cilostazol (PLETAL) 50 MG Tab TAKE 1 TABLET (50 MG TOTAL) BY MOUTH ONCE DAILY.    gabapentin (NEURONTIN) 300 MG capsule TAKE 1 CAPSULE (300 MG TOTAL) BY MOUTH EVERY EVENING.    glipiZIDE (GLUCOTROL) 5 MG tablet Take 1 tablet (5 mg total) by mouth 2 (two) times daily with meals.    lancets 30 gauge Misc     levothyroxine (SYNTHROID) 75 MCG tablet Take 1 tablet (75 mcg total) by mouth once daily.    meloxicam (MOBIC) 15 MG tablet Take 1 tablet (15 mg total) by mouth once daily.    metoprolol succinate (TOPROL-XL) 25 MG 24 hr tablet Take 1 tablet (25 mg total) by mouth once daily.    nicotine polacrilex 2 MG Lozg Take 1 lozenge (2 mg total) by mouth as needed (1 per hour as needed in place of a cigarrette max of 15 per day).    nitroGLYCERIN (NITROSTAT) 0.4 MG SL tablet Place 1 tablet (0.4 mg total) under the tongue every 5 (five) minutes as needed for Chest pain.    ramipril (ALTACE) 5 MG capsule TAKE 1 CAPSULE (5 MG TOTAL) BY MOUTH ONCE DAILY.    blood sugar diagnostic Strp 1 strip by Misc.(Non-Drug; Combo Route) route once daily.    blood-glucose meter kit Use as instructed. Please provide glucometer covered under insurance plan.    colchicine 0.6 mg tablet Take 2 tablets (1.2 mg total) by mouth once daily. At onset of gout symptoms. May repeat tablet every 12 hours    lancing device Misc 1 Device by Misc.(Non-Drug; Combo Route) route once daily.    nicotine (NICODERM CQ) 21 mg/24 hr Place 1 patch onto the skin once daily.     No current facility-administered medications for this visit.        Physical Exam  /70 (BP Location: Right arm, Patient Position: Sitting, BP Method: Medium (Manual))   Pulse 67    "Temp 97.9 °F (36.6 °C) (Oral)   Ht 5' 1" (1.549 m)   Wt 63.7 kg (140 lb 6.9 oz)   SpO2 97%   BMI 26.53 kg/m²   Physical Exam   Constitutional: She is oriented to person, place, and time. She appears well-developed and well-nourished.   HENT:   Head: Normocephalic and atraumatic.   Eyes: Conjunctivae and EOM are normal. Pupils are equal, round, and reactive to light.   Neck: Normal range of motion. Neck supple.   Cardiovascular: Normal rate, regular rhythm and normal heart sounds.  Exam reveals no gallop and no friction rub.    No murmur heard.  Pulmonary/Chest: Breath sounds normal. No respiratory distress.   Abdominal: Soft. Bowel sounds are normal. She exhibits no distension. There is no tenderness.   Musculoskeletal: Normal range of motion.   Lymphadenopathy:     She has no cervical adenopathy.   Neurological: She is alert and oriented to person, place, and time.   Skin: Skin is warm.   Psychiatric: She has a normal mood and affect.     Protective Sensation (w/ 10 gram monofilament):  Right: Decreased  Left: Decreased    Visual Inspection:  Normal -  Bilateral    Pedal Pulses:   Right: Present  Left: Present    Posterior tibialis:   Right:Present  Left: Present        Assessment/Plan:  Khloe Kendrick is a 77 y.o. female who presents today for :    Annual physical exam  Health Maintenance       Date Due Completion Date    Lipid Panel 08/24/2017 8/24/2016    Mammogram 09/08/2017 9/8/2016    Urine Microalbumin 12/12/2017 12/12/2016    Hemoglobin A1c 04/13/2018 10/13/2017    Influenza Vaccine 08/01/2018 11/1/2016    Override on 4/6/2016: Declined    Override on 1/5/2015: Declined    Eye Exam 08/28/2018 8/28/2017    Foot Exam 06/28/2019 6/28/2018 (Done)    Override on 6/28/2018: Done    Override on 7/11/2017: Done    Override on 1/23/2017: Done    Override on 2/18/2015: Done (Dr.Nneka Forte/Podiatry)    DEXA SCAN 08/10/2020 8/10/2017    TETANUS VACCINE 05/18/2026 5/18/2016        I addressed all major concerns " as it related to health maintenance.  All were ordered and scheduled based on the patients wishes.  Any additional health maintenance will be readdressed at the next physical if declined or deferred by the patient.      Encounter for screening mammogram for breast cancer  -     Mammo Digital Screening Bilat with CAD; Future; Expected date: 06/28/2018    Controlled type 2 diabetes mellitus without complication, without long-term current use of insulin  -     Microalbumin/creatinine urine ratio; Future; Expected date: 06/28/2018  -     Lipid panel; Future; Expected date: 06/28/2018  -     Hemoglobin A1c; Future; Expected date: 06/28/2018  -     Comprehensive metabolic panel; Future; Expected date: 06/28/2018  -     CBC auto differential; Future; Expected date: 06/28/2018  -     blood-glucose meter kit; Use as instructed. Please provide glucometer covered under insurance plan.  Dispense: 1 each; Refill: 0  -     blood sugar diagnostic Strp; 1 strip by Misc.(Non-Drug; Combo Route) route once daily.  Dispense: 100 strip; Refill: 11  -     lancing device Misc; 1 Device by Misc.(Non-Drug; Combo Route) route once daily.  Dispense: 1 each; Refill: 0  The current medical regimen is effective;  continue present plan and medications.    Arthralgia of multiple joints  -     meloxicam (MOBIC) 15 MG tablet; Take 1 tablet (15 mg total) by mouth once daily.  Dispense: 90 tablet; Refill: 1  The current medical regimen is effective;  continue present plan and medications.    Essential hypertension  -     metoprolol succinate (TOPROL-XL) 25 MG 24 hr tablet; Take 1 tablet (25 mg total) by mouth once daily.  Dispense: 90 tablet; Refill: 1  Decrease metoprolol dose  The current medical regimen is effective;  continue present plan and medications.    Angina pectoris  -     nitroGLYCERIN (NITROSTAT) 0.4 MG SL tablet; Place 1 tablet (0.4 mg total) under the tongue every 5 (five) minutes as needed for Chest pain.  Dispense: 50 tablet; Refill:  1  Pt wants to have prescription just in case  She has not used this medication recently               Follow-up in about 6 months (around 12/28/2018), or if symptoms worsen or fail to improve.

## 2018-07-12 ENCOUNTER — CLINICAL SUPPORT (OUTPATIENT)
Dept: SMOKING CESSATION | Facility: CLINIC | Age: 78
End: 2018-07-12
Payer: COMMERCIAL

## 2018-07-12 DIAGNOSIS — F17.200 NICOTINE DEPENDENCE: Primary | ICD-10-CM

## 2018-07-12 PROCEDURE — 99407 BEHAV CHNG SMOKING > 10 MIN: CPT | Mod: S$GLB,,,

## 2018-07-18 DIAGNOSIS — M85.80 OSTEOPENIA: Primary | ICD-10-CM

## 2018-07-23 RX ORDER — RAMIPRIL 5 MG/1
CAPSULE ORAL
Qty: 90 CAPSULE | Refills: 0 | Status: SHIPPED | OUTPATIENT
Start: 2018-07-23 | End: 2018-10-19 | Stop reason: SDUPTHER

## 2018-08-03 ENCOUNTER — INFUSION (OUTPATIENT)
Dept: INFUSION THERAPY | Facility: HOSPITAL | Age: 78
End: 2018-08-03
Attending: INTERNAL MEDICINE
Payer: MEDICARE

## 2018-08-03 VITALS — DIASTOLIC BLOOD PRESSURE: 80 MMHG | SYSTOLIC BLOOD PRESSURE: 157 MMHG | RESPIRATION RATE: 16 BRPM | HEART RATE: 87 BPM

## 2018-08-03 DIAGNOSIS — M85.80 OSTEOPENIA: Primary | ICD-10-CM

## 2018-08-03 PROCEDURE — 96372 THER/PROPH/DIAG INJ SC/IM: CPT

## 2018-08-03 PROCEDURE — 63600175 PHARM REV CODE 636 W HCPCS: Mod: JG | Performed by: INTERNAL MEDICINE

## 2018-08-03 RX ADMIN — DENOSUMAB 60 MG: 60 INJECTION SUBCUTANEOUS at 01:08

## 2018-08-03 NOTE — PLAN OF CARE
Problem: Patient Care Overview (Adult)  Goal: Plan of Care Review  Outcome: Outcome(s) achieved Date Met: 08/03/18  Use and side effects of Prolia explained. Pt verbalized understanding. Tolerated injection.

## 2018-08-06 DIAGNOSIS — E11.42 CONTROLLED TYPE 2 DIABETES MELLITUS WITH DIABETIC POLYNEUROPATHY, WITHOUT LONG-TERM CURRENT USE OF INSULIN: ICD-10-CM

## 2018-08-06 RX ORDER — GABAPENTIN 300 MG/1
300 CAPSULE ORAL NIGHTLY
Qty: 30 CAPSULE | Refills: 1 | Status: SHIPPED | OUTPATIENT
Start: 2018-08-06 | End: 2018-10-13 | Stop reason: SDUPTHER

## 2018-08-23 ENCOUNTER — OFFICE VISIT (OUTPATIENT)
Dept: PODIATRY | Facility: CLINIC | Age: 78
End: 2018-08-23
Payer: MEDICARE

## 2018-08-23 VITALS
BODY MASS INDEX: 25.76 KG/M2 | HEIGHT: 62 IN | SYSTOLIC BLOOD PRESSURE: 130 MMHG | WEIGHT: 140 LBS | DIASTOLIC BLOOD PRESSURE: 58 MMHG

## 2018-08-23 DIAGNOSIS — E11.49 TYPE II DIABETES MELLITUS WITH NEUROLOGICAL MANIFESTATIONS: ICD-10-CM

## 2018-08-23 DIAGNOSIS — B35.1 ONYCHOMYCOSIS DUE TO DERMATOPHYTE: Primary | ICD-10-CM

## 2018-08-23 DIAGNOSIS — M20.10 VALGUS DEFORMITY OF GREAT TOE, UNSPECIFIED LATERALITY: ICD-10-CM

## 2018-08-23 DIAGNOSIS — M20.40 HAMMER TOE, UNSPECIFIED LATERALITY: ICD-10-CM

## 2018-08-23 DIAGNOSIS — I73.9 PVD (PERIPHERAL VASCULAR DISEASE): ICD-10-CM

## 2018-08-23 PROCEDURE — 99999 PR PBB SHADOW E&M-EST. PATIENT-LVL III: CPT | Mod: PBBFAC,,, | Performed by: PODIATRIST

## 2018-08-23 PROCEDURE — 99499 UNLISTED E&M SERVICE: CPT | Mod: S$GLB,,, | Performed by: PODIATRIST

## 2018-08-23 PROCEDURE — 11721 DEBRIDE NAIL 6 OR MORE: CPT | Mod: PBBFAC,PO | Performed by: PODIATRIST

## 2018-08-24 NOTE — PROCEDURES
Routine Foot Care  Date/Time: 8/23/2018 2:00 PM  Performed by: Bindu Ferguson DPM  Authorized by: Bindu Ferguson DPM     Consent Done?:  Yes (Verbal)  Hyperkeratotic Skin Lesions?: No      Nail Care Type:  Debride  Location(s): All  (Left 1st Toe, Left 3rd Toe, Left 2nd Toe, Left 4th Toe, Left 5th Toe, Right 1st Toe, Right 2nd Toe, Right 3rd Toe, Right 4th Toe and Right 5th Toe)  Patient tolerance:  Patient tolerated the procedure well with no immediate complications

## 2018-08-24 NOTE — PROGRESS NOTES
Subjective:      Patient ID: Khloe Kendrick is a 78 y.o. female.    Chief Complaint: Diabetes Mellitus (Pcp Dr. Marquis 6/28/18); Diabetic Foot Exam; and Nail Care    Khloe is a 78 y.o. female who presents to the clinic for evaluation and treatment of high risk feet. Khloe has a past medical history of AMD (age-related macular degeneration), bilateral (6/10/2016), Angina pectoris, Cataract, Coronary artery disease, Diabetes mellitus, type 2, Hyperlipidemia, Hypertension, Hyperthyroidism (8/20/2015), Hypothyroidism, Osteoarthritis, Peripheral arterial disease (4/3/2013), Tobacco dependence, Type 2 diabetes mellitus, and Type 2 diabetes with peripheral circulatory disorder, controlled. The patient's chief complaint is painful, long, thick toenails; especially the hallux nails bilaterally which hurt with light touch. This patient has documented high risk feet requiring routine maintenance secondary to diabetes mellitis and those secondary complications of diabetes, as mentioned..      PCP: Aimee Marquis MD    Date Last Seen by PCP:   Chief Complaint   Patient presents with    Diabetes Mellitus     Pcp Dr. Marquis 6/28/18    Diabetic Foot Exam    Nail Care       Current shoe gear:  Worn flat shoe    Hemoglobin A1C   Date Value Ref Range Status   10/13/2017 5.7 (H) 4.0 - 5.6 % Final     Comment:     According to ADA guidelines, hemoglobin A1c <7.0% represents  optimal control in non-pregnant diabetic patients. Different  metrics may apply to specific patient populations.   Standards of Medical Care in Diabetes-2016.  For the purpose of screening for the presence of diabetes:  <5.7%     Consistent with the absence of diabetes  5.7-6.4%  Consistent with increasing risk for diabetes   (prediabetes)  >or=6.5%  Consistent with diabetes  Currently, no consensus exists for use of hemoglobin A1c  for diagnosis of diabetes for children.  This Hemoglobin A1c assay has significant interference with fetal   hemoglobin   (HbF). The  results are invalid for patients with abnormal amounts of   HbF,   including those with known Hereditary Persistence   of Fetal Hemoglobin. Heterozygous hemoglobin variants (HbAS, HbAC,   HbAD, HbAE, HbA2) do not significantly interfere with this assay;   however, presence of multiple variants in a sample may impact the %   interference.     04/21/2017 6.4 (H) 4.5 - 6.2 % Final     Comment:     According to ADA guidelines, hemoglobin A1C <7.0% represents  optimal control in non-pregnant diabetic patients.  Different  metrics may apply to specific populations.   Standards of Medical Care in Diabetes - 2016.  For the purpose of screening for the presence of diabetes:  <5.7%     Consistent with the absence of diabetes  5.7-6.4%  Consistent with increasing risk for diabetes   (prediabetes)  >or=6.5%  Consistent with diabetes  Currently no consensus exists for use of hemoglobin A1C  for diagnosis of diabetes for children.     08/24/2016 6.2 4.5 - 6.2 % Final     Comment:     According to ADA guidelines, hemoglobin A1C <7.0% represents  optimal control in non-pregnant diabetic patients.  Different  metrics may apply to specific populations.   Standards of Medical Care in Diabetes - 2016.  For the purpose of screening for the presence of diabetes:  <5.7%     Consistent with the absence of diabetes  5.7-6.4%  Consistent with increasing risk for diabetes   (prediabetes)  >or=6.5%  Consistent with diabetes  Currently no consensus exists for use of hemoglobin A1C  for diagnosis of diabetes for children.         Chief Complaint   Patient presents with    Diabetes Mellitus     Pcp Dr. Marquis 6/28/18    Diabetic Foot Exam    Nail Care     Patient Active Problem List   Diagnosis    Diabetes mellitus type 2, controlled    Essential hypertension    Combined hyperlipidemia associated with type 2 diabetes mellitus    Osteopenia    Osteoarthritis    Tobacco use    Asymptomatic carotid artery stenosis without infarction     Peripheral arterial disease    Atherosclerosis of aorta    Angina pectoris    Coronary artery disease    Pseudophakia    Senile nuclear sclerosis    Hyperthyroidism    Weight loss    Allergic rhinitis    Nonexudative age-related macular degeneration, bilateral, advanced atrophic with subfoveal involvement    Bilateral posterior capsular opacification    Vitreomacular traction syndrome of left eye     Current Outpatient Medications on File Prior to Visit   Medication Sig Dispense Refill    ALCOHOL PREP PADS PadM       aspirin 81 MG Chew Take 81 mg by mouth once daily.      blood sugar diagnostic Strp 1 strip by Misc.(Non-Drug; Combo Route) route once daily. 100 strip 11    blood-glucose meter kit Use as instructed. Please provide glucometer covered under insurance plan. 1 each 0    calcium carbonate 1250 MG capsule Take 1,250 mg by mouth.      cetirizine (ZYRTEC) 10 MG tablet TAKE 1 TABLET (10 MG TOTAL) BY MOUTH DAILY AS NEEDED FOR ALLERGIES OR RHINITIS.  5    cilostazol (PLETAL) 50 MG Tab TAKE 1 TABLET (50 MG TOTAL) BY MOUTH ONCE DAILY. 90 tablet 0    conjugated estrogens (PREMARIN) vaginal cream Place 0.5 g vaginally twice a week. 1 applicator 1    gabapentin (NEURONTIN) 300 MG capsule TAKE 1 CAPSULE (300 MG TOTAL) BY MOUTH EVERY EVENING. 30 capsule 1    glipiZIDE (GLUCOTROL) 5 MG tablet Take 1 tablet (5 mg total) by mouth 2 (two) times daily with meals. 180 tablet 3    lancets 30 gauge Misc       lancing device Misc 1 Device by Misc.(Non-Drug; Combo Route) route once daily. 1 each 0    levothyroxine (SYNTHROID) 88 MCG tablet Take 1 tablet (88 mcg total) by mouth once daily. 30 tablet 11    meloxicam (MOBIC) 15 MG tablet Take 1 tablet (15 mg total) by mouth once daily. 90 tablet 1    metoprolol succinate (TOPROL-XL) 25 MG 24 hr tablet Take 1 tablet (25 mg total) by mouth once daily. 90 tablet 1    nitroGLYCERIN (NITROSTAT) 0.4 MG SL tablet Place 1 tablet (0.4 mg total) under the tongue  every 5 (five) minutes as needed for Chest pain. 50 tablet 1    ramipril (ALTACE) 5 MG capsule TAKE 1 CAPSULE (5 MG TOTAL) BY MOUTH ONCE DAILY.  1    ramipril (ALTACE) 5 MG capsule TAKE 1 CAPSULE (5 MG TOTAL) BY MOUTH ONCE DAILY. 90 capsule 0    colchicine 0.6 mg tablet Take 2 tablets (1.2 mg total) by mouth once daily. At onset of gout symptoms. May repeat tablet every 12 hours 30 tablet 0     No current facility-administered medications on file prior to visit.      Allergies   Allergen Reactions    Pravastatin Other (See Comments)     Muscle pain     Past Surgical History:   Procedure Laterality Date    ABDOMINAL SURGERY      APPENDECTOMY      CARDIAC SURGERY  2008    CABG    CATARACT EXTRACTION Right     CATARACT EXTRACTION W/  INTRAOCULAR LENS IMPLANT  8/13/2015    Dr. Blount ( [facility])    EYE SURGERY  2011    Rt eye cataract    HERNIA REPAIR      HYSTERECTOMY      HYSTEROTOMY N/A 33 yrs old     Family History   Problem Relation Age of Onset    Diabetes Mother     Coronary artery disease Father     Cataracts Father     Heart disease Brother     Cataracts Sister     No Known Problems Maternal Aunt     No Known Problems Maternal Uncle     No Known Problems Paternal Aunt     No Known Problems Paternal Uncle     No Known Problems Maternal Grandmother     No Known Problems Maternal Grandfather     No Known Problems Paternal Grandmother     No Known Problems Paternal Grandfather     Amblyopia Neg Hx     Blindness Neg Hx     Cancer Neg Hx     Glaucoma Neg Hx     Hypertension Neg Hx     Macular degeneration Neg Hx     Retinal detachment Neg Hx     Strabismus Neg Hx     Stroke Neg Hx     Thyroid disease Neg Hx      Social History     Socioeconomic History    Marital status:      Spouse name: Not on file    Number of children: Not on file    Years of education: Not on file    Highest education level: Not on file   Social Needs    Financial resource strain: Not on file     "Food insecurity - worry: Not on file    Food insecurity - inability: Not on file    Transportation needs - medical: Not on file    Transportation needs - non-medical: Not on file   Occupational History    Not on file   Tobacco Use    Smoking status: Current Every Day Smoker     Packs/day: 1.00    Smokeless tobacco: Current User   Substance and Sexual Activity    Alcohol use: No    Drug use: No    Sexual activity: Not on file   Other Topics Concern    Not on file   Social History Narrative    Not on file       Review of Systems   Constitution: Negative for chills, fever and weakness.   Cardiovascular: Negative for chest pain, claudication and leg swelling.   Respiratory: Negative for cough and shortness of breath.    Hematologic/Lymphatic: Bruises/bleeds easily.   Skin: Positive for dry skin and nail changes. Negative for itching and rash.   Musculoskeletal: Positive for arthritis and joint pain. Negative for back pain, joint swelling and muscle weakness.   Gastrointestinal: Negative for diarrhea, nausea and vomiting.   Neurological: Positive for numbness, paresthesias and sensory change. Negative for tremors.   Psychiatric/Behavioral: Negative for altered mental status and hallucinations.           Objective:       Vitals:    08/23/18 1427   BP: (!) 130/58   Weight: 63.5 kg (140 lb)   Height: 5' 2" (1.575 m)   PainSc: 0-No pain       Physical Exam   Constitutional:   General: Pt. is well-developed, well-nourished, appears stated age, in no acute distress, alert and oriented x 3. No evidence of depression, anxiety, or agitation. Calm, cooperative, and communicative. Appropriate interactions and affect.       Cardiovascular:   Dorsalis pedis and posterior tibial pulses are diminished Bilaterally. Toes are cool to touch. Feet are warm proximally.There is decreased digital hair. Skin is atrophic. Mild edema with mild spider veins b/l.    Musculoskeletal:        Right ankle: She exhibits normal range of motion " and no swelling. No tenderness. Achilles tendon exhibits no pain and no defect.        Left ankle: She exhibits normal range of motion and no swelling. No tenderness. Achilles tendon exhibits no pain and no defect.        Right foot: There is deformity (syndactyly of digits 2-3 ). There is normal range of motion and no tenderness.        Left foot: There is normal range of motion and no tenderness.   Adequate joint range of motion without pain, limitation, nor crepitation Bilateral feet and ankle joints. Muscle strength is 5/5 in all groups bilaterally.    Decreased stride, station of gait.  apropulsive toe off.  Increased angle and base of gait.    Patient has hammertoes of digits 2-5 bilateral partially reducible without symptom today.    Visible and palpable bunion without pain at dorsomedial 1st metatarsal head right and left.  Hallux abducted right and left partially reducible, tracks laterally without being track bound.  No ecchymosis, erythema, edema, or cardinal signs infection or signs of trauma same foot.    Fat pad atrophy to heels and met heads bilateral   Neurological: A sensory deficit is present.   [place] 5.07 monofilamant testing is diminished Juan feet. Sharp/dull sensation diminished Bilaterally. Light touch absent Bilaterally.       Skin: Skin is warm, dry and intact. No abrasion, no ecchymosis, no lesion and no rash noted. She is not diaphoretic. No cyanosis or erythema. No pallor. Nails show no clubbing.   Toenails 1-5 bilaterally are elongated by 2-3 mm, thickened by 2-3 mm, discolored/yellowed, dystrophic, brittle with subungual debris. Tender to distal nail plate pressure of both hallux, without periungual skin abnormality of each.     Interdigital Spaces clean, dry and without evidence of break in skin integrity   Psychiatric: She has a normal mood and affect. Her speech is normal.   Nursing note and vitals reviewed.          Assessment:       Encounter Diagnoses   Name Primary?     Hammer toe, unspecified laterality     Valgus deformity of great toe, unspecified laterality     Type II diabetes mellitus with neurological manifestations     PVD (peripheral vascular disease)     Onychomycosis due to dermatophyte Yes         Plan:       Khloe was seen today for diabetes mellitus, diabetic foot exam and nail care.    Diagnoses and all orders for this visit:    Onychomycosis due to dermatophyte    Hammer toe, unspecified laterality  -     DIABETIC SHOES FOR HOME USE    Valgus deformity of great toe, unspecified laterality  -     DIABETIC SHOES FOR HOME USE    Type II diabetes mellitus with neurological manifestations  -     DIABETIC SHOES FOR HOME USE  -     Routine Foot Care    PVD (peripheral vascular disease)      I counseled the patient on her conditions, their implications and medical management.    - Shoe inspection. Diabetic Foot Education. Patient reminded of the importance of good nutrition and blood sugar control to help prevent podiatric complications of diabetes. Patient instructed on proper foot hygeine. We discussed wearing proper shoe gear, daily foot inspections, never walking without protective shoe gear, caution putting sharp instruments to feet     - Discussed DM foot care:  Wear comfortable, proper fitting shoes. Wash feet daily. Dry well. After drying, apply moisturizer to feet (no lotion to webspaces). Inspect feet daily for skin breaks, blisters, swelling, or redness. Wear cotton socks (preferably white)  Change socks every day. Do NOT walk barefoot. Do NOT use heating pads or warm/hot water soaks     See routine foot care procedure note for nail debridement     Rx diabetic shoes with custom molded inserts to be worn at all times while ambulating. Prescription provided with list of local retailers.     RTC 3-4 months, sooner RAFA Ferguson DPM

## 2018-09-16 DIAGNOSIS — I70.0 ATHEROSCLEROSIS OF AORTA: Chronic | ICD-10-CM

## 2018-09-16 DIAGNOSIS — I73.9 PERIPHERAL ARTERIAL DISEASE: Chronic | ICD-10-CM

## 2018-09-17 RX ORDER — CILOSTAZOL 50 MG/1
50 TABLET ORAL DAILY
Qty: 90 TABLET | Refills: 0 | Status: SHIPPED | OUTPATIENT
Start: 2018-09-17 | End: 2018-12-21 | Stop reason: SDUPTHER

## 2018-10-13 DIAGNOSIS — E11.42 CONTROLLED TYPE 2 DIABETES MELLITUS WITH DIABETIC POLYNEUROPATHY, WITHOUT LONG-TERM CURRENT USE OF INSULIN: ICD-10-CM

## 2018-10-14 RX ORDER — GABAPENTIN 300 MG/1
300 CAPSULE ORAL NIGHTLY
Qty: 30 CAPSULE | Refills: 1 | Status: SHIPPED | OUTPATIENT
Start: 2018-10-14 | End: 2018-11-28 | Stop reason: SDUPTHER

## 2018-10-19 RX ORDER — RAMIPRIL 5 MG/1
CAPSULE ORAL
Qty: 90 CAPSULE | Refills: 0 | Status: SHIPPED | OUTPATIENT
Start: 2018-10-19 | End: 2019-01-15 | Stop reason: SDUPTHER

## 2018-11-08 ENCOUNTER — LAB VISIT (OUTPATIENT)
Dept: LAB | Facility: HOSPITAL | Age: 78
End: 2018-11-08
Attending: INTERNAL MEDICINE
Payer: MEDICARE

## 2018-11-08 DIAGNOSIS — E89.0 POSTABLATIVE HYPOTHYROIDISM: ICD-10-CM

## 2018-11-08 LAB
T3 SERPL-MCNC: 61 NG/DL
T4 FREE SERPL-MCNC: 0.9 NG/DL
TSH SERPL DL<=0.005 MIU/L-ACNC: 10.14 UIU/ML

## 2018-11-08 PROCEDURE — 84443 ASSAY THYROID STIM HORMONE: CPT

## 2018-11-08 PROCEDURE — 36415 COLL VENOUS BLD VENIPUNCTURE: CPT | Mod: PO

## 2018-11-08 PROCEDURE — 84439 ASSAY OF FREE THYROXINE: CPT

## 2018-11-08 PROCEDURE — 84480 ASSAY TRIIODOTHYRONINE (T3): CPT

## 2018-11-13 ENCOUNTER — CLINICAL SUPPORT (OUTPATIENT)
Dept: SMOKING CESSATION | Facility: CLINIC | Age: 78
End: 2018-11-13
Payer: COMMERCIAL

## 2018-11-13 DIAGNOSIS — F17.200 NICOTINE DEPENDENCE: Primary | ICD-10-CM

## 2018-11-13 PROCEDURE — 99407 BEHAV CHNG SMOKING > 10 MIN: CPT | Mod: S$GLB,,, | Performed by: INTERNAL MEDICINE

## 2018-11-13 NOTE — PROGRESS NOTES
"Spoke with patient's daughter(Shakira) today in regard to smoking cessation progress for 3/6 month follow up, she states she is not tobacco free. Patient's daughter states her mother travels from daughter([facility]) to daughter([facility]) and it was hard to make it to the smoking appointments. Daughter states she did consider purchasing a "vape cigarette" because of the smell from the cigarettes, but her mother's PCP stated it was just as bad. Informed patient's daughter of benefit period, a future follow up, and contact information if any further help or support is needed. Will resolve episode and complete smart form for Quit attempt #2 and complete smart form for 3 and 6 month on Quit attempt #3.    "

## 2018-11-28 DIAGNOSIS — E11.42 CONTROLLED TYPE 2 DIABETES MELLITUS WITH DIABETIC POLYNEUROPATHY, WITHOUT LONG-TERM CURRENT USE OF INSULIN: ICD-10-CM

## 2018-11-28 RX ORDER — GABAPENTIN 300 MG/1
300 CAPSULE ORAL NIGHTLY
Qty: 30 CAPSULE | Refills: 1 | Status: SHIPPED | OUTPATIENT
Start: 2018-11-28 | End: 2019-03-23 | Stop reason: SDUPTHER

## 2018-11-30 ENCOUNTER — OFFICE VISIT (OUTPATIENT)
Dept: PODIATRY | Facility: CLINIC | Age: 78
End: 2018-11-30
Payer: MEDICARE

## 2018-11-30 VITALS — WEIGHT: 147 LBS | HEIGHT: 61 IN | BODY MASS INDEX: 27.75 KG/M2

## 2018-11-30 DIAGNOSIS — M20.10 VALGUS DEFORMITY OF GREAT TOE, UNSPECIFIED LATERALITY: ICD-10-CM

## 2018-11-30 DIAGNOSIS — B35.1 ONYCHOMYCOSIS DUE TO DERMATOPHYTE: ICD-10-CM

## 2018-11-30 DIAGNOSIS — M20.40 HAMMER TOE, UNSPECIFIED LATERALITY: Primary | ICD-10-CM

## 2018-11-30 DIAGNOSIS — E11.49 TYPE II DIABETES MELLITUS WITH NEUROLOGICAL MANIFESTATIONS: ICD-10-CM

## 2018-11-30 PROCEDURE — 11721 DEBRIDE NAIL 6 OR MORE: CPT | Mod: Q9,S$GLB,, | Performed by: PODIATRIST

## 2018-11-30 PROCEDURE — 99213 OFFICE O/P EST LOW 20 MIN: CPT | Mod: 25,S$GLB,, | Performed by: PODIATRIST

## 2018-11-30 PROCEDURE — 99999 PR PBB SHADOW E&M-EST. PATIENT-LVL III: CPT | Mod: PBBFAC,,, | Performed by: PODIATRIST

## 2018-11-30 PROCEDURE — 1101F PT FALLS ASSESS-DOCD LE1/YR: CPT | Mod: CPTII,S$GLB,, | Performed by: PODIATRIST

## 2018-12-10 NOTE — PROCEDURES
Routine Foot Care  Date/Time: 11/30/2018 11:30 AM  Performed by: Bindu Ferguson DPM  Authorized by: Bindu Ferguson DPM     Consent Done?:  Yes (Verbal)  Hyperkeratotic Skin Lesions?: No      Nail Care Type:  Debride  Location(s): All  (Left 1st Toe, Left 3rd Toe, Left 2nd Toe, Left 4th Toe, Left 5th Toe, Right 1st Toe, Right 2nd Toe, Right 3rd Toe, Right 4th Toe and Right 5th Toe)  Patient tolerance:  Patient tolerated the procedure well with no immediate complications

## 2018-12-10 NOTE — PROGRESS NOTES
Subjective:      Patient ID: Khloe Kendrick is a 78 y.o. female.    Chief Complaint: Diabetes Mellitus (PCP Dr Marquis); Diabetic Foot Exam; and Nail Care    Khloe is a 78 y.o. female who presents to the clinic for evaluation and treatment of high risk feet. Khloe has a past medical history of AMD (age-related macular degeneration), bilateral (6/10/2016), Angina pectoris, Cataract, Coronary artery disease, Diabetes mellitus, type 2, Hyperlipidemia, Hypertension, Hyperthyroidism (8/20/2015), Hypothyroidism, Osteoarthritis, Peripheral arterial disease (4/3/2013), Tobacco dependence, Type 2 diabetes mellitus, and Type 2 diabetes with peripheral circulatory disorder, controlled. The patient's chief complaint is painful, long, thick toenails; especially the hallux nails bilaterally which hurt with light touch. This patient has documented high risk feet requiring routine maintenance secondary to diabetes mellitis and those secondary complications of diabetes, as mentioned..      PCP: Aimee Marquis MD    Date Last Seen by PCP:   Chief Complaint   Patient presents with    Diabetes Mellitus     PCP Dr Marquis    Diabetic Foot Exam    Nail Care       Current shoe gear:  Worn flat shoe    Hemoglobin A1C   Date Value Ref Range Status   11/08/2018 6.3 (H) 4.0 - 5.6 % Final     Comment:     ADA Screening Guidelines:  5.7-6.4%  Consistent with prediabetes  >or=6.5%  Consistent with diabetes  High levels of fetal hemoglobin interfere with the HbA1C  assay. Heterozygous hemoglobin variants (HbS, HgC, etc)do  not significantly interfere with this assay.   However, presence of multiple variants may affect accuracy.     10/13/2017 5.7 (H) 4.0 - 5.6 % Final     Comment:     According to ADA guidelines, hemoglobin A1c <7.0% represents  optimal control in non-pregnant diabetic patients. Different  metrics may apply to specific patient populations.   Standards of Medical Care in Diabetes-2016.  For the purpose of screening for the presence  of diabetes:  <5.7%     Consistent with the absence of diabetes  5.7-6.4%  Consistent with increasing risk for diabetes   (prediabetes)  >or=6.5%  Consistent with diabetes  Currently, no consensus exists for use of hemoglobin A1c  for diagnosis of diabetes for children.  This Hemoglobin A1c assay has significant interference with fetal   hemoglobin   (HbF). The results are invalid for patients with abnormal amounts of   HbF,   including those with known Hereditary Persistence   of Fetal Hemoglobin. Heterozygous hemoglobin variants (HbAS, HbAC,   HbAD, HbAE, HbA2) do not significantly interfere with this assay;   however, presence of multiple variants in a sample may impact the %   interference.     04/21/2017 6.4 (H) 4.5 - 6.2 % Final     Comment:     According to ADA guidelines, hemoglobin A1C <7.0% represents  optimal control in non-pregnant diabetic patients.  Different  metrics may apply to specific populations.   Standards of Medical Care in Diabetes - 2016.  For the purpose of screening for the presence of diabetes:  <5.7%     Consistent with the absence of diabetes  5.7-6.4%  Consistent with increasing risk for diabetes   (prediabetes)  >or=6.5%  Consistent with diabetes  Currently no consensus exists for use of hemoglobin A1C  for diagnosis of diabetes for children.         Chief Complaint   Patient presents with    Diabetes Mellitus     PCP Dr Marquis    Diabetic Foot Exam    Nail Care     Patient Active Problem List   Diagnosis    Diabetes mellitus type 2, controlled    Essential hypertension    Combined hyperlipidemia associated with type 2 diabetes mellitus    Osteopenia    Osteoarthritis    Tobacco use    Asymptomatic carotid artery stenosis without infarction    Peripheral arterial disease    Atherosclerosis of aorta    Angina pectoris    Coronary artery disease    Pseudophakia    Senile nuclear sclerosis    Hyperthyroidism    Weight loss    Allergic rhinitis    Nonexudative  age-related macular degeneration, bilateral, advanced atrophic with subfoveal involvement    Bilateral posterior capsular opacification    Vitreomacular traction syndrome of left eye     Current Outpatient Medications on File Prior to Visit   Medication Sig Dispense Refill    ALCOHOL PREP PADS PadM       aspirin 81 MG Chew Take 81 mg by mouth once daily.      blood sugar diagnostic Strp 1 strip by Misc.(Non-Drug; Combo Route) route once daily. 100 strip 11    blood-glucose meter kit Use as instructed. Please provide glucometer covered under insurance plan. 1 each 0    calcium carbonate 1250 MG capsule Take 1,250 mg by mouth.      cetirizine (ZYRTEC) 10 MG tablet TAKE 1 TABLET (10 MG TOTAL) BY MOUTH DAILY AS NEEDED FOR ALLERGIES OR RHINITIS.  5    cilostazol (PLETAL) 50 MG Tab TAKE 1 TABLET (50 MG TOTAL) BY MOUTH ONCE DAILY. 90 tablet 0    conjugated estrogens (PREMARIN) vaginal cream Place 0.5 g vaginally twice a week. 1 applicator 1    gabapentin (NEURONTIN) 300 MG capsule Take 1 capsule (300 mg total) by mouth every evening. 30 capsule 1    glipiZIDE (GLUCOTROL) 5 MG tablet Take 1 tablet (5 mg total) by mouth 2 (two) times daily with meals. 180 tablet 3    lancets 30 gauge Misc       lancing device Misc 1 Device by Misc.(Non-Drug; Combo Route) route once daily. 1 each 0    levothyroxine (SYNTHROID) 100 MCG tablet Take 1 tablet (100 mcg total) by mouth once daily. 30 tablet 11    meloxicam (MOBIC) 15 MG tablet Take 1 tablet (15 mg total) by mouth once daily. 90 tablet 1    metoprolol succinate (TOPROL-XL) 25 MG 24 hr tablet Take 1 tablet (25 mg total) by mouth once daily. 90 tablet 1    nitroGLYCERIN (NITROSTAT) 0.4 MG SL tablet Place 1 tablet (0.4 mg total) under the tongue every 5 (five) minutes as needed for Chest pain. 50 tablet 1    ramipril (ALTACE) 5 MG capsule TAKE 1 CAPSULE (5 MG TOTAL) BY MOUTH ONCE DAILY. 90 capsule 0    colchicine 0.6 mg tablet Take 2 tablets (1.2 mg total) by mouth  once daily. At onset of gout symptoms. May repeat tablet every 12 hours 30 tablet 0     No current facility-administered medications on file prior to visit.      Allergies   Allergen Reactions    Pravastatin Other (See Comments)     Muscle pain     Past Surgical History:   Procedure Laterality Date    ABDOMINAL SURGERY      AORTOGRAM WITH RUNOFF RCFA approach N/A 12/8/2014    [facility]    APPENDECTOMY      ARTHROSCOPY-KNEE DEBRIDEMENT Right 4/21/2017    [facility] OR    CARDIAC SURGERY  2008    CABG    CATARACT EXTRACTION Right     CATARACT EXTRACTION W/  INTRAOCULAR LENS IMPLANT  8/13/2015    Dr. Blount ( [facility])    EYE SURGERY  2011    Rt eye cataract    HERNIA REPAIR      HYSTERECTOMY      HYSTEROTOMY N/A 33 yrs old    INCISION AND DRAINAGE-KNEE arthroscopic Right 4/21/2017    [facility] OR    INSERTION-INTRAOCULAR LENS (IOL) Left 8/13/2015    [facility] Bryan [facility]ount [facility] at [facility] OR    PHACOEMULSIFICATION-ASPIRATION-CATARACT Left 8/13/2015    [facility] at [facility] OR    SYNOVECTOMY-KNEE  4/21/2017    [facility] at [facility] OR     Family History   Problem Relation Age of Onset    Diabetes Mother     Coronary artery disease Father     Cataracts Father     Heart disease Brother     Cataracts Sister     No Known Problems Maternal Aunt     No Known Problems Maternal Uncle     No Known Problems Paternal Aunt     No Known Problems Paternal Uncle     No Known Problems Maternal Grandmother     No Known Problems Maternal Grandfather     No Known Problems Paternal Grandmother     No Known Problems Paternal Grandfather     Amblyopia Neg Hx     Blindness Neg Hx     Cancer Neg Hx     Glaucoma Neg Hx     Hypertension Neg Hx     Macular degeneration Neg Hx     Retinal detachment Neg Hx     Strabismus Neg Hx     Stroke Neg Hx     Thyroid  "disease Neg Hx      Social History     Socioeconomic History    Marital status:      Spouse name: Not on file    Number of children: Not on file    Years of education: Not on file    Highest education level: Not on file   Social Needs    Financial resource strain: Not on file    Food insecurity - worry: Not on file    Food insecurity - inability: Not on file    Transportation needs - medical: Not on file    Transportation needs - non-medical: Not on file   Occupational History    Not on file   Tobacco Use    Smoking status: Current Every Day Smoker     Packs/day: 1.00    Smokeless tobacco: Current User   Substance and Sexual Activity    Alcohol use: No    Drug use: No    Sexual activity: Not on file   Other Topics Concern    Not on file   Social History Narrative    Not on file       Review of Systems   Constitution: Negative for chills, fever and weakness.   Cardiovascular: Negative for chest pain, claudication and leg swelling.   Respiratory: Negative for cough and shortness of breath.    Hematologic/Lymphatic: Bruises/bleeds easily.   Skin: Positive for dry skin and nail changes. Negative for itching and rash.   Musculoskeletal: Positive for arthritis and joint pain. Negative for back pain, joint swelling and muscle weakness.   Gastrointestinal: Negative for diarrhea, nausea and vomiting.   Neurological: Positive for numbness, paresthesias and sensory change. Negative for tremors.   Psychiatric/Behavioral: Negative for altered mental status and hallucinations.           Objective:       Vitals:    11/30/18 1140   Weight: 66.7 kg (147 lb)   Height: 5' 1" (1.549 m)   PainSc: 0-No pain       Physical Exam   Constitutional:   General: Pt. is well-developed, well-nourished, appears stated age, in no acute distress, alert and oriented x 3. No evidence of depression, anxiety, or agitation. Calm, cooperative, and communicative. Appropriate interactions and affect.       Cardiovascular:   Dorsalis " pedis and posterior tibial pulses are diminished Bilaterally. Toes are cool to touch. Feet are warm proximally.There is decreased digital hair. Skin is atrophic. Mild edema with mild spider veins b/l.    Musculoskeletal:        Right ankle: She exhibits normal range of motion and no swelling. No tenderness. Achilles tendon exhibits no pain and no defect.        Left ankle: She exhibits normal range of motion and no swelling. No tenderness. Achilles tendon exhibits no pain and no defect.        Right foot: There is deformity (syndactyly of digits 2-3 ). There is normal range of motion and no tenderness.        Left foot: There is normal range of motion and no tenderness.   Adequate joint range of motion without pain, limitation, nor crepitation Bilateral feet and ankle joints. Muscle strength is 5/5 in all groups bilaterally.    Decreased stride, station of gait.  apropulsive toe off.  Increased angle and base of gait.    Patient has hammertoes of digits 2-5 bilateral partially reducible without symptom today.    Visible and palpable bunion without pain at dorsomedial 1st metatarsal head right and left.  Hallux abducted right and left partially reducible, tracks laterally without being track bound.  No ecchymosis, erythema, edema, or cardinal signs infection or signs of trauma same foot.    Fat pad atrophy to heels and met heads bilateral   Neurological: A sensory deficit is present.   [place] 5.07 monofilamant testing is diminished Juan feet. Sharp/dull sensation diminished Bilaterally. Light touch absent Bilaterally.       Skin: Skin is warm, dry and intact. No abrasion, no ecchymosis, no lesion and no rash noted. She is not diaphoretic. No cyanosis or erythema. No pallor. Nails show no clubbing.   Toenails 1-5 bilaterally are elongated by 2-3 mm, thickened by 2-3 mm, discolored/yellowed, dystrophic, brittle with subungual debris. Tender to distal nail plate pressure of both hallux, without periungual skin  abnormality of each.     Interdigital Spaces clean, dry and without evidence of break in skin integrity   Psychiatric: She has a normal mood and affect. Her speech is normal.   Nursing note and vitals reviewed.          Assessment:       Encounter Diagnoses   Name Primary?    Hammer toe, unspecified laterality Yes    Valgus deformity of great toe, unspecified laterality     Type II diabetes mellitus with neurological manifestations     Onychomycosis due to dermatophyte          Plan:       Khloe was seen today for diabetes mellitus, diabetic foot exam and nail care.    Diagnoses and all orders for this visit:    Hammer toe, unspecified laterality  -     DIABETIC SHOES FOR HOME USE  -     Routine Foot Care    Valgus deformity of great toe, unspecified laterality  -     DIABETIC SHOES FOR HOME USE    Type II diabetes mellitus with neurological manifestations  -     DIABETIC SHOES FOR HOME USE  -     Routine Foot Care    Onychomycosis due to dermatophyte  -     Routine Foot Care      I counseled the patient on her conditions, their implications and medical management.    - Shoe inspection. Diabetic Foot Education. Patient reminded of the importance of good nutrition and blood sugar control to help prevent podiatric complications of diabetes. Patient instructed on proper foot hygeine. We discussed wearing proper shoe gear, daily foot inspections, never walking without protective shoe gear, caution putting sharp instruments to feet     - Discussed DM foot care:  Wear comfortable, proper fitting shoes. Wash feet daily. Dry well. After drying, apply moisturizer to feet (no lotion to webspaces). Inspect feet daily for skin breaks, blisters, swelling, or redness. Wear cotton socks (preferably white)  Change socks every day. Do NOT walk barefoot. Do NOT use heating pads or warm/hot water soaks     See routine foot care procedure note for nail debridement     Rx diabetic shoes with custom molded inserts to be worn at all  times while ambulating. Prescription provided with list of local retailers.     RTC 3-4 months, sooner RAFA Ferguson DPM

## 2018-12-21 DIAGNOSIS — I70.0 ATHEROSCLEROSIS OF AORTA: Chronic | ICD-10-CM

## 2018-12-21 DIAGNOSIS — I73.9 PERIPHERAL ARTERIAL DISEASE: Chronic | ICD-10-CM

## 2018-12-21 RX ORDER — CILOSTAZOL 50 MG/1
50 TABLET ORAL DAILY
Qty: 90 TABLET | Refills: 0 | Status: SHIPPED | OUTPATIENT
Start: 2018-12-21 | End: 2019-03-20 | Stop reason: SDUPTHER

## 2019-01-07 DIAGNOSIS — I10 ESSENTIAL HYPERTENSION: Chronic | ICD-10-CM

## 2019-01-07 DIAGNOSIS — M25.50 ARTHRALGIA OF MULTIPLE JOINTS: ICD-10-CM

## 2019-01-08 RX ORDER — MELOXICAM 15 MG/1
15 TABLET ORAL DAILY
Qty: 90 TABLET | Refills: 1 | Status: SHIPPED | OUTPATIENT
Start: 2019-01-08 | End: 2020-01-08

## 2019-01-08 RX ORDER — METOPROLOL SUCCINATE 25 MG/1
25 TABLET, EXTENDED RELEASE ORAL DAILY
Qty: 90 TABLET | Refills: 1 | Status: SHIPPED | OUTPATIENT
Start: 2019-01-08

## 2019-01-09 DIAGNOSIS — M25.50 ARTHRALGIA OF MULTIPLE JOINTS: ICD-10-CM

## 2019-01-09 DIAGNOSIS — I10 ESSENTIAL HYPERTENSION: Chronic | ICD-10-CM

## 2019-01-09 RX ORDER — MELOXICAM 15 MG/1
TABLET ORAL
Qty: 90 TABLET | Refills: 1 | Status: SHIPPED | OUTPATIENT
Start: 2019-01-09 | End: 2019-02-26 | Stop reason: SDUPTHER

## 2019-01-09 RX ORDER — METOPROLOL SUCCINATE 25 MG/1
TABLET, EXTENDED RELEASE ORAL
Qty: 90 TABLET | Refills: 1 | Status: SHIPPED | OUTPATIENT
Start: 2019-01-09 | End: 2019-02-26 | Stop reason: SDUPTHER

## 2019-01-16 RX ORDER — RAMIPRIL 5 MG/1
CAPSULE ORAL
Qty: 90 CAPSULE | Refills: 0 | Status: SHIPPED | OUTPATIENT
Start: 2019-01-16 | End: 2019-05-02 | Stop reason: SDUPTHER

## 2019-02-14 ENCOUNTER — OFFICE VISIT (OUTPATIENT)
Dept: FAMILY MEDICINE | Facility: CLINIC | Age: 79
End: 2019-02-14
Payer: MEDICARE

## 2019-02-14 VITALS
OXYGEN SATURATION: 95 % | TEMPERATURE: 98 F | SYSTOLIC BLOOD PRESSURE: 128 MMHG | DIASTOLIC BLOOD PRESSURE: 64 MMHG | BODY MASS INDEX: 27.53 KG/M2 | HEART RATE: 100 BPM | HEIGHT: 61 IN | WEIGHT: 145.81 LBS

## 2019-02-14 DIAGNOSIS — J20.9 ACUTE BRONCHITIS, UNSPECIFIED ORGANISM: Primary | ICD-10-CM

## 2019-02-14 DIAGNOSIS — Z23 NEED FOR INFLUENZA VACCINATION: ICD-10-CM

## 2019-02-14 DIAGNOSIS — I70.0 ATHEROSCLEROSIS OF AORTA: Chronic | ICD-10-CM

## 2019-02-14 DIAGNOSIS — I20.9 ANGINA PECTORIS: Chronic | ICD-10-CM

## 2019-02-14 DIAGNOSIS — Z72.0 TOBACCO USE: ICD-10-CM

## 2019-02-14 DIAGNOSIS — E11.69 COMBINED HYPERLIPIDEMIA ASSOCIATED WITH TYPE 2 DIABETES MELLITUS: Chronic | ICD-10-CM

## 2019-02-14 DIAGNOSIS — E11.21 CONTROLLED TYPE 2 DIABETES MELLITUS WITH DIABETIC NEPHROPATHY, WITHOUT LONG-TERM CURRENT USE OF INSULIN: Chronic | ICD-10-CM

## 2019-02-14 DIAGNOSIS — E78.2 COMBINED HYPERLIPIDEMIA ASSOCIATED WITH TYPE 2 DIABETES MELLITUS: Chronic | ICD-10-CM

## 2019-02-14 PROCEDURE — 99214 OFFICE O/P EST MOD 30 MIN: CPT | Mod: 25,HCNC,S$GLB, | Performed by: NURSE PRACTITIONER

## 2019-02-14 PROCEDURE — 99999 PR PBB SHADOW E&M-EST. PATIENT-LVL V: ICD-10-PCS | Mod: PBBFAC,HCNC,, | Performed by: NURSE PRACTITIONER

## 2019-02-14 PROCEDURE — 99499 RISK ADDL DX/OHS AUDIT: ICD-10-PCS | Mod: S$PBB,,, | Performed by: NURSE PRACTITIONER

## 2019-02-14 PROCEDURE — 99214 PR OFFICE/OUTPT VISIT, EST, LEVL IV, 30-39 MIN: ICD-10-PCS | Mod: 25,HCNC,S$GLB, | Performed by: NURSE PRACTITIONER

## 2019-02-14 PROCEDURE — 1100F PR PT FALLS ASSESS DOC 2+ FALLS/FALL W/INJURY/YR: ICD-10-PCS | Mod: HCNC,CPTII,S$GLB, | Performed by: NURSE PRACTITIONER

## 2019-02-14 PROCEDURE — G0008 ADMIN INFLUENZA VIRUS VAC: HCPCS | Mod: HCNC,S$GLB,, | Performed by: NURSE PRACTITIONER

## 2019-02-14 PROCEDURE — 99999 PR PBB SHADOW E&M-EST. PATIENT-LVL V: CPT | Mod: PBBFAC,HCNC,, | Performed by: NURSE PRACTITIONER

## 2019-02-14 PROCEDURE — G0008 FLU VACCINE - HIGH DOSE (65+) PRESERVATIVE FREE IM: ICD-10-PCS | Mod: HCNC,S$GLB,, | Performed by: NURSE PRACTITIONER

## 2019-02-14 PROCEDURE — 3288F FALL RISK ASSESSMENT DOCD: CPT | Mod: HCNC,CPTII,S$GLB, | Performed by: NURSE PRACTITIONER

## 2019-02-14 PROCEDURE — 90662 FLU VACCINE - HIGH DOSE (65+) PRESERVATIVE FREE IM: ICD-10-PCS | Mod: HCNC,S$GLB,, | Performed by: NURSE PRACTITIONER

## 2019-02-14 PROCEDURE — 99499 UNLISTED E&M SERVICE: CPT | Mod: S$PBB,,, | Performed by: NURSE PRACTITIONER

## 2019-02-14 PROCEDURE — 90662 IIV NO PRSV INCREASED AG IM: CPT | Mod: HCNC,S$GLB,, | Performed by: NURSE PRACTITIONER

## 2019-02-14 PROCEDURE — 96372 THER/PROPH/DIAG INJ SC/IM: CPT | Mod: 59,HCNC,S$GLB, | Performed by: NURSE PRACTITIONER

## 2019-02-14 PROCEDURE — 3288F PR FALLS RISK ASSESSMENT DOCUMENTED: ICD-10-PCS | Mod: HCNC,CPTII,S$GLB, | Performed by: NURSE PRACTITIONER

## 2019-02-14 PROCEDURE — 96372 PR INJECTION,THERAP/PROPH/DIAG2ST, IM OR SUBCUT: ICD-10-PCS | Mod: 59,HCNC,S$GLB, | Performed by: NURSE PRACTITIONER

## 2019-02-14 PROCEDURE — 3078F DIAST BP <80 MM HG: CPT | Mod: HCNC,CPTII,S$GLB, | Performed by: NURSE PRACTITIONER

## 2019-02-14 PROCEDURE — 3074F PR MOST RECENT SYSTOLIC BLOOD PRESSURE < 130 MM HG: ICD-10-PCS | Mod: HCNC,CPTII,S$GLB, | Performed by: NURSE PRACTITIONER

## 2019-02-14 PROCEDURE — 1100F PTFALLS ASSESS-DOCD GE2>/YR: CPT | Mod: HCNC,CPTII,S$GLB, | Performed by: NURSE PRACTITIONER

## 2019-02-14 PROCEDURE — 3078F PR MOST RECENT DIASTOLIC BLOOD PRESSURE < 80 MM HG: ICD-10-PCS | Mod: HCNC,CPTII,S$GLB, | Performed by: NURSE PRACTITIONER

## 2019-02-14 PROCEDURE — 3074F SYST BP LT 130 MM HG: CPT | Mod: HCNC,CPTII,S$GLB, | Performed by: NURSE PRACTITIONER

## 2019-02-14 RX ORDER — AZITHROMYCIN 250 MG/1
TABLET, FILM COATED ORAL
Qty: 6 TABLET | Refills: 0 | Status: SHIPPED | OUTPATIENT
Start: 2019-02-14 | End: 2019-02-26 | Stop reason: ALTCHOICE

## 2019-02-14 RX ORDER — TRIAMCINOLONE ACETONIDE 40 MG/ML
40 INJECTION, SUSPENSION INTRA-ARTICULAR; INTRAMUSCULAR
Status: COMPLETED | OUTPATIENT
Start: 2019-02-14 | End: 2019-02-14

## 2019-02-14 RX ORDER — METHYLPREDNISOLONE 4 MG/1
TABLET ORAL
Qty: 1 PACKAGE | Refills: 0 | Status: SHIPPED | OUTPATIENT
Start: 2019-02-14 | End: 2019-02-26 | Stop reason: ALTCHOICE

## 2019-02-14 RX ORDER — PROMETHAZINE HYDROCHLORIDE AND DEXTROMETHORPHAN HYDROBROMIDE 6.25; 15 MG/5ML; MG/5ML
5 SYRUP ORAL EVERY 12 HOURS PRN
Qty: 180 ML | Refills: 0 | Status: SHIPPED | OUTPATIENT
Start: 2019-02-14 | End: 2019-02-24

## 2019-02-14 RX ADMIN — TRIAMCINOLONE ACETONIDE 40 MG: 40 INJECTION, SUSPENSION INTRA-ARTICULAR; INTRAMUSCULAR at 03:02

## 2019-02-14 NOTE — PROGRESS NOTES
Subjective:       Patient ID: Khloe Kendrick is a 78 y.o. female.    Chief Complaint: URI (cough,congestionsinus drip X 6 weeks )    URI    This is a chronic problem. The current episode started 1 to 4 weeks ago (about 6 weeks). The problem has been gradually worsening. There has been no fever. Associated symptoms include congestion, coughing, ear pain, headaches, rhinorrhea and a sore throat. Pertinent negatives include no sneezing.       Past Medical History:   Diagnosis Date    AMD (age-related macular degeneration), bilateral 6/10/2016    Angina pectoris     Cataract     Coronary artery disease     Diabetes mellitus, type 2     Hyperlipidemia     Hypertension     Hyperthyroidism 8/20/2015    Hypothyroidism     Osteoarthritis     Peripheral arterial disease 4/3/2013    Tobacco dependence     Type 2 diabetes mellitus     Type 2 diabetes with peripheral circulatory disorder, controlled        Social History     Socioeconomic History    Marital status:      Spouse name: Not on file    Number of children: Not on file    Years of education: Not on file    Highest education level: Not on file   Social Needs    Financial resource strain: Not on file    Food insecurity - worry: Not on file    Food insecurity - inability: Not on file    Transportation needs - medical: Not on file    Transportation needs - non-medical: Not on file   Occupational History    Not on file   Tobacco Use    Smoking status: Current Every Day Smoker     Packs/day: 1.00    Smokeless tobacco: Current User   Substance and Sexual Activity    Alcohol use: No    Drug use: No    Sexual activity: Not on file   Other Topics Concern    Not on file   Social History Narrative    Not on file       Past Surgical History:   Procedure Laterality Date    ABDOMINAL SURGERY      AORTOGRAM WITH RUNOFF RCFA approach N/A 12/8/2014    [facility] CATH LAB    APPENDECTOMY       "ARTHROSCOPY-KNEE DEBRIDEMENT Right 4/21/2017    [facility]    CARDIAC SURGERY  2008    CABG    CATARACT EXTRACTION Right     CATARACT EXTRACTION W/  INTRAOCULAR LENS IMPLANT  8/13/2015    Dr. Blount ( [facility])    EYE SURGERY  2011    Rt eye cataract    HERNIA REPAIR      HYSTERECTOMY      HYSTEROTOMY N/A 33 yrs old    INCISION AND DRAINAGE-KNEE arthroscopic Right 4/21/2017    [facility] OR    INSERTION-INTRAOCULAR LENS (IOL) Left 8/13/2015    [facility] at [facility] OR    PHACOEMULSIFICATION-ASPIRATION-CATARACT Left 8/13/2015    [facility] at [facility] OR    SYNOVECTOMY-KNEE  4/21/2017    [facility] at [facility] OR       Review of Systems   Constitutional: Positive for fever. Negative for chills.   HENT: Positive for congestion, ear pain, postnasal drip, rhinorrhea, sinus pressure and sore throat. Negative for sneezing and trouble swallowing.    Respiratory: Positive for cough.    Neurological: Positive for headaches.   All other systems reviewed and are negative.      Objective:   /64 (BP Location: Right arm, Patient Position: Sitting, BP Method: Medium (Manual))   Pulse 100   Temp 98.1 °F (36.7 °C) (Oral)   Ht 5' 1" (1.549 m)   Wt 66.2 kg (145 lb 13.4 oz)   SpO2 95%   BMI 27.56 kg/m²      Physical Exam   Constitutional: She is oriented to person, place, and time. She appears well-developed and well-nourished. She is cooperative.   HENT:   Head: Normocephalic and atraumatic.   Right Ear: Hearing, tympanic membrane, external ear and ear canal normal.   Left Ear: Hearing, tympanic membrane, external ear and ear canal normal.   Nose: No rhinorrhea.   Mouth/Throat: Posterior oropharyngeal erythema present. No oropharyngeal exudate or posterior oropharyngeal edema.   +PND   Cardiovascular: Normal rate, regular rhythm and normal heart sounds.   Pulmonary/Chest: Effort normal. " No respiratory distress. She has decreased breath sounds in the right middle field, the right lower field, the left middle field and the left lower field. She has no wheezes. She has rhonchi in the right lower field and the left lower field. She has no rales.   Lymphadenopathy:     She has no cervical adenopathy.   Neurological: She is alert and oriented to person, place, and time.   Skin: Skin is warm, dry and intact. She is not diaphoretic. No pallor.   Psychiatric: She has a normal mood and affect. Her speech is normal and behavior is normal.   Vitals reviewed.      Assessment:       1. Acute bronchitis, unspecified organism    2. Need for influenza vaccination    3. Atherosclerosis of aorta    4. Controlled type 2 diabetes mellitus with diabetic nephropathy, without long-term current use of insulin    5. Tobacco use    6. Angina pectoris    7. Combined hyperlipidemia associated with type 2 diabetes mellitus        Plan:       Khloe was seen today for uri.    Diagnoses and all orders for this visit:    Acute bronchitis greater than 10 days, unspecified organism  -     promethazine-dextromethorphan (PROMETHAZINE-DM) 6.25-15 mg/5 mL Syrp; Take 5 mLs by mouth every 12 (twelve) hours as needed.  -     azithromycin (Z-MARYCHUY) 250 MG tablet; Take 2 pills today, then 1 pill every day for the next 4 days  -     methylPREDNISolone (MEDROL DOSEPACK) 4 mg tablet; use as directed  -     triamcinolone acetonide injection 40 mg  -     Increase your water intake to 64-80 oz daily to help thin mucus  -     Nasal Saline spray (Over the counter Karnes spray or Ayr)  2 sprays each nostril 2-3 times a day for nasal congestion  -     Tylenol 500 mg 2 tablets or Ibuprofen 200 mg 2 tablets every 4-6 hours as needed for fever, headaches, sore throat, ear pain, bodyaches, and/or nasal/sinus inflammation  -     Warm salt water gargles with 1/2 cup water and 1 tablespoon salt every 4 hours  -     Warm tea with honey and lemon    Need for  influenza vaccination  -     Influenza - High Dose (65+) (PF) (IM)    Atherosclerosis of aorta    Controlled type 2 diabetes mellitus with diabetic nephropathy, without long-term current use of insulin  -     Diabetic Eye Screening Photo; Future    Tobacco use    Angina pectoris    Combined hyperlipidemia associated with type 2 diabetes mellitus      Problem List Items Addressed This Visit     Tobacco use    Current Assessment & Plan     Encouraged to quit smoking         Diabetes mellitus type 2, controlled (Chronic)    Current Assessment & Plan     Stable and controlled. Continue current treatment plan as previously prescribed with your PCP.   The patient is asked to make an attempt to improve diet and exercise patterns to aid in medical management of this problem.  Followed by PCP           Relevant Orders    Diabetic Eye Screening Photo    Combined hyperlipidemia associated with type 2 diabetes mellitus (Chronic)    Current Assessment & Plan     Stable. Continue to monitor           Atherosclerosis of aorta (Chronic)    Current Assessment & Plan     Stable. Monitor           Angina pectoris (Chronic)    Current Assessment & Plan     Stable             Other Visit Diagnoses     Acute bronchitis, unspecified organism    -  Primary    Need for influenza vaccination        Relevant Orders    Influenza - High Dose (65+) (PF) (IM) (Completed)        Follow-up if symptoms worsen or fail to improve.

## 2019-02-14 NOTE — PATIENT INSTRUCTIONS
Viral Upper Respiratory Illness (Adult)  You have a viral upper respiratory illness (URI), which is another term for the common cold. This illness is contagious during the first few days. It is spread through the air by coughing and sneezing. It may also be spread by direct contact (touching the sick person and then touching your own eyes, nose, or mouth). Frequent handwashing will decrease risk of spread. Most viral illnesses go away within 7 to 10 days with rest and simple home remedies. Sometimes the illness may last for several weeks. Antibiotics will not kill a virus, and they are generally not prescribed for this condition.    Home care  · If symptoms are severe, rest at home for the first 2 to 3 days. When you resume activity, don't let yourself get too tired.  · Avoid being exposed to cigarette smoke (yours or others).  · You may use acetaminophen or ibuprofen to control pain and fever, unless another medicine was prescribed. (Note: If you have chronic liver or kidney disease, have ever had a stomach ulcer or gastrointestinal bleeding, or are taking blood-thinning medicines, talk with your healthcare provider before using these medicines.) Aspirin should never be given to anyone under 18 years of age who is ill with a viral infection or fever. It may cause severe liver or brain damage.  · Your appetite may be poor, so a light diet is fine. Avoid dehydration by drinking 6 to 8 glasses of fluids per day (water, soft drinks, juices, tea, or soup). Extra fluids will help loosen secretions in the nose and lungs.  · Over-the-counter cold medicines will not shorten the length of time youre sick, but they may be helpful for the following symptoms: cough, sore throat, and nasal and sinus congestion. (Note: Do not use decongestants if you have high blood pressure.)  Follow-up care  Follow up with your healthcare provider, or as advised.  When to seek medical advice  Call your healthcare provider right away if any  of these occur:  · Cough with lots of colored sputum (mucus)  · Severe headache; face, neck, or ear pain  · Difficulty swallowing due to throat pain  · Fever of 100.4°F (38°C)  Call 911, or get immediate medical care  Call emergency services right away if any of these occur:  · Chest pain, shortness of breath, wheezing, or difficulty breathing  · Coughing up blood  · Inability to swallow due to throat pain  Date Last Reviewed: 9/13/2015  © 6016-3616 [employer]. [address] [place] [id number]. All rights reserved. This information is not intended as a substitute for professional medical care. Always follow your healthcare professional's instructions.

## 2019-02-19 ENCOUNTER — TELEPHONE (OUTPATIENT)
Dept: FAMILY MEDICINE | Facility: CLINIC | Age: 79
End: 2019-02-19

## 2019-02-19 RX ORDER — BENZONATATE 200 MG/1
200 CAPSULE ORAL 3 TIMES DAILY PRN
Qty: 30 CAPSULE | Refills: 0 | Status: SHIPPED | OUTPATIENT
Start: 2019-02-19 | End: 2019-03-01

## 2019-02-19 NOTE — TELEPHONE ENCOUNTER
Received from pharmacy   Response for patient request  new Rx for Promethazine DM.    Is on backorder,can you change it to something else.    LOV 2/14/19 with CHELO Santillan.

## 2019-02-21 ENCOUNTER — OFFICE VISIT (OUTPATIENT)
Dept: FAMILY MEDICINE | Facility: CLINIC | Age: 79
End: 2019-02-21
Payer: MEDICARE

## 2019-02-21 ENCOUNTER — TELEPHONE (OUTPATIENT)
Dept: FAMILY MEDICINE | Facility: CLINIC | Age: 79
End: 2019-02-21

## 2019-02-21 ENCOUNTER — HOSPITAL ENCOUNTER (OUTPATIENT)
Dept: RADIOLOGY | Facility: HOSPITAL | Age: 79
Discharge: HOME | End: 2019-02-21
Attending: NURSE PRACTITIONER
Payer: MEDICARE

## 2019-02-21 ENCOUNTER — NURSE TRIAGE (OUTPATIENT)
Dept: ADMINISTRATIVE | Facility: CLINIC | Age: 79
End: 2019-02-21

## 2019-02-21 VITALS
SYSTOLIC BLOOD PRESSURE: 130 MMHG | HEIGHT: 61 IN | BODY MASS INDEX: 27.28 KG/M2 | WEIGHT: 144.5 LBS | OXYGEN SATURATION: 95 % | DIASTOLIC BLOOD PRESSURE: 76 MMHG | HEART RATE: 82 BPM | TEMPERATURE: 98 F

## 2019-02-21 DIAGNOSIS — J40 BRONCHITIS: ICD-10-CM

## 2019-02-21 DIAGNOSIS — E11.22 CONTROLLED TYPE 2 DIABETES MELLITUS WITH STAGE 3 CHRONIC KIDNEY DISEASE, WITHOUT LONG-TERM CURRENT USE OF INSULIN: ICD-10-CM

## 2019-02-21 DIAGNOSIS — R05.9 COUGH: ICD-10-CM

## 2019-02-21 DIAGNOSIS — N18.30 CONTROLLED TYPE 2 DIABETES MELLITUS WITH STAGE 3 CHRONIC KIDNEY DISEASE, WITHOUT LONG-TERM CURRENT USE OF INSULIN: ICD-10-CM

## 2019-02-21 DIAGNOSIS — R05.9 COUGH: Primary | ICD-10-CM

## 2019-02-21 PROCEDURE — 94640 AIRWAY INHALATION TREATMENT: CPT | Mod: HCNC,S$GLB,, | Performed by: NURSE PRACTITIONER

## 2019-02-21 PROCEDURE — 99499 RISK ADDL DX/OHS AUDIT: ICD-10-PCS | Mod: S$PBB,,, | Performed by: NURSE PRACTITIONER

## 2019-02-21 PROCEDURE — 3075F SYST BP GE 130 - 139MM HG: CPT | Mod: HCNC,CPTII,S$GLB, | Performed by: NURSE PRACTITIONER

## 2019-02-21 PROCEDURE — 1101F PT FALLS ASSESS-DOCD LE1/YR: CPT | Mod: HCNC,CPTII,S$GLB, | Performed by: NURSE PRACTITIONER

## 2019-02-21 PROCEDURE — 3078F PR MOST RECENT DIASTOLIC BLOOD PRESSURE < 80 MM HG: ICD-10-PCS | Mod: HCNC,CPTII,S$GLB, | Performed by: NURSE PRACTITIONER

## 2019-02-21 PROCEDURE — 3078F DIAST BP <80 MM HG: CPT | Mod: HCNC,CPTII,S$GLB, | Performed by: NURSE PRACTITIONER

## 2019-02-21 PROCEDURE — 99999 PR PBB SHADOW E&M-EST. PATIENT-LVL V: CPT | Mod: PBBFAC,HCNC,, | Performed by: NURSE PRACTITIONER

## 2019-02-21 PROCEDURE — 71046 X-RAY EXAM CHEST 2 VIEWS: CPT | Mod: 26,HCNC,, | Performed by: RADIOLOGY

## 2019-02-21 PROCEDURE — 71046 X-RAY EXAM CHEST 2 VIEWS: CPT | Mod: TC,HCNC,FY,PO

## 2019-02-21 PROCEDURE — 71046 XR CHEST PA AND LATERAL: ICD-10-PCS | Mod: 26,HCNC,, | Performed by: RADIOLOGY

## 2019-02-21 PROCEDURE — 99214 OFFICE O/P EST MOD 30 MIN: CPT | Mod: 25,HCNC,S$GLB, | Performed by: NURSE PRACTITIONER

## 2019-02-21 PROCEDURE — 94640 PR INHAL RX, AIRWAY OBST/DX SPUTUM INDUCT: ICD-10-PCS | Mod: HCNC,S$GLB,, | Performed by: NURSE PRACTITIONER

## 2019-02-21 PROCEDURE — 3075F PR MOST RECENT SYSTOLIC BLOOD PRESS GE 130-139MM HG: ICD-10-PCS | Mod: HCNC,CPTII,S$GLB, | Performed by: NURSE PRACTITIONER

## 2019-02-21 PROCEDURE — 99499 UNLISTED E&M SERVICE: CPT | Mod: S$PBB,,, | Performed by: NURSE PRACTITIONER

## 2019-02-21 PROCEDURE — 1101F PR PT FALLS ASSESS DOC 0-1 FALLS W/OUT INJ PAST YR: ICD-10-PCS | Mod: HCNC,CPTII,S$GLB, | Performed by: NURSE PRACTITIONER

## 2019-02-21 PROCEDURE — 99999 PR PBB SHADOW E&M-EST. PATIENT-LVL V: ICD-10-PCS | Mod: PBBFAC,HCNC,, | Performed by: NURSE PRACTITIONER

## 2019-02-21 PROCEDURE — 99214 PR OFFICE/OUTPT VISIT, EST, LEVL IV, 30-39 MIN: ICD-10-PCS | Mod: 25,HCNC,S$GLB, | Performed by: NURSE PRACTITIONER

## 2019-02-21 RX ORDER — FLUTICASONE PROPIONATE 50 MCG
2 SPRAY, SUSPENSION (ML) NASAL DAILY
Qty: 16 G | Refills: 0 | Status: SHIPPED | OUTPATIENT
Start: 2019-02-21

## 2019-02-21 RX ORDER — IPRATROPIUM BROMIDE AND ALBUTEROL SULFATE 2.5; .5 MG/3ML; MG/3ML
3 SOLUTION RESPIRATORY (INHALATION)
Status: COMPLETED | OUTPATIENT
Start: 2019-02-21 | End: 2019-02-21

## 2019-02-21 RX ORDER — ALBUTEROL SULFATE 90 UG/1
2 AEROSOL, METERED RESPIRATORY (INHALATION) EVERY 6 HOURS PRN
Qty: 18 G | Refills: 1 | Status: SHIPPED | OUTPATIENT
Start: 2019-02-21

## 2019-02-21 RX ADMIN — IPRATROPIUM BROMIDE AND ALBUTEROL SULFATE 3 ML: 2.5; .5 SOLUTION RESPIRATORY (INHALATION) at 10:02

## 2019-02-21 NOTE — PROGRESS NOTES
Subjective:       Patient ID: Khloe Kendrick is a 78 y.o. female.    Chief Complaint: Bronchitis    Cough   This is a recurrent problem. The current episode started 1 to 4 weeks ago. The problem has been gradually worsening. The cough is non-productive. Associated symptoms include postnasal drip. Pertinent negatives include no chills, ear pain, fever, rhinorrhea or sore throat. The symptoms are aggravated by lying down. She has tried oral steroids (antibiotics and steroid injection) for the symptoms. The treatment provided mild relief.       Past Medical History:   Diagnosis Date    AMD (age-related macular degeneration), bilateral 6/10/2016    Angina pectoris     Cataract     Coronary artery disease     Diabetes mellitus, type 2     Hyperlipidemia     Hypertension     Hyperthyroidism 8/20/2015    Hypothyroidism     Osteoarthritis     Peripheral arterial disease 4/3/2013    Tobacco dependence     Type 2 diabetes mellitus     Type 2 diabetes with peripheral circulatory disorder, controlled        Social History     Socioeconomic History    Marital status:      Spouse name: Not on file    Number of children: Not on file    Years of education: Not on file    Highest education level: Not on file   Social Needs    Financial resource strain: Not on file    Food insecurity - worry: Not on file    Food insecurity - inability: Not on file    Transportation needs - medical: Not on file    Transportation needs - non-medical: Not on file   Occupational History    Not on file   Tobacco Use    Smoking status: Current Every Day Smoker     Packs/day: 1.00    Smokeless tobacco: Current User   Substance and Sexual Activity    Alcohol use: No    Drug use: No    Sexual activity: Not on file   Other Topics Concern    Not on file   Social History Narrative    Not on file       Past Surgical History:   Procedure Laterality Date    ABDOMINAL SURGERY      AORTOGRAM WITH RUNOFF RCFA approach N/A  "12/8/2014    Performed by [facility]ICITAS Serr[facility] at [facility] CATH LAB    APPENDECTOMY      ARTHROSCOPY-KNEE DEBRIDEMENT Right 4/21/2017    Performed by [facility] Collins [facility] at [facility] OR    CARDIAC SURGERY  2008    CABG    CATARACT EXTRACTION Right     CATARACT EXTRACTION W/  INTRAOCULAR LENS IMPLANT  8/13/2015    Dr. Blount ( [facility])    EYE SURGERY  2011    Rt eye cataract    HERNIA REPAIR      HYSTERECTOMY      HYSTEROTOMY N/A 33 yrs old    INCISION AND DRAINAGE-KNEE arthroscopic Right 4/21/2017    Performed by [facility] at [facility] OR    INSERTION-INTRAOCULAR LENS (IOL) Left 8/13/2015    Performed by Bryan Blount MD at [facility] OR    PHACOEMULSIFICATION-ASPIRATION-CATARACT Left 8/13/2015    Performed by Bryan Blount MD at [facility] OR    SYNOVECTOMY-KNEE  4/21/2017    Performed by [facility] Collins [facility] at [facility] OR       Review of Systems   Constitutional: Negative for chills and fever.   HENT: Positive for congestion and postnasal drip. Negative for ear pain, rhinorrhea, sinus pressure, sinus pain, sneezing and sore throat.    Respiratory: Positive for cough and chest tightness.    All other systems reviewed and are negative.      Objective:   /76 (BP Location: Right arm, Patient Position: Sitting, BP Method: Medium (Manual))   Pulse 82   Temp 98.4 °F (36.9 °C) (Oral)   Ht 5' 1" (1.549 m)   Wt 65.5 kg (144 lb 8.2 oz)   SpO2 95%   BMI 27.31 kg/m²      Physical Exam   Constitutional: She is oriented to person, place, and time. She appears well-developed and well-nourished. She is cooperative.   HENT:   Head: Normocephalic and atraumatic.   Right Ear: Hearing, tympanic membrane, external ear and ear canal normal.   Left Ear: Hearing, tympanic membrane, external ear and ear canal normal.   Nose: No rhinorrhea.   Mouth/Throat: No oropharyngeal exudate, posterior oropharyngeal edema or posterior oropharyngeal erythema.   +PND   Cardiovascular: Normal rate, regular rhythm and normal heart " sounds.   Pulmonary/Chest: Effort normal. No respiratory distress. She has decreased breath sounds in the right middle field, the right lower field, the left middle field and the left lower field. She has wheezes in the right upper field, the right middle field, the right lower field, the left upper field, the left middle field and the left lower field. She has no rales.   Lymphadenopathy:     She has no cervical adenopathy.   Neurological: She is alert and oriented to person, place, and time.   Skin: Skin is warm, dry and intact. She is not diaphoretic. No pallor.   Psychiatric: She has a normal mood and affect. Her speech is normal and behavior is normal.   Vitals reviewed.      Assessment:       1. Cough    2. Bronchitis    3. Controlled type 2 diabetes mellitus with stage 3 chronic kidney disease, without long-term current use of insulin        Plan:       June was seen today for bronchitis.    Diagnoses and all orders for this visit:    Cough  -     X-Ray Chest PA And Lateral; Future (-) will follow up once results available    Bronchitis        -     albuterol-ipratropium 2.5 mg-0.5 mg/3 mL nebulizer solution 3 mL  -     albuterol (PROAIR HFA) 90 mcg/actuation inhaler; Inhale 2 puffs into the lungs every 6 (six) hours as needed for Wheezing. Rescue  -     fluticasone (FLONASE) 50 mcg/actuation nasal spray; 2 sprays (100 mcg total) by Each Nare route once daily.    Controlled type 2 diabetes mellitus with stage 3 chronic kidney disease, without long-term current use of insulin  -     Stable and controlled. Continue current treatment plan as previously prescribed with your PCP.   -     The patient is asked to make an attempt to improve diet and exercise patterns to aid in medical management of this problem.      Tobacco use disorder       -     Does not want to quit smoking    Follow-up if symptoms worsen or fail to improve.

## 2019-02-21 NOTE — TELEPHONE ENCOUNTER
----- Message from ANGELI Alonso-ZEE sent at 2/21/2019 12:12 PM CST -----  Please inform Ms. Beth White that Ms. Kendrick chest xray is abnormal. She could possibly have a lung mass. A stat CT scan order has been placed. Someone will contact her to schedule the appt. The CT scan will be at [facility].

## 2019-02-21 NOTE — TELEPHONE ENCOUNTER
Reason for Disposition   Caller requesting a NON-URGENT new prescription or refill and triager unable to refill per unit policy    Answer Assessment - Initial Assessment Questions  Pt's cousin , gareth mathew, calling to get test results from x-ray. Also stated the dr had told her after getting xray results she would call in cough med. She was given tessalon perles last week but not helpful. Is requesting another cough med with codeine and not the cough med promethazine dm which was on backorder.    Protocols used: ST MEDICATION QUESTION CALL-A-    Advised caller of results of xrays as indicated in notes today. Advised I would send in request for cough med.

## 2019-02-21 NOTE — PATIENT INSTRUCTIONS
Bronchitis, Viral (Adult)    You have a viral bronchitis. Bronchitis is inflammation and swelling of the lining of the lungs. This is often caused by an infection. Symptoms include a dry, hacking cough that is worse at night. The cough may bring up yellow-green mucus. You may also feel short of breath or wheeze. Other symptoms may include tiredness, chest discomfort, and chills.  Bronchitis that is caused by a virus is not treated with antibiotics. Instead, medicines may be given to help relieve symptoms. Symptoms can last up to 2 weeks, although the cough may last much longer.  This illness is contagious during the first few days and is spread through the air by coughing and sneezing, or by direct contact (touching the sick person and then touching your own eyes, nose, or mouth).  Most viral illnesses resolve within 10 to 14 days with rest and simple home remedies, although they may sometimes last for several weeks.  Home care  · If symptoms are severe, rest at home for the first 2 to 3 days. When you go back to your usual activities, don't let yourself get too tired.  · Do not smoke. Also avoid being exposed to secondhand smoke.  · You may use over-the-counter medicine to control fever or pain, unless another pain medicine was prescribed. (Note: If you have chronic liver or kidney disease or have ever had a stomach ulcer or gastrointestinal bleeding, talk with your healthcare provider before using these medicines. Also talk to your provider if you are taking medicine to prevent blood clots.) Aspirin should never be given to anyone younger than 18 years of age who is ill with a viral infection or fever. It may cause severe liver or brain damage.  · Your appetite may be poor, so a light diet is fine. Avoid dehydration by drinking 6 to 8 glasses of fluids per day (such as water, soft drinks, sports drinks, juices, tea, or soup). Extra fluids will help loosen secretions in the nose and lungs.  · Over-the-counter  cough, cold, and sore-throat medicines will not shorten the length of the illness, but they may help to reduce symptoms. (Note: Do not use decongestants if you have high blood pressure.)  Follow-up care  Follow up with your healthcare provider, or as advised. If you had an X-ray or ECG (electrocardiogram), a specialist will review it. You will be notified of any new findings that may affect your care.  Note: If you are age 65 or older, or if you have a chronic lung disease or condition that affects your immune system, or you smoke, talk to your healthcare provider about having pneumococcal vaccinations and a yearly influenza vaccination (flu shot).  When to seek medical advice  Call your healthcare provider right away if any of these occur:  · Fever of 100.4°F (38°C) or higher  · Coughing up increased amounts of colored sputum  · Weakness, drowsiness, headache, facial pain, ear pain, or a stiff neck  Call 911, or get immediate medical care  Contact emergency services right away if any of these occur:  · Coughing up blood  · Worsening weakness, drowsiness, headache, or stiff neck  · Trouble breathing, wheezing, or pain with breathing  Date Last Reviewed: 9/13/2015  © 6810-6976 [employer]. [address] [place] [address], [place] [address]. All rights reserved. This information is not intended as a substitute for professional medical care. Always follow your healthcare professional's instructions.

## 2019-02-21 NOTE — TELEPHONE ENCOUNTER
Received from pharmacy [facility].  Patient requests new Rx:Promethazine DM is on back order can you change it to something else.

## 2019-02-21 NOTE — TELEPHONE ENCOUNTER
----- Message from Maribeth Lopez sent at 2/21/2019  4:12 PM CST -----  Contact: pt  Type:  Test Results    Who Called: pt's cousin pt  Name of Test (Lab/Mammo/Etc): lab    Date of Test: 2/20/19    Ordering Provider:     Where the test was performed:     Best Call Back Number: [phone]    Additional Information:

## 2019-02-22 ENCOUNTER — HOSPITAL ENCOUNTER (OUTPATIENT)
Dept: RADIOLOGY | Facility: HOSPITAL | Age: 79
Discharge: HOME | End: 2019-02-22
Attending: NURSE PRACTITIONER
Payer: MEDICARE

## 2019-02-22 DIAGNOSIS — R05.9 COUGH: ICD-10-CM

## 2019-02-22 PROCEDURE — 71250 CT THORAX DX C-: CPT | Mod: 26,HCNC,, | Performed by: RADIOLOGY

## 2019-02-22 PROCEDURE — 71250 CT CHEST WITHOUT CONTRAST: ICD-10-PCS | Mod: 26,HCNC,, | Performed by: RADIOLOGY

## 2019-02-22 PROCEDURE — 71250 CT THORAX DX C-: CPT | Mod: TC,HCNC

## 2019-02-25 ENCOUNTER — TELEPHONE (OUTPATIENT)
Dept: FAMILY MEDICINE | Facility: CLINIC | Age: 79
End: 2019-02-25

## 2019-02-25 DIAGNOSIS — C34.90 BRONCHOGENIC CARCINOMA: Primary | ICD-10-CM

## 2019-02-25 NOTE — TELEPHONE ENCOUNTER
"----- Message from Shana Lira sent at 2/25/2019  9:37 AM CST -----  Contact: jacky "daughter" [phone]  Returning a missed call to staff  "

## 2019-02-25 NOTE — TELEPHONE ENCOUNTER
----- Message from ANGELI Alonso-ZEE sent at 2/25/2019  8:49 AM CST -----  Please have patient schedule an appt to come in and discuss CT scan.

## 2019-02-26 ENCOUNTER — OFFICE VISIT (OUTPATIENT)
Dept: FAMILY MEDICINE | Facility: CLINIC | Age: 79
End: 2019-02-26
Payer: MEDICARE

## 2019-02-26 ENCOUNTER — INFUSION (OUTPATIENT)
Dept: INFUSION THERAPY | Facility: HOSPITAL | Age: 79
End: 2019-02-26
Attending: INTERNAL MEDICINE
Payer: MEDICARE

## 2019-02-26 VITALS
TEMPERATURE: 98 F | BODY MASS INDEX: 26.85 KG/M2 | DIASTOLIC BLOOD PRESSURE: 82 MMHG | SYSTOLIC BLOOD PRESSURE: 140 MMHG | OXYGEN SATURATION: 93 % | HEIGHT: 61 IN | HEART RATE: 85 BPM | WEIGHT: 142.19 LBS

## 2019-02-26 DIAGNOSIS — E05.90 HYPERTHYROIDISM: Chronic | ICD-10-CM

## 2019-02-26 DIAGNOSIS — E11.69 COMBINED HYPERLIPIDEMIA ASSOCIATED WITH TYPE 2 DIABETES MELLITUS: Chronic | ICD-10-CM

## 2019-02-26 DIAGNOSIS — Z72.0 TOBACCO USE: ICD-10-CM

## 2019-02-26 DIAGNOSIS — I70.0 ATHEROSCLEROSIS OF AORTA: Chronic | ICD-10-CM

## 2019-02-26 DIAGNOSIS — M85.80 OSTEOPENIA: Primary | ICD-10-CM

## 2019-02-26 DIAGNOSIS — I73.9 PERIPHERAL ARTERIAL DISEASE: Chronic | ICD-10-CM

## 2019-02-26 DIAGNOSIS — R91.8 MASS OF LEFT LUNG: ICD-10-CM

## 2019-02-26 DIAGNOSIS — E78.2 COMBINED HYPERLIPIDEMIA ASSOCIATED WITH TYPE 2 DIABETES MELLITUS: Chronic | ICD-10-CM

## 2019-02-26 DIAGNOSIS — I25.119 CORONARY ARTERY DISEASE INVOLVING NATIVE CORONARY ARTERY OF NATIVE HEART WITH ANGINA PECTORIS: Chronic | ICD-10-CM

## 2019-02-26 DIAGNOSIS — I10 ESSENTIAL HYPERTENSION: Primary | Chronic | ICD-10-CM

## 2019-02-26 DIAGNOSIS — I20.9 ANGINA PECTORIS: Chronic | ICD-10-CM

## 2019-02-26 PROCEDURE — 1101F PT FALLS ASSESS-DOCD LE1/YR: CPT | Mod: HCNC,CPTII,S$GLB, | Performed by: FAMILY MEDICINE

## 2019-02-26 PROCEDURE — 3079F PR MOST RECENT DIASTOLIC BLOOD PRESSURE 80-89 MM HG: ICD-10-PCS | Mod: HCNC,CPTII,S$GLB, | Performed by: FAMILY MEDICINE

## 2019-02-26 PROCEDURE — 99999 PR PBB SHADOW E&M-EST. PATIENT-LVL IV: CPT | Mod: PBBFAC,HCNC,, | Performed by: FAMILY MEDICINE

## 2019-02-26 PROCEDURE — 63600175 PHARM REV CODE 636 W HCPCS: Mod: HCNC,JG | Performed by: INTERNAL MEDICINE

## 2019-02-26 PROCEDURE — 99999 PR PBB SHADOW E&M-EST. PATIENT-LVL IV: ICD-10-PCS | Mod: PBBFAC,HCNC,, | Performed by: FAMILY MEDICINE

## 2019-02-26 PROCEDURE — 3079F DIAST BP 80-89 MM HG: CPT | Mod: HCNC,CPTII,S$GLB, | Performed by: FAMILY MEDICINE

## 2019-02-26 PROCEDURE — 99214 OFFICE O/P EST MOD 30 MIN: CPT | Mod: HCNC,S$GLB,, | Performed by: FAMILY MEDICINE

## 2019-02-26 PROCEDURE — 96372 THER/PROPH/DIAG INJ SC/IM: CPT | Mod: HCNC

## 2019-02-26 PROCEDURE — 1101F PR PT FALLS ASSESS DOC 0-1 FALLS W/OUT INJ PAST YR: ICD-10-PCS | Mod: HCNC,CPTII,S$GLB, | Performed by: FAMILY MEDICINE

## 2019-02-26 PROCEDURE — 99214 PR OFFICE/OUTPT VISIT, EST, LEVL IV, 30-39 MIN: ICD-10-PCS | Mod: HCNC,S$GLB,, | Performed by: FAMILY MEDICINE

## 2019-02-26 PROCEDURE — 3077F PR MOST RECENT SYSTOLIC BLOOD PRESSURE >= 140 MM HG: ICD-10-PCS | Mod: HCNC,CPTII,S$GLB, | Performed by: FAMILY MEDICINE

## 2019-02-26 PROCEDURE — 3077F SYST BP >= 140 MM HG: CPT | Mod: HCNC,CPTII,S$GLB, | Performed by: FAMILY MEDICINE

## 2019-02-26 RX ADMIN — DENOSUMAB 60 MG: 60 INJECTION SUBCUTANEOUS at 10:02

## 2019-02-26 NOTE — PROGRESS NOTES
Routine Office Visit    Patient Name: Khloe Kendrick    : 1940  MRN: [id number]    Subjective:  Khloe is a 78 y.o. female who presents today for     1. Chest CT results - pt is here for discussion of her chest CT result. She was evaluated for a cough that has been present for approximately 3 months. She was initially treated as having bronchitis. Symptoms did not completely resolve. Chest x-ray was done which showed left suprahilar opacity concerning for mass. Chest CT was ordered. Pt is here with daughter to discuss concerns and next steps. Pt continues to smoke and is not ready to quit at this time.     Review of Systems   Constitutional: Negative for chills and fever.   HENT: Negative for congestion.    Eyes: Negative for blurred vision.   Respiratory: Negative for cough.    Cardiovascular: Negative for chest pain.   Gastrointestinal: Negative for abdominal pain, constipation, diarrhea, heartburn, nausea and vomiting.   Genitourinary: Negative for dysuria.   Musculoskeletal: Negative for myalgias.   Skin: Negative for itching and rash.   Neurological: Negative for dizziness and headaches.   Psychiatric/Behavioral: Negative for depression.       Active Problem List  Patient Active Problem List   Diagnosis    Controlled type 2 diabetes mellitus with kidney complication, without long-term current use of insulin    Essential hypertension    Combined hyperlipidemia associated with type 2 diabetes mellitus    Osteopenia    Osteoarthritis    Tobacco use    Asymptomatic carotid artery stenosis without infarction    Peripheral arterial disease    Atherosclerosis of aorta    Angina pectoris    Coronary artery disease    Pseudophakia    Senile nuclear sclerosis    Hyperthyroidism    Weight loss    Allergic rhinitis    Nonexudative age-related macular degeneration, bilateral, advanced atrophic with subfoveal involvement    Bilateral posterior capsular opacification    Vitreomacular traction  syndrome of left eye       Past Surgical History  Past Surgical History:   Procedure Laterality Date    ABDOMINAL SURGERY      AORTOGRAM WITH RUNOFF RCFA approach N/A 12/8/2014    [facility] CATH LAB    APPENDECTOMY      ARTHROSCOPY-KNEE DEBRIDEMENT Right 4/21/2017    [facility] by [facility] OR    CARDIAC SURGERY  2008    CABG    CATARACT EXTRACTION Right     CATARACT EXTRACTION W/  INTRAOCULAR LENS IMPLANT  8/13/2015    Dr. Blount ( [facility])    EYE SURGERY  2011    Rt eye cataract    HERNIA REPAIR      HYSTERECTOMY      HYSTEROTOMY N/A 33 yrs old    INCISION AND DRAINAGE-KNEE arthroscopic Right 4/21/2017    [facility]formed by [facility] at [facility] OR    INSERTION-INTRAOCULAR LENS (IOL) Left 8/13/2015    Performed by Bryan Blount [facility] at [facility] OR    PHACOEMULSIFICATION-ASPIRATION-CATARACT Left 8/13/2015    [facility]formed by [facility] at [facility] OR    SYNOVECTOMY-KNEE  4/21/2017    Performed by [facility] at [facility] OR       Family History  Family History   Problem Relation Age of Onset    Diabetes Mother     Coronary artery disease Father     Cataracts Father     Heart disease Brother     Cataracts Sister     No Known Problems Maternal Aunt     No Known Problems Maternal Uncle     No Known Problems Paternal Aunt     No Known Problems Paternal Uncle     No Known Problems Maternal Grandmother     No Known Problems Maternal Grandfather     No Known Problems Paternal Grandmother     No Known Problems Paternal Grandfather     Amblyopia Neg Hx     Blindness Neg Hx     Cancer Neg Hx     Glaucoma Neg Hx     Hypertension Neg Hx     Macular degeneration Neg Hx     Retinal detachment Neg Hx     Strabismus Neg Hx     Stroke Neg Hx     Thyroid disease Neg Hx        Social History  Social History     Socioeconomic History    Marital status:      Spouse name: Not on file    Number of children: Not on file    Years  of education: Not on file    Highest education level: Not on file   Social Needs    Financial resource strain: Not on file    Food insecurity - worry: Not on file    Food insecurity - inability: Not on file    Transportation needs - medical: Not on file    Transportation needs - non-medical: Not on file   Occupational History    Not on file   Tobacco Use    Smoking status: Current Every Day Smoker     Packs/day: 1.00    Smokeless tobacco: Current User   Substance and Sexual Activity    Alcohol use: No    Drug use: No    Sexual activity: Not on file   Other Topics Concern    Not on file   Social History Narrative    Not on file       Medications and Allergies  Reviewed and updated.   Current Outpatient Medications   Medication Sig    albuterol (PROAIR HFA) 90 mcg/actuation inhaler Inhale 2 puffs into the lungs every 6 (six) hours as needed for Wheezing. Rescue    ALCOHOL PREP PADS PadM     aspirin 81 MG Chew Take 81 mg by mouth once daily.    blood sugar diagnostic Strp 1 strip by Misc.(Non-Drug; Combo Route) route once daily.    blood-glucose meter kit Use as instructed. Please provide glucometer covered under insurance plan.    calcium carbonate 1250 MG capsule Take 1,250 mg by mouth.    cetirizine (ZYRTEC) 10 MG tablet TAKE 1 TABLET (10 MG TOTAL) BY MOUTH DAILY AS NEEDED FOR ALLERGIES OR RHINITIS.    cilostazol (PLETAL) 50 MG Tab TAKE 1 TABLET (50 MG TOTAL) BY MOUTH ONCE DAILY.    gabapentin (NEURONTIN) 300 MG capsule Take 1 capsule (300 mg total) by mouth every evening.    glipiZIDE (GLUCOTROL) 5 MG tablet Take 1 tablet (5 mg total) by mouth 2 (two) times daily with meals.    lancets 30 gauge Misc     lancing device Misc 1 Device by Misc.(Non-Drug; Combo Route) route once daily.    levothyroxine (SYNTHROID) 112 MCG tablet Take 1 tablet (112 mcg total) by mouth once daily.    meloxicam (MOBIC) 15 MG tablet Take 1 tablet (15 mg total) by mouth once daily.    metoprolol succinate  "(TOPROL-XL) 25 MG 24 hr tablet Take 1 tablet (25 mg total) by mouth once daily.    nitroGLYCERIN (NITROSTAT) 0.4 MG SL tablet Place 1 tablet (0.4 mg total) under the tongue every 5 (five) minutes as needed for Chest pain.    ramipril (ALTACE) 5 MG capsule TAKE 1 CAPSULE (5 MG TOTAL) BY MOUTH ONCE DAILY.    vit A/vit C/vit E/zinc/copper (ICAPS AREDS ORAL) Take by mouth.    benzonatate (TESSALON) 200 MG capsule Take 1 capsule (200 mg total) by mouth 3 (three) times daily as needed for Cough.    colchicine 0.6 mg tablet Take 2 tablets (1.2 mg total) by mouth once daily. At onset of gout symptoms. May repeat tablet every 12 hours    fluticasone (FLONASE) 50 mcg/actuation nasal spray 2 sprays (100 mcg total) by Each Nare route once daily.     No current facility-administered medications for this visit.        Physical Exam  BP (!) 140/82 (BP Location: Right arm, Patient Position: Sitting, BP Method: Large (Manual))   Pulse 85   Temp 98.1 °F (36.7 °C) (Oral)   Ht 5' 1" (1.549 m)   Wt 64.5 kg (142 lb 3.2 oz)   SpO2 (!) 93%   BMI 26.87 kg/m²   Physical Exam   Constitutional: She is oriented to person, place, and time. She appears well-developed and well-nourished.   HENT:   Head: Normocephalic and atraumatic.   Eyes: Conjunctivae and EOM are normal. Pupils are equal, round, and reactive to light.   Neck: Normal range of motion. Neck supple.   Cardiovascular: Normal rate, regular rhythm and normal heart sounds. Exam reveals no gallop and no friction rub.   No murmur heard.  Pulmonary/Chest: Breath sounds normal. No respiratory distress.   Abdominal: Soft. Bowel sounds are normal. She exhibits no distension. There is no tenderness.   Musculoskeletal: Normal range of motion.   Lymphadenopathy:     She has no cervical adenopathy.   Neurological: She is alert and oriented to person, place, and time.   Skin: Skin is warm.   Psychiatric: She has a normal mood and affect.         Assessment/Plan:  Khloe Kendrick is " a 78 y.o. female who presents today for :    Problem List Items Addressed This Visit        Cardiac/Vascular    Angina pectoris (Chronic) / Coronary artery disease (Chronic) / Peripheral arterial disease (Chronic)  The current medical regimen is effective;  continue present plan and medications.      Atherosclerosis of aorta (Chronic)   Patient with Atherosclerosis of the Aorta.  Stable/asymptomatic. Currently stable on lipid lowering treatment and b/p monitoring.      Combined hyperlipidemia associated with type 2 diabetes mellitus (Chronic)    The current medical regimen is effective;  continue present plan and medications.      Essential hypertension - Primary (Chronic)  The current medical regimen is effective;  continue present plan and medications.         Endocrine    Hyperthyroidism (Chronic)  The current medical regimen is effective;  continue present plan and medications.         Other    Tobacco use  Recommend to quit smoking  Pt is not ready at this time        Other Visit Diagnoses     Mass of left lung        Relevant Orders    Ambulatory referral to Pulmonology            Follow-up if symptoms worsen or fail to improve.

## 2019-02-26 NOTE — PLAN OF CARE
Problem: Adult Inpatient Plan of Care  Goal: Plan of Care Review  Outcome: Ongoing (interventions implemented as appropriate)  Arrived to unit. No complaints voiced. Taking calcium and Vitamin D at night. Tolerated Prolia. Pt ambulatory, discharged off unit, accompanied by family.

## 2019-02-28 ENCOUNTER — PATIENT MESSAGE (OUTPATIENT)
Dept: FAMILY MEDICINE | Facility: CLINIC | Age: 79
End: 2019-02-28

## 2019-02-28 NOTE — TELEPHONE ENCOUNTER
Spoke with daughter to inform she will be notified by the referral dept, if not by Monday, please call clinic .

## 2019-03-15 ENCOUNTER — OFFICE VISIT (OUTPATIENT)
Dept: PODIATRY | Facility: CLINIC | Age: 79
End: 2019-03-15
Payer: MEDICARE

## 2019-03-15 VITALS — HEIGHT: 61 IN | WEIGHT: 142 LBS | BODY MASS INDEX: 26.81 KG/M2

## 2019-03-15 DIAGNOSIS — M20.10 VALGUS DEFORMITY OF GREAT TOE, UNSPECIFIED LATERALITY: ICD-10-CM

## 2019-03-15 DIAGNOSIS — E11.49 TYPE II DIABETES MELLITUS WITH NEUROLOGICAL MANIFESTATIONS: ICD-10-CM

## 2019-03-15 DIAGNOSIS — B35.1 ONYCHOMYCOSIS DUE TO DERMATOPHYTE: ICD-10-CM

## 2019-03-15 DIAGNOSIS — M20.40 HAMMER TOE, UNSPECIFIED LATERALITY: Primary | ICD-10-CM

## 2019-03-15 PROCEDURE — 11721 PR DEBRIDEMENT OF NAILS, 6 OR MORE: ICD-10-PCS | Mod: Q9,S$GLB,, | Performed by: PODIATRIST

## 2019-03-15 PROCEDURE — 99999 PR PBB SHADOW E&M-EST. PATIENT-LVL IV: CPT | Mod: PBBFAC,,, | Performed by: PODIATRIST

## 2019-03-15 PROCEDURE — 99499 NO LOS: ICD-10-PCS | Mod: S$GLB,,, | Performed by: PODIATRIST

## 2019-03-15 PROCEDURE — 99999 PR PBB SHADOW E&M-EST. PATIENT-LVL IV: ICD-10-PCS | Mod: PBBFAC,,, | Performed by: PODIATRIST

## 2019-03-15 PROCEDURE — 11721 DEBRIDE NAIL 6 OR MORE: CPT | Mod: Q9,S$GLB,, | Performed by: PODIATRIST

## 2019-03-15 PROCEDURE — 99499 UNLISTED E&M SERVICE: CPT | Mod: S$GLB,,, | Performed by: PODIATRIST

## 2019-03-20 DIAGNOSIS — I70.0 ATHEROSCLEROSIS OF AORTA: Chronic | ICD-10-CM

## 2019-03-20 DIAGNOSIS — I73.9 PERIPHERAL ARTERIAL DISEASE: Chronic | ICD-10-CM

## 2019-03-20 RX ORDER — CILOSTAZOL 50 MG/1
TABLET ORAL
Qty: 90 TABLET | Refills: 0 | Status: SHIPPED | OUTPATIENT
Start: 2019-03-20 | End: 2019-06-16 | Stop reason: SDUPTHER

## 2019-03-23 DIAGNOSIS — E11.42 CONTROLLED TYPE 2 DIABETES MELLITUS WITH DIABETIC POLYNEUROPATHY, WITHOUT LONG-TERM CURRENT USE OF INSULIN: ICD-10-CM

## 2019-03-24 RX ORDER — GABAPENTIN 300 MG/1
300 CAPSULE ORAL NIGHTLY
Qty: 90 CAPSULE | Refills: 0 | Status: SHIPPED | OUTPATIENT
Start: 2019-03-24 | End: 2020-03-23

## 2019-03-24 NOTE — PROCEDURES
Routine Foot Care  Date/Time: 3/15/2019 11:30 AM  Performed by: Bindu Ferguson DPM  Authorized by: Bindu Ferguson DPM     Consent Done?:  Yes (Verbal)  Hyperkeratotic Skin Lesions?: No      Nail Care Type:  Debride  Location(s): All  (Left 1st Toe, Left 3rd Toe, Left 2nd Toe, Left 4th Toe, Left 5th Toe, Right 1st Toe, Right 2nd Toe, Right 3rd Toe, Right 4th Toe and Right 5th Toe)  Patient tolerance:  Patient tolerated the procedure well with no immediate complications

## 2019-03-24 NOTE — PROGRESS NOTES
Subjective:      Patient ID: Khloe Kendrick is a 78 y.o. female.    Chief Complaint: Diabetes Mellitus (PCP Dr Marquis); Diabetic Foot Exam; and Nail Problem    Khloe is a 78 y.o. female who presents to the clinic for evaluation and treatment of high risk feet. 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 The patient's chief complaint is painful, long, thick toenails; especially the hallux nails bilaterally which hurt with light touch. This patient has documented high risk feet requiring routine maintenance secondary to diabetes mellitis and those secondary complications of diabetes, as mentioned..      PCP: Aimee Marquis MD    Date Last Seen by PCP:   Chief Complaint   Patient presents with    Diabetes Mellitus     PCP Dr Marquis    Diabetic Foot Exam    Nail Problem       Current shoe gear:  Worn flat shoe    Hemoglobin A1C   Date Value Ref Range Status   11/08/2018 6.3 (H) 4.0 - 5.6 % Final     Comment:     ADA Screening Guidelines:  5.7-6.4%  Consistent with prediabetes  >or=6.5%  Consistent with diabetes  High levels of fetal hemoglobin interfere with the HbA1C  assay. Heterozygous hemoglobin variants (HbS, HgC, etc)do  not significantly interfere with this assay.   However, presence of multiple variants may affect accuracy.     10/13/2017 5.7 (H) 4.0 - 5.6 % Final     Comment:     According to ADA guidelines, hemoglobin A1c <7.0%  represents  optimal control in non-pregnant diabetic patients. Different  metrics may apply to specific patient populations.   Standards of Medical Care in Diabetes-2016.  For the purpose of screening for the presence of diabetes:  <5.7%     Consistent with the absence of diabetes  5.7-6.4%  Consistent with increasing risk for diabetes   (prediabetes)  >or=6.5%  Consistent with diabetes  Currently, no consensus exists for use of hemoglobin A1c  for diagnosis of diabetes for children.  This Hemoglobin A1c assay has significant interference with fetal   hemoglobin   (HbF). The results are invalid for patients with abnormal amounts of   HbF,   including those with known Hereditary Persistence   of Fetal Hemoglobin. Heterozygous hemoglobin variants (HbAS, HbAC,   HbAD, HbAE, HbA2) do not significantly interfere with this assay;   however, presence of multiple variants in a sample may impact the %   interference.     04/21/2017 6.4 (H) 4.5 - 6.2 % Final     Comment:     According to ADA guidelines, hemoglobin A1C <7.0% represents  optimal control in non-pregnant diabetic patients.  Different  metrics may apply to specific populations.   Standards of Medical Care in Diabetes - 2016.  For the purpose of screening for the presence of diabetes:  <5.7%     Consistent with the absence of diabetes  5.7-6.4%  Consistent with increasing risk for diabetes   (prediabetes)  >or=6.5%  Consistent with diabetes  Currently no consensus exists for use of hemoglobin A1C  for diagnosis of diabetes for children.         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, without long-term current use of insulin    Essential hypertension    Combined hyperlipidemia associated with type 2 diabetes mellitus    Osteopenia    Osteoarthritis    Tobacco use     Asymptomatic carotid artery stenosis without infarction    Peripheral arterial disease    Atherosclerosis of aorta    Angina pectoris    Coronary artery disease    Pseudophakia    Senile nuclear sclerosis    Hyperthyroidism    Weight loss    Allergic rhinitis    Nonexudative age-related macular degeneration, bilateral, advanced atrophic with subfoveal involvement    Bilateral posterior capsular opacification    Vitreomacular traction syndrome of left eye     Current Outpatient Medications on File Prior to Visit   Medication Sig Dispense Refill    albuterol (PROAIR HFA) 90 mcg/actuation inhaler Inhale 2 puffs into the lungs every 6 (six) hours as needed for Wheezing. Rescue 18 g 1    ALCOHOL PREP PADS PadM       aspirin 81 MG Chew Take 81 mg by mouth once daily.      blood sugar diagnostic Strp 1 strip by Misc.(Non-Drug; Combo Route) route once daily. 100 strip 11    blood-glucose meter kit Use as instructed. Please provide glucometer covered under insurance plan. 1 each 0    calcium carbonate 1250 MG capsule Take 1,250 mg by mouth.      cetirizine (ZYRTEC) 10 MG tablet TAKE 1 TABLET (10 MG TOTAL) BY MOUTH DAILY AS NEEDED FOR ALLERGIES OR RHINITIS.  5    fluticasone (FLONASE) 50 mcg/actuation nasal spray 2 sprays (100 mcg total) by Each Nare route once daily. 16 g 0    gabapentin (NEURONTIN) 300 MG capsule Take 1 capsule (300 mg total) by mouth every evening. 30 capsule 1    glipiZIDE (GLUCOTROL) 5 MG tablet Take 1 tablet (5 mg total) by mouth 2 (two) times daily with meals. 180 tablet 3    lancets 30 gauge Misc       lancing device Misc 1 Device by Misc.(Non-Drug; Combo Route) route once daily. 1 each 0    levothyroxine (SYNTHROID) 112 MCG tablet Take 1 tablet (112 mcg total) by mouth once daily. 30 tablet 11    meloxicam (MOBIC) 15 MG tablet Take 1 tablet (15 mg total) by mouth once daily. 90 tablet 1    metoprolol succinate (TOPROL-XL) 25 MG 24 hr tablet Take 1 tablet (25 mg total) by  mouth once daily. 90 tablet 1    nitroGLYCERIN (NITROSTAT) 0.4 MG SL tablet Place 1 tablet (0.4 mg total) under the tongue every 5 (five) minutes as needed for Chest pain. 50 tablet 1    ramipril (ALTACE) 5 MG capsule TAKE 1 CAPSULE (5 MG TOTAL) BY MOUTH ONCE DAILY. 90 capsule 0    vit A/vit C/vit E/zinc/copper (ICAPS AREDS ORAL) Take by mouth.      colchicine 0.6 mg tablet Take 2 tablets (1.2 mg total) by mouth once daily. At onset of gout symptoms. May repeat tablet every 12 hours 30 tablet 0     No current facility-administered medications on file prior to visit.      Allergies   Allergen Reactions    Pravastatin Other (See Comments)     Muscle pain     Past Surgical History:   Procedure Laterality Date    ABDOMINAL SURGERY      AORTOGRAM WITH RUNOFF RCFA approach N/A 12/8/2014    Per[facility] CATH LAB    APPENDECTOMY      ARTHROSCOPY-KNEE DEBRIDEMENT Right 4/21/2017    [facility] OR    CARDIAC SURGERY  2008    CABG    CATARACT EXTRACTION Right     CATARACT EXTRACTION W/  INTRAOCULAR LENS IMPLANT  8/13/2015    Dr. Blount ( [facility])    EYE SURGERY  2011    Rt eye cataract    HERNIA REPAIR      HYSTERECTOMY      HYSTEROTOMY N/A 33 yrs old    INCISION AND DRAINAGE-KNEE arthroscopic Right 4/21/2017    [facility] at [facility] OR    INSERTION-INTRAOCULAR LENS (IOL) Left 8/13/2015    Performed by Bryan Blount [facility] at [facility] OR    PHACOEMULSIFICATION-ASPIRATION-CATARACT Left 8/13/2015    [facility]formed by [facility] at [facility] OR    SYNOVECTOMY-KNEE  4/21/2017    Performed by [facility] at [facility] OR     Family History   Problem Relation Age of Onset    Diabetes Mother     Coronary artery disease Father     Cataracts Father     Heart disease Brother     Cataracts Sister     No Known Problems Maternal Aunt     No Known Problems Maternal Uncle     No Known Problems Paternal Aunt     No Known  Problems Paternal Uncle     No Known Problems Maternal Grandmother     No Known Problems Maternal Grandfather     No Known Problems Paternal Grandmother     No Known Problems Paternal Grandfather     Amblyopia Neg Hx     Blindness Neg Hx     Cancer Neg Hx     Glaucoma Neg Hx     Hypertension Neg Hx     Macular degeneration Neg Hx     Retinal detachment Neg Hx     Strabismus Neg Hx     Stroke Neg Hx     Thyroid disease Neg Hx      Social History     Socioeconomic History    Marital status:      Spouse name: Not on file    Number of children: Not on file    Years of education: Not on file    Highest education level: Not on file   Occupational History    Not on file   Social Needs    Financial resource strain: Not on file    Food insecurity:     Worry: Not on file     Inability: Not on file    Transportation needs:     Medical: Not on file     Non-medical: Not on file   Tobacco Use    Smoking status: Current Every Day Smoker     Packs/day: 1.00    Smokeless tobacco: Current User   Substance and Sexual Activity    Alcohol use: No    Drug use: No    Sexual activity: Not on file   Lifestyle    Physical activity:     Days per week: Not on file     Minutes per session: Not on file    Stress: Not on file   Relationships    Social connections:     Talks on phone: Not on file     Gets together: Not on file     Attends Sabianist service: Not on file     Active member of club or organization: Not on file     Attends meetings of clubs or organizations: Not on file     Relationship status: Not on file    Intimate partner violence:     Fear of current or ex partner: Not on file     Emotionally abused: Not on file     Physically abused: Not on file     Forced sexual activity: Not on file   Other Topics Concern    Not on file   Social History Narrative    Not on file       Review of Systems   Constitution: Negative for chills and fever.   Cardiovascular: Negative for chest pain, claudication  "and leg swelling.   Respiratory: Negative for cough and shortness of breath.    Hematologic/Lymphatic: Bruises/bleeds easily.   Skin: Positive for dry skin and nail changes. Negative for itching and rash.   Musculoskeletal: Positive for arthritis and joint pain. Negative for back pain, joint swelling and muscle weakness.   Gastrointestinal: Negative for diarrhea, nausea and vomiting.   Neurological: Positive for numbness, paresthesias and sensory change. Negative for tremors and weakness.   Psychiatric/Behavioral: Negative for altered mental status and hallucinations.           Objective:       Vitals:    03/15/19 1104   Weight: 64.4 kg (142 lb)   Height: 5' 1" (1.549 m)   PainSc: 0-No pain       Physical Exam   Constitutional:   General: Pt. is well-developed, well-nourished, appears stated age, in no acute distress, alert and oriented x 3. No evidence of depression, anxiety, or agitation. Calm, cooperative, and communicative. Appropriate interactions and affect.       Cardiovascular:   Dorsalis pedis and posterior tibial pulses are diminished Bilaterally. Toes are cool to touch. Feet are warm proximally.There is decreased digital hair. Skin is atrophic. Mild edema with mild spider veins b/l.    Musculoskeletal:        Right ankle: She exhibits normal range of motion and no swelling. No tenderness. Achilles tendon exhibits no pain and no defect.        Left ankle: She exhibits normal range of motion and no swelling. No tenderness. Achilles tendon exhibits no pain and no defect.        Right foot: There is deformity (syndactyly of digits 2-3 ). There is normal range of motion and no tenderness.        Left foot: There is normal range of motion and no tenderness.   Adequate joint range of motion without pain, limitation, nor crepitation Bilateral feet and ankle joints. Muscle strength is 5/5 in all groups bilaterally.    Decreased stride, station of gait.  apropulsive toe off.  Increased angle and base of " gait.    Patient has hammertoes of digits 2-5 bilateral partially reducible without symptom today.    Visible and palpable bunion without pain at dorsomedial 1st metatarsal head right and left.  Hallux abducted right and left partially reducible, tracks laterally without being track bound.  No ecchymosis, erythema, edema, or cardinal signs infection or signs of trauma same foot.    Fat pad atrophy to heels and met heads bilateral   Neurological: A sensory deficit is present.   [place] 5.07 monofilamant testing is diminished Juan feet. Sharp/dull sensation diminished Bilaterally. Light touch absent Bilaterally.       Skin: Skin is warm, dry and intact. No abrasion, no ecchymosis, no lesion and no rash noted. She is not diaphoretic. No cyanosis or erythema. No pallor. Nails show no clubbing.   Toenails 1-5 bilaterally are elongated by 2-3 mm, thickened by 2-3 mm, discolored/yellowed, dystrophic, brittle with subungual debris. Tender to distal nail plate pressure of both hallux, without periungual skin abnormality of each.     Interdigital Spaces clean, dry and without evidence of break in skin integrity   Psychiatric: She has a normal mood and affect. Her speech is normal.   Nursing note and vitals reviewed.          Assessment:       Encounter Diagnoses   Name Primary?    Hammer toe, unspecified laterality Yes    Valgus deformity of great toe, unspecified laterality     Type II diabetes mellitus with neurological manifestations     Onychomycosis due to dermatophyte          Plan:       Khloe was seen today for diabetes mellitus, diabetic foot exam and nail problem.    Diagnoses and all orders for this visit:    Hammer toe, unspecified laterality  -     DIABETIC SHOES FOR HOME USE    Valgus deformity of great toe, unspecified laterality  -     DIABETIC SHOES FOR HOME USE    Type II diabetes mellitus with neurological manifestations  -     DIABETIC SHOES FOR HOME USE    Onychomycosis due to  dermatophyte      I counseled the patient on her conditions, their implications and medical management.    - Shoe inspection. Diabetic Foot Education. Patient reminded of the importance of good nutrition and blood sugar control to help prevent podiatric complications of diabetes. Patient instructed on proper foot hygeine. We discussed wearing proper shoe gear, daily foot inspections, never walking without protective shoe gear, caution putting sharp instruments to feet     - Discussed DM foot care:  Wear comfortable, proper fitting shoes. Wash feet daily. Dry well. After drying, apply moisturizer to feet (no lotion to webspaces). Inspect feet daily for skin breaks, blisters, swelling, or redness. Wear cotton socks (preferably white)  Change socks every day. Do NOT walk barefoot. Do NOT use heating pads or warm/hot water soaks     See routine foot care procedure note for nail debridement     Rx diabetic shoes with custom molded inserts to be worn at all times while ambulating. Prescription provided with list of local retailers.     RTC 3-4 months, sooner RAFA Ferguson DPM

## 2019-03-25 RX ORDER — FLUTICASONE PROPIONATE 50 MCG
SPRAY, SUSPENSION (ML) NASAL
Qty: 16 ML | Refills: 0 | OUTPATIENT
Start: 2019-03-25

## 2019-03-28 ENCOUNTER — HOSPITAL ENCOUNTER (OUTPATIENT)
Dept: PULMONOLOGY | Facility: CLINIC | Age: 79
Discharge: HOME | End: 2019-03-28
Payer: MEDICARE

## 2019-03-28 ENCOUNTER — OFFICE VISIT (OUTPATIENT)
Dept: PULMONOLOGY | Facility: CLINIC | Age: 79
End: 2019-03-28
Payer: MEDICARE

## 2019-03-28 VITALS — BODY MASS INDEX: 26.43 KG/M2 | WEIGHT: 140 LBS | HEIGHT: 61 IN

## 2019-03-28 VITALS
OXYGEN SATURATION: 91 % | SYSTOLIC BLOOD PRESSURE: 124 MMHG | HEART RATE: 85 BPM | WEIGHT: 140 LBS | BODY MASS INDEX: 26.43 KG/M2 | DIASTOLIC BLOOD PRESSURE: 68 MMHG | HEIGHT: 61 IN

## 2019-03-28 DIAGNOSIS — J43.2 CENTRILOBULAR EMPHYSEMA: ICD-10-CM

## 2019-03-28 DIAGNOSIS — R91.8 LUNG MASS: Primary | ICD-10-CM

## 2019-03-28 DIAGNOSIS — R91.8 LUNG MASS: ICD-10-CM

## 2019-03-28 PROCEDURE — 3078F DIAST BP <80 MM HG: CPT | Mod: HCNC,CPTII,S$GLB, | Performed by: INTERNAL MEDICINE

## 2019-03-28 PROCEDURE — 3078F PR MOST RECENT DIASTOLIC BLOOD PRESSURE < 80 MM HG: ICD-10-PCS | Mod: HCNC,CPTII,S$GLB, | Performed by: INTERNAL MEDICINE

## 2019-03-28 PROCEDURE — 1101F PR PT FALLS ASSESS DOC 0-1 FALLS W/OUT INJ PAST YR: ICD-10-PCS | Mod: HCNC,CPTII,S$GLB, | Performed by: INTERNAL MEDICINE

## 2019-03-28 PROCEDURE — 99204 PR OFFICE/OUTPT VISIT, NEW, LEVL IV, 45-59 MIN: ICD-10-PCS | Mod: 25,HCNC,S$GLB, | Performed by: INTERNAL MEDICINE

## 2019-03-28 PROCEDURE — 94618 PULMONARY STRESS TESTING: ICD-10-PCS | Mod: HCNC,S$GLB,, | Performed by: INTERNAL MEDICINE

## 2019-03-28 PROCEDURE — 1101F PT FALLS ASSESS-DOCD LE1/YR: CPT | Mod: HCNC,CPTII,S$GLB, | Performed by: INTERNAL MEDICINE

## 2019-03-28 PROCEDURE — 99999 PR PBB SHADOW E&M-EST. PATIENT-LVL III: ICD-10-PCS | Mod: PBBFAC,HCNC,, | Performed by: INTERNAL MEDICINE

## 2019-03-28 PROCEDURE — 99999 PR PBB SHADOW E&M-EST. PATIENT-LVL III: CPT | Mod: PBBFAC,HCNC,, | Performed by: INTERNAL MEDICINE

## 2019-03-28 PROCEDURE — 99499 RISK ADDL DX/OHS AUDIT: ICD-10-PCS | Mod: S$PBB,,, | Performed by: INTERNAL MEDICINE

## 2019-03-28 PROCEDURE — 3074F SYST BP LT 130 MM HG: CPT | Mod: HCNC,CPTII,S$GLB, | Performed by: INTERNAL MEDICINE

## 2019-03-28 PROCEDURE — 99204 OFFICE O/P NEW MOD 45 MIN: CPT | Mod: 25,HCNC,S$GLB, | Performed by: INTERNAL MEDICINE

## 2019-03-28 PROCEDURE — 99499 UNLISTED E&M SERVICE: CPT | Mod: S$PBB,,, | Performed by: INTERNAL MEDICINE

## 2019-03-28 PROCEDURE — 94618 PULMONARY STRESS TESTING: CPT | Mod: HCNC,S$GLB,, | Performed by: INTERNAL MEDICINE

## 2019-03-28 PROCEDURE — 3074F PR MOST RECENT SYSTOLIC BLOOD PRESSURE < 130 MM HG: ICD-10-PCS | Mod: HCNC,CPTII,S$GLB, | Performed by: INTERNAL MEDICINE

## 2019-03-28 NOTE — LETTER
March 28, 2019      Selina Juarez, FNP-C  [address]  [place] [address]           [facility]  [address]  [place] [address]  Phone: [phone]          Patient: Khloe Kendrick   MR Number: [id number]   YOB: 1940   Date of Visit: 3/28/2019       Dear Selina Juarez:    Thank you for referring Khloe Kendrick to me for evaluation. Attached you will find relevant portions of my assessment and plan of care.    If you have questions, please do not hesitate to call me. I look forward to following Khloe Kendrick along with you.    Sincerely,    Belen Cid MD    Enclosure  CC:  No Recipients    If you would like to receive this communication electronically, please contact [email or web address] or [phone] to request more information on [facility] access.    For providers and/or their staff who would like to refer a patient to [facility], please contact us through our one-stop-shop provider referral line, [facility], at [phone].    If you feel you have received this communication in error or would no longer like to receive these types of communications, please e-mail [email or web address]         
SHORTNESS OF BREATH/COUGH/DIFFICULTY BREATHING/CHILLS/DYSPNEA ON EXERTION

## 2019-03-28 NOTE — PROGRESS NOTES
"Subjective:       Patient ID: Khloe Kendrick is a 78 y.o. female.    Chief Complaint: Cancer    78 year old current smoker with a history of chronic cough. Had a change in cough and treated for bronchitis.  CT with large left hilar mass.  For the past month has worsening GIL and cough productive of green sputum.  Poor appetite.  No fever or chills.    Cough and dyspnea has improved when using albuterol  Review of Systems   Constitutional: Negative for weight loss and weight gain.   HENT: Negative for trouble swallowing.    Eyes: Negative for redness and itching.   Respiratory: Negative for hemoptysis.    Cardiovascular: Negative for chest pain and leg swelling.   Genitourinary: Negative for difficulty urinating.   Endocrine: Negative for cold intolerance and heat intolerance.    Musculoskeletal: Negative for arthralgias.   Skin: Negative for rash.   Gastrointestinal: Negative for acid reflux.   Neurological: Negative for headaches.   Hematological: Negative for adenopathy.   Psychiatric/Behavioral: Negative for confusion.       Past medical and surgical history reviewed.  Social and family history reviewed.  Allergies and medications reviewed.    Objective:       Vitals:    03/28/19 0944   BP: 124/68   BP Location: Right arm   Patient Position: Sitting   Pulse: 85   SpO2: (!) 91%   Weight: 63.5 kg (139 lb 15.9 oz)   Height: 5' 1" (1.549 m)     Physical Exam   Constitutional: She is oriented to person, place, and time. She appears well-developed and well-nourished.   HENT:   Head: Normocephalic.   Neck: Normal range of motion. Neck supple. No tracheal deviation present. No thyromegaly present.   Cardiovascular: Normal rate, regular rhythm, normal heart sounds and intact distal pulses.   Pulmonary/Chest: Normal expansion and hyperinflation. She has wheezes.   Abdominal: Soft. Bowel sounds are normal. There is no hepatosplenomegaly.   Musculoskeletal: She exhibits no edema.   Lymphadenopathy: No supraclavicular " adenopathy is present.     She has no cervical adenopathy.   Neurological: She is alert and oriented to person, place, and time.   Skin: Skin is warm.   Psychiatric: She has a normal mood and affect. Her behavior is normal.     Personal Diagnostic Review  CT of chest performed on 2/22/2019 without contrast revealed large right paratracheal and subcarinal lymph nodes.  With narrowing of the left mainstem bronchus.  No flowsheet data found.      Assessment:       1. Lung mass    2. Centrilobular emphysema        Outpatient Encounter Medications as of 3/28/2019   Medication Sig Dispense Refill    albuterol (PROAIR HFA) 90 mcg/actuation inhaler Inhale 2 puffs into the lungs every 6 (six) hours as needed for Wheezing. Rescue 18 g 1    ALCOHOL PREP PADS PadM       aspirin 81 MG Chew Take 81 mg by mouth once daily.      blood sugar diagnostic Strp 1 strip by Misc.(Non-Drug; Combo Route) route once daily. 100 strip 11    blood-glucose meter kit Use as instructed. Please provide glucometer covered under insurance plan. 1 each 0    calcium carbonate 1250 MG capsule Take 1,250 mg by mouth.      cetirizine (ZYRTEC) 10 MG tablet TAKE 1 TABLET (10 MG TOTAL) BY MOUTH DAILY AS NEEDED FOR ALLERGIES OR RHINITIS.  5    cilostazol (PLETAL) 50 MG Tab TAKE 1 TABLET BY MOUTH EVERY DAY 90 tablet 0    fluticasone (FLONASE) 50 mcg/actuation nasal spray 2 sprays (100 mcg total) by Each Nare route once daily. 16 g 0    gabapentin (NEURONTIN) 300 MG capsule TAKE 1 CAPSULE (300 MG TOTAL) BY MOUTH EVERY EVENING. 90 capsule 0    glipiZIDE (GLUCOTROL) 5 MG tablet Take 1 tablet (5 mg total) by mouth 2 (two) times daily with meals. 180 tablet 3    lancets 30 gauge Misc       lancing device Misc 1 Device by Misc.(Non-Drug; Combo Route) route once daily. 1 each 0    levothyroxine (SYNTHROID) 112 MCG tablet Take 1 tablet (112 mcg total) by mouth once daily. 30 tablet 11    meloxicam (MOBIC) 15 MG tablet Take 1 tablet (15 mg total) by  mouth once daily. 90 tablet 1    metoprolol succinate (TOPROL-XL) 25 MG 24 hr tablet Take 1 tablet (25 mg total) by mouth once daily. 90 tablet 1    nitroGLYCERIN (NITROSTAT) 0.4 MG SL tablet Place 1 tablet (0.4 mg total) under the tongue every 5 (five) minutes as needed for Chest pain. 50 tablet 1    ramipril (ALTACE) 5 MG capsule TAKE 1 CAPSULE (5 MG TOTAL) BY MOUTH ONCE DAILY. 90 capsule 0    vit A/vit C/vit E/zinc/copper (ICAPS AREDS ORAL) Take by mouth.      colchicine 0.6 mg tablet Take 2 tablets (1.2 mg total) by mouth once daily. At onset of gout symptoms. May repeat tablet every 12 hours 30 tablet 0    umeclidinium-vilanterol (ANORO ELLIPTA) 62.5-25 mcg/actuation DsDv Inhale 1 puff into the lungs once daily. Controller 1 each 5     No facility-administered encounter medications on file as of 3/28/2019.      Orders Placed This Encounter   Procedures    Six Minute Walk Test to qualify for Home Oxygen     Standing Status:   Future     Number of Occurrences:   1     Standing Expiration Date:   3/28/2020    Case Request Operating Room: ENDOBRONCHIAL ULTRASOUND (EBUS)     Order Specific Question:   Medical Necessity:     Answer:   Medically Urgent [101]     Order Specific Question:   CPT Code:     Answer:   AL BRONCH W/ EBUS, SAMPLING 3 OR MORE NODES, INCL GUIDE [78735]     Order Specific Question:   CPT Code:     Answer:   AL BRONCHOSCOPY,TRANSBRON ASPIR BX [08333]     Order Specific Question:   Post-Procedure Disposition:     Answer:   Amb Surgery/DOSC [2]     Plan:     Problem List Items Addressed This Visit     Lung mass - Primary    Overview     Diagnostic bronchoscopy with EBUS scheduled.  I have explained the risks, benefits and alternatives of the procedure in detail.  The patient voices understanding and all questions have been answered.  The patient agrees to proceed as planned.         Relevant Orders    Six Minute Walk Test to qualify for Home Oxygen (Completed)    Case Request Operating Room:  ENDOBRONCHIAL ULTRASOUND (EBUS) (Completed)    Centrilobular emphysema    Relevant Medications    umeclidinium-vilanterol (ANORO ELLIPTA) 62.5-25 mcg/actuation DsDv    Other Relevant Orders    Six Minute Walk Test to qualify for Home Oxygen (Completed)

## 2019-03-28 NOTE — H&P (VIEW-ONLY)
"Subjective:       Patient ID: Khloe Kendrcik is a 78 y.o. female.    Chief Complaint: Cancer    78 year old current smoker with a history of chronic cough. Had a change in cough and treated for bronchitis.  CT with large left hilar mass.  For the past month has worsening GIL and cough productive of green sputum.  Poor appetite.  No fever or chills.    Cough and dyspnea has improved when using albuterol  Review of Systems   Constitutional: Negative for weight loss and weight gain.   HENT: Negative for trouble swallowing.    Eyes: Negative for redness and itching.   Respiratory: Negative for hemoptysis.    Cardiovascular: Negative for chest pain and leg swelling.   Genitourinary: Negative for difficulty urinating.   Endocrine: Negative for cold intolerance and heat intolerance.    Musculoskeletal: Negative for arthralgias.   Skin: Negative for rash.   Gastrointestinal: Negative for acid reflux.   Neurological: Negative for headaches.   Hematological: Negative for adenopathy.   Psychiatric/Behavioral: Negative for confusion.       Past medical and surgical history reviewed.  Social and family history reviewed.  Allergies and medications reviewed.    Objective:       Vitals:    03/28/19 0944   BP: 124/68   BP Location: Right arm   Patient Position: Sitting   Pulse: 85   SpO2: (!) 91%   Weight: 63.5 kg (139 lb 15.9 oz)   Height: 5' 1" (1.549 m)     Physical Exam   Constitutional: She is oriented to person, place, and time. She appears well-developed and well-nourished.   HENT:   Head: Normocephalic.   Neck: Normal range of motion. Neck supple. No tracheal deviation present. No thyromegaly present.   Cardiovascular: Normal rate, regular rhythm, normal heart sounds and intact distal pulses.   Pulmonary/Chest: Normal expansion and hyperinflation. She has wheezes.   Abdominal: Soft. Bowel sounds are normal. There is no hepatosplenomegaly.   Musculoskeletal: She exhibits no edema.   Lymphadenopathy: No supraclavicular " adenopathy is present.     She has no cervical adenopathy.   Neurological: She is alert and oriented to person, place, and time.   Skin: Skin is warm.   Psychiatric: She has a normal mood and affect. Her behavior is normal.     Personal Diagnostic Review  CT of chest performed on 2/22/2019 without contrast revealed large right paratracheal and subcarinal lymph nodes.  With narrowing of the left mainstem bronchus.  No flowsheet data found.      Assessment:       1. Lung mass    2. Centrilobular emphysema        Outpatient Encounter Medications as of 3/28/2019   Medication Sig Dispense Refill    albuterol (PROAIR HFA) 90 mcg/actuation inhaler Inhale 2 puffs into the lungs every 6 (six) hours as needed for Wheezing. Rescue 18 g 1    ALCOHOL PREP PADS PadM       aspirin 81 MG Chew Take 81 mg by mouth once daily.      blood sugar diagnostic Strp 1 strip by Misc.(Non-Drug; Combo Route) route once daily. 100 strip 11    blood-glucose meter kit Use as instructed. Please provide glucometer covered under insurance plan. 1 each 0    calcium carbonate 1250 MG capsule Take 1,250 mg by mouth.      cetirizine (ZYRTEC) 10 MG tablet TAKE 1 TABLET (10 MG TOTAL) BY MOUTH DAILY AS NEEDED FOR ALLERGIES OR RHINITIS.  5    cilostazol (PLETAL) 50 MG Tab TAKE 1 TABLET BY MOUTH EVERY DAY 90 tablet 0    fluticasone (FLONASE) 50 mcg/actuation nasal spray 2 sprays (100 mcg total) by Each Nare route once daily. 16 g 0    gabapentin (NEURONTIN) 300 MG capsule TAKE 1 CAPSULE (300 MG TOTAL) BY MOUTH EVERY EVENING. 90 capsule 0    glipiZIDE (GLUCOTROL) 5 MG tablet Take 1 tablet (5 mg total) by mouth 2 (two) times daily with meals. 180 tablet 3    lancets 30 gauge Misc       lancing device Misc 1 Device by Misc.(Non-Drug; Combo Route) route once daily. 1 each 0    levothyroxine (SYNTHROID) 112 MCG tablet Take 1 tablet (112 mcg total) by mouth once daily. 30 tablet 11    meloxicam (MOBIC) 15 MG tablet Take 1 tablet (15 mg total) by  mouth once daily. 90 tablet 1    metoprolol succinate (TOPROL-XL) 25 MG 24 hr tablet Take 1 tablet (25 mg total) by mouth once daily. 90 tablet 1    nitroGLYCERIN (NITROSTAT) 0.4 MG SL tablet Place 1 tablet (0.4 mg total) under the tongue every 5 (five) minutes as needed for Chest pain. 50 tablet 1    ramipril (ALTACE) 5 MG capsule TAKE 1 CAPSULE (5 MG TOTAL) BY MOUTH ONCE DAILY. 90 capsule 0    vit A/vit C/vit E/zinc/copper (ICAPS AREDS ORAL) Take by mouth.      colchicine 0.6 mg tablet Take 2 tablets (1.2 mg total) by mouth once daily. At onset of gout symptoms. May repeat tablet every 12 hours 30 tablet 0    umeclidinium-vilanterol (ANORO ELLIPTA) 62.5-25 mcg/actuation DsDv Inhale 1 puff into the lungs once daily. Controller 1 each 5     No facility-administered encounter medications on file as of 3/28/2019.      Orders Placed This Encounter   Procedures    Six Minute Walk Test to qualify for Home Oxygen     Standing Status:   Future     Number of Occurrences:   1     Standing Expiration Date:   3/28/2020    Case Request Operating Room: ENDOBRONCHIAL ULTRASOUND (EBUS)     Order Specific Question:   Medical Necessity:     Answer:   Medically Urgent [101]     Order Specific Question:   CPT Code:     Answer:   [place] BRONCH W/ EBUS, SAMPLING 3 OR MORE NODES, INCL GUIDE [36343]     Order Specific Question:   CPT Code:     Answer:   [place] BRONCHOSCOPY,TRANSBRON ASPIR BX [92074]     Order Specific Question:   Post-Procedure Disposition:     Answer:   Amb Surgery/DOSC [2]     Plan:     Problem List Items Addressed This Visit     Lung mass - Primary    Overview     Diagnostic bronchoscopy with EBUS scheduled.  I have explained the risks, benefits and alternatives of the procedure in detail.  The patient voices understanding and all questions have been answered.  The patient agrees to proceed as planned.         Relevant Orders    Six Minute Walk Test to qualify for Home Oxygen (Completed)    Case Request Operating Room:  ENDOBRONCHIAL ULTRASOUND (EBUS) (Completed)    Centrilobular emphysema    Relevant Medications    umeclidinium-vilanterol (ANORO ELLIPTA) 62.5-25 mcg/actuation DsDv    Other Relevant Orders    Six Minute Walk Test to qualify for Home Oxygen (Completed)

## 2019-03-29 NOTE — PROCEDURES
Khloe Kendrick is a 78 y.o.  female patient, who presents for a 6 minute walk test ordered by Belen Cid MD.  The diagnosis is Qualify for Oxygen; Lung Mass.  The patient's BMI is 26.5 kg/m2. Predicted distance (lower limit of normal) is 252.07 meters.    Test Results:    The test was completed without stopping.  The total time walked was 360 seconds.  During walking, the patient reported:  Dyspnea, Leg pain.  The patient used no assistive devices during testing.     03/28/2019---------Distance: 267.31 meters (877 feet)     O2 Sat % Supplemental Oxygen Heart Rate Blood Pressure Ramon Scale   Pre-exercise  (Resting) 95 % Room Air 79 bpm 150/70 mmHg 0   During Exercise 87 % Room Air 95 bpm 206/88 mmHg 3   Post-exercise   96 % Room Air  86 bpm 185/79 mmHg      Recovery Time:  246 seconds    Oxygen Qualification:     O2 Sat % Supplemental Oxygen Heart Rate Blood Pressure Ramon Scale   Pre-exercise  (Resting) 98 % 2 L/M  82 bpm  178/77 mmHg 0    During Exercise   99 %  2 L/M  82 bpm  164/73 mmHg 0    Post-exercise   98 %  2 L/M  83 bpm        Performing nurse/tech:  Delma ESPINOSA      PREVIOUS STUDY:   The patient has not had a previous study.      CLINICAL INTERPRETATION:  Six minute walk distance is 267.31 meters (877 feet) with moderate dyspnea.  During exercise, there was significant desaturation while breathing room air.  Blood pressure increased significantly and Heart rate remained stable with walking.  Hypertension was present prior to exercise.  The patient reported non-pulmonary symptoms during exercise.  Significant exercise impairment is likely due to desaturation, cardiovascular causes and subjective symptoms.  The patient did complete the study, walking 360 seconds of the 360 second test.  The patient may benefit from using supplemental oxygen during exertion.  No previous study performed.  Based upon age and body mass index, exercise capacity may be normal.   Oxygen saturation did improve while  breathing supplemental oxygen.

## 2019-04-01 ENCOUNTER — TELEPHONE (OUTPATIENT)
Dept: PULMONOLOGY | Facility: CLINIC | Age: 79
End: 2019-04-01

## 2019-04-01 ENCOUNTER — PATIENT MESSAGE (OUTPATIENT)
Dept: PULMONOLOGY | Facility: CLINIC | Age: 79
End: 2019-04-01

## 2019-04-01 ENCOUNTER — ANESTHESIA EVENT (OUTPATIENT)
Dept: SURGERY | Facility: HOSPITAL | Age: 79
End: 2019-04-01
Payer: MEDICARE

## 2019-04-01 DIAGNOSIS — J96.11 CHRONIC RESPIRATORY FAILURE WITH HYPOXIA: ICD-10-CM

## 2019-04-01 DIAGNOSIS — J43.9 PULMONARY EMPHYSEMA, UNSPECIFIED EMPHYSEMA TYPE: Primary | ICD-10-CM

## 2019-04-01 NOTE — TELEPHONE ENCOUNTER
When called to review instructions again with patient, daughter is unclear if mother wishes to pursue a diagnosis of this probable cancer as she would not pursue treatment.  Informed daughter that we will call back in 15 minutes to confirm if will go through with procedure.  If patient to not pursue a diagnosis, informed daughter that we recommend palliative care/home hospice.

## 2019-04-01 NOTE — TELEPHONE ENCOUNTER
----- Message from Justyna Castellano MA sent at 4/1/2019  2:26 PM CDT -----  Contact: pt's daughter Shakira Haywood   Dr Cid,  Can you please give Ms Kendrick's daughter a call. She has several things she would like to speak to you about.  Thank you, Justyna  ----- Message -----  From: Bree Robles  Sent: 4/1/2019   2:19 PM  To: Jose Roberto APONTE Staff    Shakira is calling in regards to questions and concerns about pt's procedure on tomorrow. She would like a call back as soon as possible.    She can be reached at [phone].    Thank you

## 2019-04-02 ENCOUNTER — ANESTHESIA (OUTPATIENT)
Dept: SURGERY | Facility: HOSPITAL | Age: 79
End: 2019-04-02
Payer: MEDICARE

## 2019-04-02 ENCOUNTER — HOSPITAL ENCOUNTER (OUTPATIENT)
Facility: HOSPITAL | Age: 79
Discharge: HOME | End: 2019-04-02
Attending: INTERNAL MEDICINE | Admitting: INTERNAL MEDICINE
Payer: MEDICARE

## 2019-04-02 VITALS
HEIGHT: 62 IN | OXYGEN SATURATION: 93 % | WEIGHT: 143 LBS | RESPIRATION RATE: 16 BRPM | TEMPERATURE: 99 F | HEART RATE: 84 BPM | BODY MASS INDEX: 26.31 KG/M2 | SYSTOLIC BLOOD PRESSURE: 144 MMHG | DIASTOLIC BLOOD PRESSURE: 67 MMHG

## 2019-04-02 DIAGNOSIS — R91.8 LUNG MASS: Primary | ICD-10-CM

## 2019-04-02 LAB
POCT GLUCOSE: 129 MG/DL (ref 70–110)
POCT GLUCOSE: 97 MG/DL (ref 70–110)

## 2019-04-02 PROCEDURE — 31652 BRONCH EBUS SAMPLNG 1/2 NODE: CPT | Mod: ,,, | Performed by: INTERNAL MEDICINE

## 2019-04-02 PROCEDURE — 27201423 OPTIME MED/SURG SUP & DEVICES STERILE SUPPLY: Mod: HCNC | Performed by: INTERNAL MEDICINE

## 2019-04-02 PROCEDURE — 88342 IMHCHEM/IMCYTCHM 1ST ANTB: CPT | Mod: 26,HCNC,, | Performed by: PATHOLOGY

## 2019-04-02 PROCEDURE — 25000003 PHARM REV CODE 250: Mod: HCNC | Performed by: NURSE ANESTHETIST, CERTIFIED REGISTERED

## 2019-04-02 PROCEDURE — 36000705 HC OR TIME LEV I EA ADD 15 MIN: Mod: HCNC | Performed by: INTERNAL MEDICINE

## 2019-04-02 PROCEDURE — 88172 CYTOLOGY SPECIMEN- PULMONARY MEDICAL CYTOLOGY: ICD-10-PCS | Mod: 26,HCNC,, | Performed by: PATHOLOGY

## 2019-04-02 PROCEDURE — 94761 N-INVAS EAR/PLS OXIMETRY MLT: CPT | Mod: HCNC

## 2019-04-02 PROCEDURE — 37000008 HC ANESTHESIA 1ST 15 MINUTES: Mod: HCNC | Performed by: INTERNAL MEDICINE

## 2019-04-02 PROCEDURE — 88342 CYTOLOGY SPECIMEN- PULMONARY MEDICAL CYTOLOGY: ICD-10-PCS | Mod: 26,HCNC,, | Performed by: PATHOLOGY

## 2019-04-02 PROCEDURE — 88172 CYTP DX EVAL FNA 1ST EA SITE: CPT | Mod: 26,HCNC,, | Performed by: PATHOLOGY

## 2019-04-02 PROCEDURE — 88342 IMHCHEM/IMCYTCHM 1ST ANTB: CPT | Mod: HCNC,59 | Performed by: PATHOLOGY

## 2019-04-02 PROCEDURE — 88305 TISSUE EXAM BY PATHOLOGIST: CPT | Mod: HCNC | Performed by: PATHOLOGY

## 2019-04-02 PROCEDURE — 31652 PR BRONCH W/ EBUS, SAMPLING 1 OR 2 NODES, INCL GUIDE: ICD-10-PCS | Mod: ,,, | Performed by: INTERNAL MEDICINE

## 2019-04-02 PROCEDURE — 27000221 HC OXYGEN, UP TO 24 HOURS: Mod: HCNC

## 2019-04-02 PROCEDURE — 88173 CYTOPATH EVAL FNA REPORT: CPT | Mod: 26,HCNC,, | Performed by: PATHOLOGY

## 2019-04-02 PROCEDURE — 25000003 PHARM REV CODE 250: Mod: HCNC | Performed by: INTERNAL MEDICINE

## 2019-04-02 PROCEDURE — 36000704 HC OR TIME LEV I 1ST 15 MIN: Mod: HCNC | Performed by: INTERNAL MEDICINE

## 2019-04-02 PROCEDURE — 82962 GLUCOSE BLOOD TEST: CPT | Mod: HCNC | Performed by: INTERNAL MEDICINE

## 2019-04-02 PROCEDURE — D9220A PRA ANESTHESIA: ICD-10-PCS | Mod: HCNC,,, | Performed by: ANESTHESIOLOGY

## 2019-04-02 PROCEDURE — 88305 TISSUE EXAM BY PATHOLOGIST: CPT | Mod: 26,HCNC,, | Performed by: PATHOLOGY

## 2019-04-02 PROCEDURE — 37000009 HC ANESTHESIA EA ADD 15 MINS: Mod: HCNC | Performed by: INTERNAL MEDICINE

## 2019-04-02 PROCEDURE — 88305 CYTOLOGY SPECIMEN- PULMONARY MEDICAL CYTOLOGY: ICD-10-PCS | Mod: 26,HCNC,, | Performed by: PATHOLOGY

## 2019-04-02 PROCEDURE — 88341 PR IHC OR ICC EACH ADD'L SINGLE ANTIBODY  STAINPR: ICD-10-PCS | Mod: 26,HCNC,, | Performed by: PATHOLOGY

## 2019-04-02 PROCEDURE — D9220A PRA ANESTHESIA: Mod: HCNC,,, | Performed by: ANESTHESIOLOGY

## 2019-04-02 PROCEDURE — 71000015 HC POSTOP RECOV 1ST HR: Mod: HCNC | Performed by: INTERNAL MEDICINE

## 2019-04-02 PROCEDURE — 88341 IMHCHEM/IMCYTCHM EA ADD ANTB: CPT | Mod: 26,HCNC,, | Performed by: PATHOLOGY

## 2019-04-02 PROCEDURE — 88173 CYTOLOGY SPECIMEN- PULMONARY MEDICAL CYTOLOGY: ICD-10-PCS | Mod: 26,HCNC,, | Performed by: PATHOLOGY

## 2019-04-02 PROCEDURE — 63600175 PHARM REV CODE 636 W HCPCS: Mod: HCNC | Performed by: NURSE ANESTHETIST, CERTIFIED REGISTERED

## 2019-04-02 PROCEDURE — 71000033 HC RECOVERY, INTIAL HOUR: Mod: HCNC | Performed by: INTERNAL MEDICINE

## 2019-04-02 PROCEDURE — 71000039 HC RECOVERY, EACH ADD'L HOUR: Mod: HCNC | Performed by: INTERNAL MEDICINE

## 2019-04-02 PROCEDURE — 25000003 PHARM REV CODE 250: Mod: HCNC | Performed by: ANESTHESIOLOGY

## 2019-04-02 RX ORDER — FENTANYL CITRATE 50 UG/ML
INJECTION, SOLUTION INTRAMUSCULAR; INTRAVENOUS
Status: DISCONTINUED | OUTPATIENT
Start: 2019-04-02 | End: 2019-04-02

## 2019-04-02 RX ORDER — SODIUM CHLORIDE 0.9 % (FLUSH) 0.9 %
10 SYRINGE (ML) INJECTION
Status: DISCONTINUED | OUTPATIENT
Start: 2019-04-02 | End: 2019-04-02 | Stop reason: HOSPADM

## 2019-04-02 RX ORDER — FENTANYL CITRATE 50 UG/ML
25 INJECTION, SOLUTION INTRAMUSCULAR; INTRAVENOUS EVERY 5 MIN PRN
Status: DISCONTINUED | OUTPATIENT
Start: 2019-04-02 | End: 2019-04-02 | Stop reason: HOSPADM

## 2019-04-02 RX ORDER — GLYCOPYRROLATE 0.2 MG/ML
INJECTION INTRAMUSCULAR; INTRAVENOUS
Status: DISCONTINUED | OUTPATIENT
Start: 2019-04-02 | End: 2019-04-02

## 2019-04-02 RX ORDER — PHENYLEPHRINE HYDROCHLORIDE 10 MG/ML
INJECTION INTRAVENOUS
Status: DISCONTINUED | OUTPATIENT
Start: 2019-04-02 | End: 2019-04-02

## 2019-04-02 RX ORDER — MIDAZOLAM HYDROCHLORIDE 1 MG/ML
INJECTION, SOLUTION INTRAMUSCULAR; INTRAVENOUS
Status: DISCONTINUED | OUTPATIENT
Start: 2019-04-02 | End: 2019-04-02

## 2019-04-02 RX ORDER — LIDOCAINE HYDROCHLORIDE 10 MG/ML
INJECTION, SOLUTION EPIDURAL; INFILTRATION; INTRACAUDAL; PERINEURAL
Status: DISCONTINUED | OUTPATIENT
Start: 2019-04-02 | End: 2019-04-02 | Stop reason: HOSPADM

## 2019-04-02 RX ORDER — SODIUM CHLORIDE 9 MG/ML
INJECTION, SOLUTION INTRAVENOUS CONTINUOUS
Status: DISCONTINUED | OUTPATIENT
Start: 2019-04-02 | End: 2019-04-02 | Stop reason: HOSPADM

## 2019-04-02 RX ORDER — ONDANSETRON 2 MG/ML
INJECTION INTRAMUSCULAR; INTRAVENOUS
Status: DISCONTINUED | OUTPATIENT
Start: 2019-04-02 | End: 2019-04-02

## 2019-04-02 RX ORDER — PROPOFOL 10 MG/ML
VIAL (ML) INTRAVENOUS CONTINUOUS PRN
Status: DISCONTINUED | OUTPATIENT
Start: 2019-04-02 | End: 2019-04-02

## 2019-04-02 RX ORDER — ONDANSETRON 2 MG/ML
4 INJECTION INTRAMUSCULAR; INTRAVENOUS DAILY PRN
Status: DISCONTINUED | OUTPATIENT
Start: 2019-04-02 | End: 2019-04-02 | Stop reason: HOSPADM

## 2019-04-02 RX ORDER — LIDOCAINE HCL/PF 100 MG/5ML
SYRINGE (ML) INTRAVENOUS
Status: DISCONTINUED | OUTPATIENT
Start: 2019-04-02 | End: 2019-04-02

## 2019-04-02 RX ORDER — PROPOFOL 10 MG/ML
VIAL (ML) INTRAVENOUS
Status: DISCONTINUED | OUTPATIENT
Start: 2019-04-02 | End: 2019-04-02

## 2019-04-02 RX ORDER — ETOMIDATE 2 MG/ML
INJECTION INTRAVENOUS
Status: DISCONTINUED | OUTPATIENT
Start: 2019-04-02 | End: 2019-04-02

## 2019-04-02 RX ADMIN — GLYCOPYRROLATE 0.2 MG: 0.2 INJECTION, SOLUTION INTRAMUSCULAR; INTRAVENOUS at 11:04

## 2019-04-02 RX ADMIN — SODIUM CHLORIDE 1 MCG/KG/MIN: 9 INJECTION, SOLUTION INTRAVENOUS at 11:04

## 2019-04-02 RX ADMIN — SODIUM CHLORIDE, SODIUM GLUCONATE, SODIUM ACETATE, POTASSIUM CHLORIDE, MAGNESIUM CHLORIDE, SODIUM PHOSPHATE, DIBASIC, AND POTASSIUM PHOSPHATE: .53; .5; .37; .037; .03; .012; .00082 INJECTION, SOLUTION INTRAVENOUS at 11:04

## 2019-04-02 RX ADMIN — SODIUM CHLORIDE: 0.9 INJECTION, SOLUTION INTRAVENOUS at 09:04

## 2019-04-02 RX ADMIN — PROPOFOL 150 MCG/KG/MIN: 10 INJECTION, EMULSION INTRAVENOUS at 11:04

## 2019-04-02 RX ADMIN — ONDANSETRON 4 MG: 2 INJECTION INTRAMUSCULAR; INTRAVENOUS at 11:04

## 2019-04-02 RX ADMIN — MIDAZOLAM HYDROCHLORIDE 0.5 MG: 1 INJECTION, SOLUTION INTRAMUSCULAR; INTRAVENOUS at 11:04

## 2019-04-02 RX ADMIN — ETOMIDATE 18 MG: 2 INJECTION, SOLUTION INTRAVENOUS at 11:04

## 2019-04-02 RX ADMIN — FENTANYL CITRATE 50 MCG: 50 INJECTION, SOLUTION INTRAMUSCULAR; INTRAVENOUS at 11:04

## 2019-04-02 RX ADMIN — PHENYLEPHRINE HYDROCHLORIDE 150 MCG: 10 INJECTION INTRAVENOUS at 11:04

## 2019-04-02 RX ADMIN — PHENYLEPHRINE HYDROCHLORIDE 100 MCG: 10 INJECTION INTRAVENOUS at 12:04

## 2019-04-02 RX ADMIN — LIDOCAINE HYDROCHLORIDE 100 MG: 20 INJECTION, SOLUTION INTRAVENOUS at 11:04

## 2019-04-02 RX ADMIN — PROPOFOL 90 MG: 10 INJECTION, EMULSION INTRAVENOUS at 11:04

## 2019-04-02 NOTE — PLAN OF CARE
Patient and patient's daughters received discharge instructions and prescriptions.  Patient and patient's daughters verbalized understanding of all instructions given and all questions were addressed prior to patient's discharge.  Patient's vital signs are stable and within patient's baseline.  Patient tolerated clear liquids PO.  Patient denies pain.  Patient denies nausea and vomiting at this time.  Patient meets all criteria for discharge at this time.  All required consents present in patient's chart upon patient's discharge.

## 2019-04-02 NOTE — INTERVAL H&P NOTE
The patient has been examined and the H&P has been reviewed:    I concur with the findings and no changes have occurred since H&P was written.    Anesthesia/Surgery risks, benefits and alternative options discussed and understood by patient/family.          Active Hospital Problems    Diagnosis  POA    Lung mass [R91.8]  Yes     Diagnostic bronchoscopy with EBUS scheduled.  I have explained the risks, benefits and alternatives of the procedure in detail.  The patient voices understanding and all questions have been answered.  The patient agrees to proceed as planned.        Resolved Hospital Problems   No resolved problems to display.

## 2019-04-02 NOTE — DISCHARGE INSTRUCTIONS
Endoscopic Diagnosis of Chest, Lung Problems  Youve been told you need an endoscopic procedure to diagnose a problem in your chest or lung. This procedure allows your healthcare provider to view the airway of your lungs and take a tissue sample (biopsy) or treat a lung condition, if needed.     With bronchoscopy, a flexible scope allows the healthcare provider to view and biopsy the airway.   Bronchoscopy  A bronchoscopy allows the healthcare provider to look directly into the airways in your lungs. This is done using a bronchoscope. A bronchoscope is a thin, flexible, hollow, lighted tube that lets the doctor see inside the lung. Tools can be passed down the middle of the scope.  Transbronchial biopsy  Transbronchial biopsy (TBB) is a procedure used mainly to take samples of tissue near the airway. This is done using a bronchoscope and tiny forceps. The forceps are passed through the scope into the airway, and a sample is taken.  Endobronchial ultrasound  Endobronchial ultrasound (EBUS) is a type of bronchoscopy. With EBUS, the lungs and the space between the lungs (mediastinum) are looked at using a flexible bronchoscope and ultrasound (images created using sound waves). Ultrasound guides the healthcare provider and allows him or her to see through the airway walls.  Preparing for the procedure  Before your procedure, do the following:  · Follow your healthcare providers instructions about eating and drinking.  · Tell your healthcare provider about the medicines you take. You may need to stop taking some of them before the procedure, especially aspirin, Coumadin, or other blood thinners.  · Talk with your healthcare provider about any allergies and health problems you have.  · Tell your healthcare provider if you are pregnant.  During the procedure  You will get medicine through an intravenous (IV) line to help you relax and sleep during the procedure. You may also receive local anesthesia (numbing medicine)  with a needle. Then a special spray is used to numb your throat and nose or mouth. This is to help keep you comfortable and prevent coughing during the procedure.  After the procedure  You are sent to the recovery room until the sedation wears off. This takes about 1 to 2 hours. Once you are fully awake, you can go home. You will need an adult family member or friend to drive you home. Your throat will be sore for a day or two. At first, there may be a small amount of blood in your sputum. This is normal. It should go away after the second day.  Risks and complications  · Bleeding  · Infection  · Injury to vocal cords  · Pneumothorax (collapsed lung)   When to call your healthcare provider  · Large amounts of blood in sputum  · Blood in sputum after 2 days  · Shortness of breath  · Chest pain  · Fever of 100.4ºF (38°C) or higher, or as directed by your healthcare provider  · Hoarseness that wont go away   Date Last Reviewed: 11/1/2016  © 2240-0981 [employer]. [address] [place] [address]. All rights reserved. This information is not intended as a substitute for professional medical care. Always follow your healthcare professional's instructions.        Discharge Instructions: After Your Surgery  Youve just had surgery. During surgery, you were given medicine called anesthesia to keep you relaxed and free of pain. After surgery, you may have some pain or nausea. This is common. Here are some tips for feeling better and getting well after surgery.     Stay on schedule with your medicine.   Going home  Your healthcare provider will show you how to take care of yourself when you go home. He or she will also answer your questions. Have an adult family member or friend drive you home. For the first 24 hours after your surgery:  · Do not drive or use heavy equipment.  · Do not make important decisions or sign legal papers.  · Do not drink alcohol.  · Have someone stay with you, if needed. He  or she can watch for problems and help keep you safe.  Be sure to go to all follow-up visits with your healthcare provider. And rest after your surgery for as long as your healthcare provider tells you to.  Coping with pain  If you have pain after surgery, pain medicine will help you feel better. Take it as told, before pain becomes severe. Also, ask your healthcare provider or pharmacist about other ways to control pain. This might be with heat, ice, or relaxation. And follow any other instructions your surgeon or nurse gives you.  Tips for taking pain medicine  To get the best relief possible, remember these points:  · Pain medicines can upset your stomach. Taking them with a little food may help.  · Most pain relievers taken by mouth need at least 20 to 30 minutes to start to work.  · Taking medicine on a schedule can help you remember to take it. Try to time your medicine so that you can take it before starting an activity. This might be before you get dressed, go for a walk, or sit down for dinner.  · Constipation is a common side effect of pain medicines. Call your healthcare provider before taking any medicines such as laxatives or stool softeners to help ease constipation. Also ask if you should skip any foods. Drinking lots of fluids and eating foods such as fruits and vegetables that are high in fiber can also help. Remember, do not take laxatives unless your surgeon has prescribed them.  · Drinking alcohol and taking pain medicine can cause dizziness and slow your breathing. It can even be deadly. Do not drink alcohol while taking pain medicine.  · Pain medicine can make you react more slowly to things. Do not drive or run machinery while taking pain medicine.  Your healthcare provider may tell you to take acetaminophen to help ease your pain. Ask him or her how much you are supposed to take each day. Acetaminophen or other pain relievers may interact with your prescription medicines or other  over-the-counter (OTC) medicines. Some prescription medicines have acetaminophen and other ingredients. Using both prescription and OTC acetaminophen for pain can cause you to overdose. Read the labels on your OTC medicines with care. This will help you to clearly know the list of ingredients, how much to take, and any warnings. It may also help you not take too much acetaminophen. If you have questions or do not understand the information, ask your pharmacist or healthcare provider to explain it to you before you take the OTC medicine.  Managing nausea  Some people have an upset stomach after surgery. This is often because of anesthesia, pain, or pain medicine, or the stress of surgery. These tips will help you handle nausea and eat healthy foods as you get better. If you were on a special food plan before surgery, ask your healthcare provider if you should follow it while you get better. These tips may help:  · Do not push yourself to eat. Your body will tell you when to eat and how much.  · Start off with clear liquids and soup. They are easier to digest.  · Next try semi-solid foods, such as mashed potatoes, applesauce, and gelatin, as you feel ready.  · Slowly move to solid foods. Dont eat fatty, rich, or spicy foods at first.  · Do not force yourself to have 3 large meals a day. Instead eat smaller amounts more often.  · Take pain medicines with a small amount of solid food, such as crackers or toast, to avoid nausea.     Call your surgeon if  · You still have pain an hour after taking medicine. The medicine may not be strong enough.  · You feel too sleepy, dizzy, or groggy. The medicine may be too strong.  · You have side effects like nausea, vomiting, or skin changes, such as rash, itching, or hives.       If you have obstructive sleep apnea  You were given anesthesia medicine during surgery to keep you comfortable and free of pain. After surgery, you may have more apnea spells because of this medicine and  other medicines you were given. The spells may last longer than usual.   At home:  · Keep using the continuous positive airway pressure (CPAP) device when you sleep. Unless your healthcare provider tells you not to, use it when you sleep, day or night. CPAP is a common device used to treat obstructive sleep apnea.  · Talk with your provider before taking any pain medicine, muscle relaxants, or sedatives. Your provider will tell you about the possible dangers of taking these medicines.  Date Last Reviewed: 12/1/2016 © 2000-2017 [employer]. [address] [place] [address]. All rights reserved. This information is not intended as a substitute for professional medical care. Always follow your healthcare professional's instructions.

## 2019-04-02 NOTE — DISCHARGE SUMMARY
[facility]  Pulmonology  Discharge Summary      Patient Name: Khloe Kendrick  MRN: [id number]  Admission Date: 4/2/2019  Hospital Length of Stay: 0 days  Discharge Date and Time:  04/02/2019 3:03 PM  Attending Physician: Belen Cid MD   Discharging Provider: Belen Cid MD  Primary Care Provider: Aimee Marquis MD    HPI: Patient is a current smoker with a history of lung mass detected with change in cough.    Procedure(s) (LRB):  ENDOBRONCHIAL ULTRASOUND (EBUS) (N/A)    Indwelling Lines/Drains at Time of Discharge:   Lines/Drains/Airways          None          Hospital Course: Bronchoscopy complete.  Left mainstem mass with large paratracheal lymph node.  Preliminary of EBUS with FNA of station 4R positive for malignancy.          Significant Labs:  All pertinent labs within the past 24 hours have been reviewed.    Significant Imaging:  I have reviewed all pertinent imaging results/findings within the past 24 hours.    Pending Diagnostic Studies:     Procedure Component Value Units Date/Time    Cytology Specimen- Pulmonary Medical Cytology [[id number]] Collected:  04/02/19 1217    Order Status:  Sent Lab Status:  In process Updated:  04/02/19 1244    Specimen:  Other specimen location (comment)         Final Active Diagnoses:    Diagnosis Date Noted POA    Lung mass [R91.8] 03/28/2019 Yes      Problems Resolved During this Admission:       Discharged Condition: stable    Disposition: Home or Self Care    Follow Up:  Call in one week.    Patient Instructions:      Diet general     Medications:  Reconciled Home Medications:      Medication List      ASK your doctor about these medications    albuterol 90 mcg/actuation inhaler  Commonly known as:  PROAIR HFA  Inhale 2 puffs into the lungs every 6 (six) hours as needed for Wheezing. Rescue     ALCOHOL PREP PADS Padm  Generic drug:  alcohol swabs     aspirin 81 MG Chew  Take 81 mg by mouth once daily.     blood sugar diagnostic Strp  1 strip by  Misc.(Non-Drug; Combo Route) route once daily.     blood-glucose meter kit  Use as instructed. Please provide glucometer covered under insurance plan.     calcium carbonate 1250 MG capsule  Take 1,250 mg by mouth.     cetirizine 10 MG tablet  Commonly known as:  ZYRTEC  TAKE 1 TABLET (10 MG TOTAL) BY MOUTH DAILY AS NEEDED FOR ALLERGIES OR RHINITIS.     cilostazol 50 MG Tab  Commonly known as:  PLETAL  TAKE 1 TABLET BY MOUTH EVERY DAY     colchicine 0.6 mg tablet  Commonly known as:  COLCRYS  Take 2 tablets (1.2 mg total) by mouth once daily. At onset of gout symptoms. May repeat tablet every 12 hours     fluticasone 50 mcg/actuation nasal spray  Commonly known as:  FLONASE  2 sprays (100 mcg total) by Each Nare route once daily.     gabapentin 300 MG capsule  Commonly known as:  NEURONTIN  TAKE 1 CAPSULE (300 MG TOTAL) BY MOUTH EVERY EVENING.     glipiZIDE 5 MG tablet  Commonly known as:  GLUCOTROL  Take 1 tablet (5 mg total) by mouth 2 (two) times daily with meals.     ICAPS AREDS ORAL  Take by mouth.     lancets 30 gauge Misc     lancing device Misc  1 Device by Misc.(Non-Drug; Combo Route) route once daily.     levothyroxine 112 MCG tablet  Commonly known as:  SYNTHROID  Take 1 tablet (112 mcg total) by mouth once daily.     meloxicam 15 MG tablet  Commonly known as:  MOBIC  Take 1 tablet (15 mg total) by mouth once daily.     metoprolol succinate 25 MG 24 hr tablet  Commonly known as:  TOPROL-XL  Take 1 tablet (25 mg total) by mouth once daily.     nitroGLYCERIN 0.4 MG SL tablet  Commonly known as:  NITROSTAT  Place 1 tablet (0.4 mg total) under the tongue every 5 (five) minutes as needed for Chest pain.     ramipril 5 MG capsule  Commonly known as:  ALTACE  TAKE 1 CAPSULE (5 MG TOTAL) BY MOUTH ONCE DAILY.     umeclidinium-vilanterol 62.5-25 mcg/actuation Dsdv  Commonly known as:  ANORO ELLIPTA  Inhale 1 puff into the lungs once daily. Controller            Belen Cid MD  Pulmonology  [facility]

## 2019-04-02 NOTE — PLAN OF CARE
Patient arrived from PACU with CLARKE Vann RN.  Patient stable.  Report received at this time.  Assumed care of patient at this time.

## 2019-04-02 NOTE — TRANSFER OF CARE
"Anesthesia Transfer of Care Note    Patient: Khloe Kendrick    Procedure(s) Performed: Procedure(s) (LRB):  ENDOBRONCHIAL ULTRASOUND (EBUS) (N/A)    Patient location: PACU    Anesthesia Type: general    Transport from OR: Transported from OR on 6-10 L/min O2 by face mask with adequate spontaneous ventilation    Post pain: adequate analgesia    Post assessment: tolerated procedure well and no apparent anesthetic complications    Post vital signs: stable    Level of consciousness: sedated and responds to stimulation    Nausea/Vomiting: no nausea/vomiting    Complications: none    Transfer of care protocol was followed      Last vitals:   Visit Vitals  /60   Pulse 88   Temp 36.3 °C (97.4 °F) (Axillary)   Resp 12   Ht 5' 2" (1.575 m)   Wt 64.9 kg (143 lb)   SpO2 98%   Breastfeeding? No   BMI 26.16 kg/m²     "

## 2019-04-02 NOTE — ANESTHESIA PREPROCEDURE EVALUATION
04/02/2019  Khloe Kendrick is a 78 y.o., female.    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, without long-term current use of insulin     Coronary artery disease     Diabetes mellitus, type 2     Hyperlipidemia     Hypertension     Hyperthyroidism 8/20/2015    Hypothyroidism     Osteoarthritis     Peripheral arterial disease 4/3/2013    Tobacco dependence     Type 2 diabetes mellitus     Type 2 diabetes with peripheral circulatory disorder, controlled        Past Surgical History:   Procedure Laterality Date    ABDOMINAL SURGERY      AORTOGRAM WITH RUNOFF RCFA approach N/A 12/8/2014    Performed by FELICITAS Serrano III[facility] at [facility] CATH LAB    APPENDECTOMY      ARTHROSCOPY-KNEE DEBRIDEMENT Right 4/21/2017    Performed by [facility] Collins [facility] at [facility] OR    CARDIAC SURGERY  2008    CABG    CATARACT EXTRACTION Right     CATARACT EXTRACTION W/  INTRAOCULAR LENS IMPLANT  8/13/2015    Dr. Blount ( OS)    EYE SURGERY  2011    Rt eye cataract    HERNIA REPAIR      HYSTERECTOMY      HYSTEROTOMY N/A 33 yrs old    INCISION AND DRAINAGE-KNEE arthroscopic Right 4/21/2017    [facility]formed by [facility] at [facility] OR    INSERTION-INTRAOCULAR LENS (IOL) Left 8/13/2015    Performed by Bryan Blount MD at [facility] OR    PHACOEMULSIFICATION-ASPIRATION-CATARACT Left 8/13/2015    Performed by Bryan Blount MD at [facility] OR    SYNOVECTOMY-KNEE  4/21/2017    Performed by [facility] Collins [facility] at [facility] OR         Anesthesia Evaluation    I have reviewed the Patient Summary Reports.     I  have reviewed the Medications.     Review of Systems  Anesthesia Hx:  No problems with previous Anesthesia  History of prior surgery of interest to airway management or planning: Denies Family Hx of Anesthesia complications.   Denies Personal Hx of Anesthesia complications.   Cardiovascular:   Exercise tolerance: poor Hypertension CAD  CABG/stent (CABG 10 years ago, asymptomatic since)   Denies Angina. GIL ECG has been reviewed. Poor exercise tolerance recently due to cough/SOB   Pulmonary:   COPD (to start home O2 soon), severe    Hepatic/GI:  Hepatic/GI Normal    Neurological:  Neurology Normal    Endocrine:   Diabetes, type 2        Physical Exam  General:  Well nourished    Airway/Jaw/Neck:  Airway Findings: Mouth Opening: Normal Tongue: Normal  General Airway Assessment: Adult  Mallampati: III  TM Distance: Normal, at least 6 cm      Dental:  Dental Findings: Edentulous   Chest/Lungs:  Chest/Lungs Findings: Normal Respiratory Rate     Heart/Vascular:  Heart Findings: Rate: Normal        Mental Status:  Mental Status Findings:  Alert and Oriented         Anesthesia Plan  Type of Anesthesia, risks & benefits discussed:  Anesthesia Type:  general  Patient's Preference:   Intra-op Monitoring Plan: standard ASA monitors  Intra-op Monitoring Plan Comments:   Post Op Pain Control Plan: multimodal analgesia, IV/PO Opioids PRN and per primary service following discharge from PACU  Post Op Pain Control Plan Comments:   Induction:   IV  Beta Blocker:  Patient is not currently on a Beta-Blocker (No further documentation required).       Informed Consent: Patient understands risks and agrees with Anesthesia plan.  Questions answered. Anesthesia consent signed with patient.  ASA Score: 3     Day of Surgery Review of History & Physical:    H&P update referred to the surgeon.         Ready For Surgery From Anesthesia Perspective.

## 2019-04-03 NOTE — ANESTHESIA POSTPROCEDURE EVALUATION
Anesthesia Post Evaluation    Patient: Khloe Drew Kendrick    Procedure(s) Performed: Procedure(s) (LRB):  ENDOBRONCHIAL ULTRASOUND (EBUS) (N/A)    Final Anesthesia Type: general  Patient location during evaluation: PACU  Patient participation: Yes- Able to Participate  Level of consciousness: awake and alert  Post-procedure vital signs: reviewed and stable  Pain management: adequate  Airway patency: patent  PONV status at discharge: No PONV  Anesthetic complications: no      Cardiovascular status: blood pressure returned to baseline  Respiratory status: unassisted, room air and spontaneous ventilation  Hydration status: euvolemic  Follow-up not needed.          Vitals Value Taken Time   /67 4/2/2019  3:02 PM   Temp 37 °C (98.6 °F) 4/2/2019  3:00 PM   Pulse 84 4/2/2019  3:04 PM   Resp 16 4/2/2019  3:00 PM   SpO2 92 % 4/2/2019  3:04 PM   Vitals shown include unvalidated device data.      Event Time     Out of Recovery 14:24:00          Pain/Manish Score: Manish Score: 10 (4/2/2019  3:00 PM)

## 2019-04-05 ENCOUNTER — PATIENT MESSAGE (OUTPATIENT)
Dept: PULMONOLOGY | Facility: CLINIC | Age: 79
End: 2019-04-05

## 2019-04-05 DIAGNOSIS — C34.90 MALIGNANT NEOPLASM OF LUNG, UNSPECIFIED LATERALITY, UNSPECIFIED PART OF LUNG: Primary | ICD-10-CM

## 2019-04-08 ENCOUNTER — TELEPHONE (OUTPATIENT)
Dept: PULMONOLOGY | Facility: CLINIC | Age: 79
End: 2019-04-08

## 2019-04-08 NOTE — TELEPHONE ENCOUNTER
Lung confirmed to be small cell lung cancer.  Referral to [facility] hospice signed per patient request.  Daughter aware.

## 2019-04-09 ENCOUNTER — PATIENT MESSAGE (OUTPATIENT)
Dept: PULMONOLOGY | Facility: CLINIC | Age: 79
End: 2019-04-09

## 2019-05-02 RX ORDER — RAMIPRIL 5 MG/1
CAPSULE ORAL
Qty: 90 CAPSULE | Refills: 0 | Status: SHIPPED | OUTPATIENT
Start: 2019-05-02 | End: 2019-05-03 | Stop reason: SDUPTHER

## 2019-05-03 RX ORDER — RAMIPRIL 5 MG/1
CAPSULE ORAL
Qty: 90 CAPSULE | Refills: 1 | Status: SHIPPED | OUTPATIENT
Start: 2019-05-03

## 2019-06-16 DIAGNOSIS — I73.9 PERIPHERAL ARTERIAL DISEASE: Chronic | ICD-10-CM

## 2019-06-16 DIAGNOSIS — I70.0 ATHEROSCLEROSIS OF AORTA: Chronic | ICD-10-CM

## 2019-06-17 RX ORDER — CILOSTAZOL 50 MG/1
TABLET ORAL
Qty: 90 TABLET | Refills: 0 | Status: SHIPPED | OUTPATIENT
Start: 2019-06-17

## 2019-06-28 DIAGNOSIS — E11.42 CONTROLLED TYPE 2 DIABETES MELLITUS WITH DIABETIC POLYNEUROPATHY, WITHOUT LONG-TERM CURRENT USE OF INSULIN: ICD-10-CM

## 2019-06-28 RX ORDER — GABAPENTIN 300 MG/1
300 CAPSULE ORAL NIGHTLY
Qty: 90 CAPSULE | Refills: 0 | OUTPATIENT
Start: 2019-06-28

## 2019-07-01 DIAGNOSIS — E11.42 CONTROLLED TYPE 2 DIABETES MELLITUS WITH DIABETIC POLYNEUROPATHY, WITHOUT LONG-TERM CURRENT USE OF INSULIN: ICD-10-CM

## 2019-07-01 RX ORDER — GABAPENTIN 300 MG/1
300 CAPSULE ORAL NIGHTLY
Qty: 90 CAPSULE | Refills: 0 | OUTPATIENT
Start: 2019-07-01

## 2019-07-02 DIAGNOSIS — M25.50 ARTHRALGIA OF MULTIPLE JOINTS: ICD-10-CM

## 2019-07-02 DIAGNOSIS — I10 ESSENTIAL HYPERTENSION: Chronic | ICD-10-CM

## 2019-07-02 RX ORDER — METOPROLOL SUCCINATE 25 MG/1
TABLET, EXTENDED RELEASE ORAL
Qty: 90 TABLET | Refills: 1 | OUTPATIENT
Start: 2019-07-02

## 2019-07-02 RX ORDER — MELOXICAM 15 MG/1
TABLET ORAL
Qty: 90 TABLET | Refills: 1 | OUTPATIENT
Start: 2019-07-02

## 2019-07-22 ENCOUNTER — TELEPHONE (OUTPATIENT)
Dept: SMOKING CESSATION | Facility: CLINIC | Age: 79
End: 2019-07-22

## 2019-07-22 NOTE — TELEPHONE ENCOUNTER
Encounter opened to complete smart form and resolved episode for smoking cessation. Patient is , no further assessment is needed.

## 2019-09-21 NOTE — TELEPHONE ENCOUNTER
----- Message from Balbir Holman sent at 1/25/2018 11:38 AM CST -----  Contact: [facility]  [facility] pharmacy called stating pt would like to have blood sugar diagnostic Strp sent to their pharmacy. [facility] to [facility]  [address] [place] [address]   [phone].    Thanks-   No family members came for family meeting today.

## 2019-09-28 NOTE — ADDENDUM NOTE
ADMISSION DATE:  04/13/2017    DISCHARGE DATE:  04/16/2017    ADMITTING DIAGNOSIS:  Right knee degenerative joint disease.    DISCHARGE DIAGNOSES:  1. Right knee degenerative joint disease.  2. Total knee replacement right side.  3. Anxiety.  4. Bipolar disorder.  5. Colonic polyps.  6. Depression.  7. Hypertension.  8. Hypothyroidism.  9. Restless legs syndrome.    HISTORY OF PRESENT ILLNESS:  The patient was a 48-year-old white female, who was  admitted to the hospital for surgery by Dr. Lu.  Initial history and physical  was performed by Dr. Abdul.  The patient was admitted for right total knee  replacement.    HOSPITAL COURSE:  The patient was admitted to the hospital, did undergo surgical  intervention.  Postoperative care was per Dr. Lu.  Home meds were continued.  The patient was discharged to home on 04/16/2017.    ADVISE ON DISCHARGE:  Follow up with me in 1 week.    MEDICATIONS:  Per med rec.        DATE AND TIME                       Tomas Agrawal M.D.      SPD/MEDQ-#[id number]  DD:  05/07/2017 22:34:16      DT:  05/08/2017 19:11:39    cc:  Tomas Agrawal M.D.        [facility]    DISCHARGE SUMMARY            ANTHONY SOLIZ  MRN#: [id number]  ACCT#: [id number] Summary         Electronically Signed On 05/08/2017 22:50  __________________________________________________   TOMAS FONTANA     Addended by: LUIS ANGEL ALLISON on: 5/12/2017 09:17 AM     Modules accepted: Orders

## 2020-05-07 NOTE — PROGRESS NOTES
Anselmo Keith is a 61year old female.   Patient presents with:  Ear Problem: possible right ear infection, clear drainage from right ear and it feels plugged for about 7-10 days       HISTORY OF PRESENT ILLNESS     I have previously seen her for routine ea Subjective:       Patient ID: Khloe Kendrick is a 78 y.o. female.    Chief Complaint: URI (cough,congestionsinus drip X 6 weeks )    URI    This is a new problem. Episode onset: about 6 weeks ago. The problem has been gradually worsening. There has been no fever. Associated symptoms include coughing. Pertinent negatives include no congestion, ear pain, rhinorrhea, sinus pain, sneezing or sore throat. Treatments tried: cough syrup.       Past Medical History:   Diagnosis Date    AMD (age-related macular degeneration), bilateral 6/10/2016    Angina pectoris     Cataract     Coronary artery disease     Diabetes mellitus, type 2     Hyperlipidemia     Hypertension     Hyperthyroidism 8/20/2015    Hypothyroidism     Osteoarthritis     Peripheral arterial disease 4/3/2013    Tobacco dependence     Type 2 diabetes mellitus     Type 2 diabetes with peripheral circulatory disorder, controlled        Social History     Socioeconomic History    Marital status:      Spouse name: Not on file    Number of children: Not on file    Years of education: Not on file    Highest education level: Not on file   Social Needs    Financial resource strain: Not on file    Food insecurity - worry: Not on file    Food insecurity - inability: Not on file    Transportation needs - medical: Not on file    Transportation needs - non-medical: Not on file   Occupational History    Not on file   Tobacco Use    Smoking status: Current Every Day Smoker     Packs/day: 1.00    Smokeless tobacco: Current User   Substance and Sexual Activity    Alcohol use: No    Drug use: No    Sexual activity: Not on file   Other Topics Concern    Not on file   Social History Narrative    Not on file       Past Surgical History:   Procedure Laterality Date    ABDOMINAL SURGERY      AORTOGRAM WITH RUNOFF RCFA approach N/A 12/8/2014    Per[facility] by [facility] CATH LAB    APPENDECTOMY      ARTHROSCOPY-KNEE  "DEBRIDEMENT Right 4/21/2017    [facility] OR    CARDIAC SURGERY  2008    CABG    CATARACT EXTRACTION Right     CATARACT EXTRACTION W/  INTRAOCULAR LENS IMPLANT  8/13/2015    Dr. Blount ( OS)    EYE SURGERY  2011    Rt eye cataract    HERNIA REPAIR      HYSTERECTOMY      HYSTEROTOMY N/A 33 yrs old    INCISION AND DRAINAGE-KNEE arthroscopic Right 4/21/2017    [facility]formed by [facility] at [facility] OR    INSERTION-INTRAOCULAR LENS (IOL) Left 8/13/2015    Performed by Bryan Blount MD at [facility] OR    PHACOEMULSIFICATION-ASPIRATION-CATARACT Left 8/13/2015    Performed by Bryan Blount MD at [facility] OR    SYNOVECTOMY-KNEE  4/21/2017    Performed by [facility] Collins [facility] at [facility] OR       Review of Systems   Constitutional: Negative for chills and fever.   HENT: Positive for postnasal drip. Negative for congestion, ear pain, rhinorrhea, sinus pressure, sinus pain, sneezing and sore throat.    Respiratory: Positive for cough and shortness of breath.    All other systems reviewed and are negative.      Objective:   /64 (BP Location: Right arm, Patient Position: Sitting, BP Method: Medium (Manual))   Pulse 100   Temp 98.1 °F (36.7 °C) (Oral)   Ht 5' 1" (1.549 m)   Wt 66.2 kg (145 lb 13.4 oz)   SpO2 95%   BMI 27.56 kg/m²      Physical Exam    Assessment:       1. Need for influenza vaccination        Plan:       Khloe was seen today for uri.    Diagnoses and all orders for this visit:    Need for influenza vaccination    Other orders  -     Influenza - High Dose (65+) (PF) (IM)  -     Diabetic Eye Screening Photo; Future      " ear.     11/21/19 last visit she was started on ofloxacin drops and states that she feels there is still plugged and still not hearing as well.  Some discomfort as well on that same side.  Previously noted to have an old T-tube in place that she is had for No real change in hearing. No other signs, symptoms or complaints. She has used TobraDex eardrops in the past on the left with good resolution of her symptoms. No complaints or concerns on the left at this time.       Social History    Socioeconomic Hist rash.   Hema/Lymph Negative Easy bleeding and easy bruising.            PHYSICAL EXAM    /79 (BP Location: Right arm, Patient Position: Sitting, Cuff Size: adult)   Temp 98.3 °F (36.8 °C) (Tympanic)   Ht 5' 7\" (1.702 m)   Wt 165 lb (74.8 kg)   BMI 25 tobramycin-dexamethasone 0.3-0.1 % Ophthalmic Suspension, INSTILL 3 DROPS IN AFFECTED EAR(S) EVERY 8 HOURS, Disp: 5 mL, Rfl: 1  •  AMLODIPINE BESYLATE 5 MG Oral Tab, TAKE 1 TABLET(5 MG) BY MOUTH DAILY, Disp: 90 tablet, Rfl: 0  •  Metoprolol Succinate ER (T

## 2025-02-10 NOTE — PT/OT/SLP PROGRESS
"Physical Therapy      Khloe Drew Kendrick  MRN: [id number]    Patient not seen today secondary to pt is Off-the-unit for a procedure. "Wash Out" for knee    Nurse Notified.     Will follow-up at later time/day.    Cresencio Baron, PT   04/21/2017      " [No Acute Distress] : no acute distress [Normal Appearance] : normal appearance [Normal Rate/Rhythm] : normal rate/rhythm [Normal S1, S2] : normal s1, s2 [Clear to Auscultation Bilaterally] : clear to auscultation bilaterally [Benign] : benign [No HSM] : no hsm [Normal Gait] : normal gait [No Clubbing] : no clubbing [No Cyanosis] : no cyanosis [No Edema] : no edema [Normal Turgor] : normal turgor [No Focal Deficits] : no focal deficits [No Motor Deficits] : no motor deficits [Oriented x3] : oriented x3 [Normal Affect] : normal affect

## (undated) DEVICE — SEE MEDLINE ITEM 152530

## (undated) DEVICE — NDL ASPIRATING VIZISHOT 20-40M

## (undated) DEVICE — SEE MEDLINE ITEM 157117

## (undated) DEVICE — CHLORAPREP W TINT 26ML APPL

## (undated) DEVICE — CATH BRONCHOSCOPE F/BF

## (undated) DEVICE — GOWN XX-LARGE

## (undated) DEVICE — SUT ETHILON 4-0 BLK MONO

## (undated) DEVICE — COLLECTOR SPECIMEN ANAEROBIC

## (undated) DEVICE — GLOVE SURGICAL LATEX SZ 6.5

## (undated) DEVICE — SEE MEDLINE ITEM 146345

## (undated) DEVICE — PENCIL GOLF STERILE

## (undated) DEVICE — BLANKET UPPER BODY 78.7X29.9IN

## (undated) DEVICE — SEE MEDLINE ITEM 157131

## (undated) DEVICE — TUBE SET INFLOW/OUTFLOW

## (undated) DEVICE — ALCOHOL BLEND 95%

## (undated) DEVICE — SWAB CULTURETTE II DUAL

## (undated) DEVICE — GLOVE SURGICAL LATEX SZ 8

## (undated) DEVICE — CONTAINER SPECIMEN STRL 4OZ

## (undated) DEVICE — ABLATOR EXTENDED LENGTH

## (undated) DEVICE — SYS LABEL CORRECT MED

## (undated) DEVICE — SOL IRR NACL .9% 3000ML

## (undated) DEVICE — GLOVE BIOGEL ORTHOPEDIC 8

## (undated) DEVICE — SEE MEDLINE ITEM 152487

## (undated) DEVICE — PACK ARTHROSCOPY W/ISO BAC

## (undated) DEVICE — BLADE SURG CARBON STEEL SZ11

## (undated) DEVICE — ELECTRODE REM PLYHSV RETURN 9

## (undated) DEVICE — GAUZE SPONGE 4X4 12PLY

## (undated) DEVICE — SEE MEDLINE ITEM 146313

## (undated) DEVICE — SYR SLIP TIP 20CC

## (undated) DEVICE — LUBRICANT SURGILUBE 2 OZ

## (undated) DEVICE — Device

## (undated) DEVICE — UNDERGLOVE BIOGEL PI SZ 6.5 LF

## (undated) DEVICE — PACK SET UP CONVERTORS

## (undated) DEVICE — CYTOSPIN COLLECTION FLUID BLT

## (undated) DEVICE — ADAPTER SWIVEL

## (undated) DEVICE — CUFF TOURNIQUET DL PRT

## (undated) DEVICE — SYR 10CC LUER LOCK

## (undated) DEVICE — BLADE SHAVER 4.2 6/BX

## (undated) DEVICE — PADDING CAST SOFT-ROLL 6 X 4